# Patient Record
Sex: FEMALE | Race: WHITE | NOT HISPANIC OR LATINO | Employment: PART TIME | URBAN - METROPOLITAN AREA
[De-identification: names, ages, dates, MRNs, and addresses within clinical notes are randomized per-mention and may not be internally consistent; named-entity substitution may affect disease eponyms.]

---

## 2023-02-01 ENCOUNTER — TELEPHONE (OUTPATIENT)
Dept: OBSTETRICS AND GYNECOLOGY | Facility: CLINIC | Age: 68
End: 2023-02-01
Payer: MEDICARE

## 2023-02-03 ENCOUNTER — TELEPHONE (OUTPATIENT)
Dept: OBSTETRICS AND GYNECOLOGY | Facility: CLINIC | Age: 68
End: 2023-02-03
Payer: MEDICARE

## 2023-02-24 ENCOUNTER — TELEPHONE (OUTPATIENT)
Dept: ADMINISTRATIVE | Facility: OTHER | Age: 68
End: 2023-02-24
Payer: MEDICARE

## 2023-02-24 ENCOUNTER — OFFICE VISIT (OUTPATIENT)
Dept: OBSTETRICS AND GYNECOLOGY | Facility: CLINIC | Age: 68
End: 2023-02-24
Payer: MEDICARE

## 2023-02-24 VITALS — BODY MASS INDEX: 26.54 KG/M2 | DIASTOLIC BLOOD PRESSURE: 68 MMHG | WEIGHT: 159.5 LBS | SYSTOLIC BLOOD PRESSURE: 142 MMHG

## 2023-02-24 DIAGNOSIS — Z01.419 WELL WOMAN EXAM WITH ROUTINE GYNECOLOGICAL EXAM: ICD-10-CM

## 2023-02-24 DIAGNOSIS — N95.8 OTHER SPECIFIED MENOPAUSAL AND PERIMENOPAUSAL DISORDERS: ICD-10-CM

## 2023-02-24 DIAGNOSIS — Z12.4 ENCOUNTER FOR SCREENING FOR MALIGNANT NEOPLASM OF CERVIX: ICD-10-CM

## 2023-02-24 DIAGNOSIS — N95.1 MENOPAUSE SYNDROME: Primary | ICD-10-CM

## 2023-02-24 PROCEDURE — 99999 PR PBB SHADOW E&M-EST. PATIENT-LVL III: ICD-10-PCS | Mod: PBBFAC,,, | Performed by: STUDENT IN AN ORGANIZED HEALTH CARE EDUCATION/TRAINING PROGRAM

## 2023-02-24 PROCEDURE — 99213 OFFICE O/P EST LOW 20 MIN: CPT | Mod: PBBFAC,PO,25 | Performed by: STUDENT IN AN ORGANIZED HEALTH CARE EDUCATION/TRAINING PROGRAM

## 2023-02-24 PROCEDURE — 99999 PR PBB SHADOW E&M-EST. PATIENT-LVL III: CPT | Mod: PBBFAC,,, | Performed by: STUDENT IN AN ORGANIZED HEALTH CARE EDUCATION/TRAINING PROGRAM

## 2023-02-24 PROCEDURE — 88175 CYTOPATH C/V AUTO FLUID REDO: CPT | Performed by: STUDENT IN AN ORGANIZED HEALTH CARE EDUCATION/TRAINING PROGRAM

## 2023-02-24 PROCEDURE — 87624 HPV HI-RISK TYP POOLED RSLT: CPT | Performed by: STUDENT IN AN ORGANIZED HEALTH CARE EDUCATION/TRAINING PROGRAM

## 2023-02-24 PROCEDURE — G0101 CA SCREEN;PELVIC/BREAST EXAM: HCPCS | Mod: S$PBB,GZ,, | Performed by: STUDENT IN AN ORGANIZED HEALTH CARE EDUCATION/TRAINING PROGRAM

## 2023-02-24 PROCEDURE — G0101 PR CA SCREEN;PELVIC/BREAST EXAM: ICD-10-PCS | Mod: S$PBB,GZ,, | Performed by: STUDENT IN AN ORGANIZED HEALTH CARE EDUCATION/TRAINING PROGRAM

## 2023-02-24 RX ORDER — PHENYLEPHRINE HCL 10 MG
500 TABLET ORAL
COMMUNITY
End: 2023-10-30

## 2023-02-24 RX ORDER — GLUCOSAMINE HCL 500 MG
150 TABLET ORAL
COMMUNITY

## 2023-02-24 RX ORDER — GABAPENTIN 300 MG/1
600 CAPSULE ORAL NIGHTLY
COMMUNITY
Start: 2023-01-20

## 2023-02-24 RX ORDER — ESTRADIOL 0.1 MG/G
1 CREAM VAGINAL DAILY
Qty: 42.5 G | Refills: 3 | Status: SHIPPED | OUTPATIENT
Start: 2023-02-24

## 2023-02-24 RX ORDER — CHOLECALCIFEROL (VITAMIN D3) 50 MCG
2000 TABLET ORAL
COMMUNITY
End: 2023-10-30

## 2023-02-24 RX ORDER — ASCORBIC ACID 500 MG
500 TABLET ORAL
COMMUNITY
End: 2023-10-30

## 2023-02-24 RX ORDER — MELATONIN 10 MG
10 CAPSULE ORAL
COMMUNITY

## 2023-02-24 RX ORDER — PRENATAL VIT 75/IRON/FOLIC/OM3 28-800-440
COMBINATION PACKAGE (EA) ORAL
COMMUNITY

## 2023-02-24 RX ORDER — NAPROXEN SODIUM 220 MG
220 TABLET ORAL DAILY PRN
COMMUNITY

## 2023-02-24 NOTE — PROGRESS NOTES
CC: Well woman exam    HPI:  Terra Mcpherson is a 67 y.o. female  presents for a well woman exam.  She has no issues, problems, or complaints.      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      Past Surgical History:   Procedure Laterality Date    DILATION AND CURETTAGE OF UTERUS      HYSTERECTOMY       OB History    Para Term  AB Living   2 2 2     2   SAB IAB Ectopic Multiple Live Births           2      # Outcome Date GA Lbr Kehinde/2nd Weight Sex Delivery Anes PTL Lv   2 Term      Vag-Spont   NEETA   1 Term      Vag-Spont   NEETA       GYN  Menopausal: Yes  History of abnormal paps:  yes - reports hyst due to cervical dysplasia  Abnormal or postmenopausal bleeding: DENIES  History of abnormal mammograms:DENIES   Family history of breast or ovarian cancer: DENIES  Any breast masses, pain, skin changes, or nipple discharge: DENIES  Possible recent STD exposure: denies  Contraception: N/A    Pap: No result found, Done today  Mammogram:  scheduled      Family History   Problem Relation Age of Onset    Colon cancer Other      Social History     Tobacco Use    Smoking status: Never    Smokeless tobacco: Never   Substance Use Topics    Alcohol use: Not Currently    Drug use: Not Currently     Types: Other-see comments     Comment: 5CBD     Allergies: Statins-hmg-coa reductase inhibitors, Adhesive, Fenofibrate, Metformin, Omeprazole, Pamabrom, and Sulfa (sulfonamide antibiotics)    Current Outpatient Medications:     ascorbic acid, vitamin C, (VITAMIN C) 500 MG tablet, Take 500 mg by mouth., Disp: , Rfl:     cholecalciferol, vitamin D3, (VITAMIN D3) 50 mcg (2,000 unit) Tab, Take 2,000 Units by mouth., Disp: , Rfl:     cinnamon bark 500 mg capsule, Take 500 mg by mouth., Disp: , Rfl:     estradioL (ESTRACE) 0.01 % (0.1 mg/gram) vaginal cream, Place 1 g vaginally once daily., Disp: 42.5 g, Rfl: 3    gabapentin (NEURONTIN) 300 MG  capsule, Take 300 mg by mouth 3 (three) times daily., Disp: , Rfl:     insulin detemir U-100 (LEVEMIR FLEXTOUCH) 100 unit/mL (3 mL) SubQ InPn pen, Inject 100 mLs into the skin., Disp: , Rfl:     melatonin 10 mg Cap, Take 10 mg by mouth., Disp: , Rfl:     naproxen sodium (ANAPROX) 220 MG tablet, Take 220 mg by mouth., Disp: , Rfl:     resveratroL 250 mg Cap, Take by mouth., Disp: , Rfl:     ubidecarenone (COENZYME Q10) 100 mg Tab, Take 150 mg by mouth., Disp: , Rfl:     ZINC ORAL, Take 10 mg by mouth., Disp: , Rfl:        ROS:  GENERAL: Denies weight gain or weight loss. Feeling well overall.   SKIN: Denies rash or lesions.   HEAD: Denies head injury or headache.   NODES: Denies enlarged lymph nodes.   CHEST: Denies chest pain or shortness of breath.   CARDIOVASCULAR: Denies palpitations or left sided chest pain.   ABDOMEN: No abdominal pain, constipation, diarrhea, nausea, vomiting or rectal bleeding.   URINARY: No frequency, dysuria, hematuria, or burning on urination.  REPRODUCTIVE: See HPI.   BREASTS: The patient performs breast self-examination and denies pain, lumps, or nipple discharge.   HEMATOLOGIC: No easy bruisability or excessive bleeding.  MUSCULOSKELETAL: Denies joint pain or swelling.   NEUROLOGIC: Denies syncope or weakness.   PSYCHIATRIC: Denies depression, anxiety or mood swings.    Objective:   BP (!) 142/68   Wt 72.3 kg (159 lb 8 oz)   BMI 26.54 kg/m²       Physical Exam:  APPEARANCE: Well nourished, well developed, in no acute distress.  AFFECT: WNL, alert and oriented x 3  SKIN: No acne or hirsutism  NECK: Neck symmetric without masses or thyromegaly  NODES: No inguinal, cervical, axillary, or femoral lymph node enlargement  CHEST: Good respiratory effect  ABDOMEN: Soft.  No tenderness or masses.  No hepatosplenomegaly.  No hernias.  BREASTS: Symmetrical, no skin changes or visible lesions.  No palpable masses, nipple discharge bilaterally.  PELVIC: Normal external genitalia without lesions.   Normal hair distribution.  Adequate perineal body, normal urethral meatus.  Vagina atrophic without lesions or discharge.  Cervix surgically absent.  No significant cystocele or rectocele.  Bimanual exam shows uterus surgically absent, nontender.  Adnexa without masses or tenderness.  EXTREMITIES: No edema.    ASSESSMENT AND PLAN  1. Menopause syndrome  estradioL (ESTRACE) 0.01 % (0.1 mg/gram) vaginal cream      2. Encounter for screening for malignant neoplasm of cervix  Ambulatory referral/consult to Obstetrics / Gynecology    Liquid-Based Pap Smear, Screening    HPV High Risk Genotypes, PCR          Annual exam  Breast and pelvic exam: wnl  Patient counseled on ASCCP guidelines for cervical cytology screening  Cervical screening: discussed vaginal screening for 20 years once normal after hyst for dysplasia, likely beyond that time and will be able to discontinue. Patient reports she was still getting paps until she moved here, will complete today.  Patient counseled on current recommendations for breast cancer screening  Mammogram screening: ordered/scheduled already   STD testing: not requested today   Osteoporosis screening ordered  Tobacco cessation counseling n/a    She was counseled to follow up with her PCP for other routine health maintenance      Follow up in about 1 year (around 2/24/2024).      Jesika Solomon MD  OBGYN [facility]

## 2023-03-01 LAB
FINAL PATHOLOGIC DIAGNOSIS: NORMAL
HPV HR 12 DNA SPEC QL NAA+PROBE: NEGATIVE
HPV16 AG SPEC QL: NEGATIVE
HPV18 DNA SPEC QL NAA+PROBE: NEGATIVE
Lab: NORMAL

## 2023-03-02 ENCOUNTER — TELEPHONE (OUTPATIENT)
Dept: OBSTETRICS AND GYNECOLOGY | Facility: CLINIC | Age: 68
End: 2023-03-02
Payer: MEDICARE

## 2023-03-09 ENCOUNTER — OFFICE VISIT (OUTPATIENT)
Dept: URGENT CARE | Facility: CLINIC | Age: 68
End: 2023-03-09
Payer: MEDICARE

## 2023-03-09 VITALS
WEIGHT: 159 LBS | HEART RATE: 66 BPM | OXYGEN SATURATION: 96 % | SYSTOLIC BLOOD PRESSURE: 135 MMHG | RESPIRATION RATE: 16 BRPM | BODY MASS INDEX: 26.49 KG/M2 | HEIGHT: 65 IN | TEMPERATURE: 98 F | DIASTOLIC BLOOD PRESSURE: 69 MMHG

## 2023-03-09 DIAGNOSIS — R73.9 HYPERGLYCEMIA: ICD-10-CM

## 2023-03-09 DIAGNOSIS — Z20.822 ENCOUNTER FOR LABORATORY TESTING FOR COVID-19 VIRUS: ICD-10-CM

## 2023-03-09 DIAGNOSIS — R30.0 DYSURIA: ICD-10-CM

## 2023-03-09 DIAGNOSIS — R81 GLUCOSE FOUND IN URINE ON EXAMINATION: ICD-10-CM

## 2023-03-09 DIAGNOSIS — E86.0 DEHYDRATION: Primary | ICD-10-CM

## 2023-03-09 DIAGNOSIS — N39.0 URINARY TRACT INFECTION WITHOUT HEMATURIA, SITE UNSPECIFIED: ICD-10-CM

## 2023-03-09 LAB
BILIRUB UR QL STRIP: NEGATIVE
CTP QC/QA: YES
GLUCOSE SERPL-MCNC: 253 MG/DL (ref 70–110)
GLUCOSE SERPL-MCNC: 335 MG/DL (ref 70–110)
GLUCOSE UR QL STRIP: POSITIVE
KETONES UR QL STRIP: NEGATIVE
LEUKOCYTE ESTERASE UR QL STRIP: NEGATIVE
PH, POC UA: 5
POC BLOOD, URINE: NEGATIVE
POC NITRATES, URINE: NEGATIVE
PROT UR QL STRIP: NEGATIVE
SARS-COV-2 AG RESP QL IA.RAPID: NEGATIVE
SP GR UR STRIP: 1.01 (ref 1–1.03)
UROBILINOGEN UR STRIP-ACNC: ABNORMAL (ref 0.1–1.1)

## 2023-03-09 PROCEDURE — 87086 URINE CULTURE/COLONY COUNT: CPT

## 2023-03-09 PROCEDURE — 81003 POCT URINALYSIS, DIPSTICK, AUTOMATED, W/O SCOPE: ICD-10-PCS | Mod: QW,S$GLB,,

## 2023-03-09 PROCEDURE — 99204 OFFICE O/P NEW MOD 45 MIN: CPT | Mod: S$GLB,,,

## 2023-03-09 PROCEDURE — 81003 URINALYSIS AUTO W/O SCOPE: CPT | Mod: QW,S$GLB,,

## 2023-03-09 PROCEDURE — 82962 GLUCOSE BLOOD TEST: CPT | Mod: S$GLB,,,

## 2023-03-09 PROCEDURE — 82962 POCT GLUCOSE, HAND-HELD DEVICE: ICD-10-PCS | Mod: S$GLB,,,

## 2023-03-09 PROCEDURE — 87811 SARS CORONAVIRUS 2 ANTIGEN POCT, MANUAL READ: ICD-10-PCS | Mod: QW,CR,S$GLB,

## 2023-03-09 PROCEDURE — 87811 SARS-COV-2 COVID19 W/OPTIC: CPT | Mod: QW,CR,S$GLB,

## 2023-03-09 PROCEDURE — 99204 PR OFFICE/OUTPT VISIT, NEW, LEVL IV, 45-59 MIN: ICD-10-PCS | Mod: S$GLB,,,

## 2023-03-09 RX ORDER — SODIUM CHLORIDE 9 MG/ML
INJECTION, SOLUTION INTRAVENOUS
Status: COMPLETED | OUTPATIENT
Start: 2023-03-09 | End: 2023-03-09

## 2023-03-09 RX ORDER — CYCLOSPORINE 0.5 MG/ML
1 EMULSION OPHTHALMIC 2 TIMES DAILY
COMMUNITY
Start: 2023-02-02

## 2023-03-09 RX ORDER — CEPHALEXIN 500 MG/1
500 CAPSULE ORAL EVERY 12 HOURS
Qty: 14 CAPSULE | Refills: 0 | Status: SHIPPED | OUTPATIENT
Start: 2023-03-09 | End: 2023-03-16

## 2023-03-09 RX ORDER — ONDANSETRON 4 MG/1
4 TABLET, ORALLY DISINTEGRATING ORAL EVERY 8 HOURS PRN
Qty: 9 TABLET | Refills: 0 | Status: SHIPPED | OUTPATIENT
Start: 2023-03-09 | End: 2023-03-12

## 2023-03-09 RX ADMIN — SODIUM CHLORIDE: 9 INJECTION, SOLUTION INTRAVENOUS at 09:03

## 2023-03-09 NOTE — PATIENT INSTRUCTIONS
UTI   If your condition worsens or fails to improve we recommend that you receive another evaluation at the ER immediately or contact your PCP to discuss your concerns or return here. You must understand that you've received an urgent care treatment only and that you may be released before all your medical problems are known or treated. You the patient will arrange for followup care as instructed.   If you were prescribed antibiotics, please take them to full completion.    If you had cultures done it will take 3-5 days to result. We will call you with the result.   If you are are female and on BCP use additional methods to prevent pregnancy while on the antibiotics and for one cycle after.   Cranberry juice may help. Get the 100% cranberry juice and mix 4 oz of juice with 4 oz of water and drink this 8 oz glass of liquid once a day.      Should your symptoms get worse or develop any new symptoms please go to emergency room.  Please hydrate Zofran has been sent to the pharmacy for you to help you with any nausea.  Please monitor your blood glucose.  Please do not drive while dizzy.

## 2023-03-09 NOTE — PROGRESS NOTES
"Subjective:       Patient ID: Terra Mcpherson is a 67 y.o. female.    Vitals:  height is 5' 5" (1.651 m) and weight is 72.1 kg (159 lb). Her tympanic temperature is 97.8 °F (36.6 °C). Her blood pressure is 135/69 and her pulse is 66. Her respiration is 16 and oxygen saturation is 96%.     Chief Complaint: Dysuria    Patient stated her symptoms started 1 day ago.  She stated when she gets dehydrated she gets nauseous and feels like she will faint.  No exposure to covid or flu.     Dysuria   This is a new problem. The current episode started yesterday. The problem has been unchanged. The quality of the pain is described as burning. The pain is at a severity of 5/10. The pain is moderate. There has been no fever. She is Not sexually active. Associated symptoms include nausea and constipation. Pertinent negatives include no discharge, hematuria or vomiting. Associated symptoms comments: Headache, back and abdominal pain. She has tried nothing for the symptoms. The treatment provided no relief. Her past medical history is significant for hypertension. There is no history of kidney stones.     Cardiovascular: Negative.  Negative for chest pain and passing out.   Respiratory: Negative.  Negative for chest tightness and shortness of breath.    Gastrointestinal:  Positive for nausea and constipation. Negative for vomiting.   Genitourinary:  Positive for dysuria. Negative for hematuria.     Objective:      Physical Exam   Constitutional: She is oriented to person, place, and time. She appears well-developed. She is cooperative.  Non-toxic appearance. She does not appear ill. No distress.   HENT:   Head: Normocephalic and atraumatic.   Ears:   Right Ear: Hearing normal.   Left Ear: Hearing normal.   Nose: Nose normal. No mucosal edema, rhinorrhea, nasal deformity or congestion. No epistaxis. Right sinus exhibits no maxillary sinus tenderness and no frontal sinus tenderness. Left sinus exhibits no maxillary sinus tenderness " and no frontal sinus tenderness.   Mouth/Throat: Uvula is midline, oropharynx is clear and moist and mucous membranes are normal. Mucous membranes are moist. No trismus in the jaw. Normal dentition. No uvula swelling. No oropharyngeal exudate, posterior oropharyngeal edema, posterior oropharyngeal erythema, tonsillar abscesses or cobblestoning. Tonsils are 1+ on the right. Tonsils are 1+ on the left. No tonsillar exudate.   Eyes: Conjunctivae and lids are normal. No scleral icterus.   Neck: Trachea normal and phonation normal. Neck supple. No edema present. No erythema present. No neck rigidity present.   Cardiovascular: Normal rate, regular rhythm, S1 normal, S2 normal, normal heart sounds and normal pulses.   Pulmonary/Chest: Effort normal and breath sounds normal. No accessory muscle usage or stridor. No apnea, no tachypnea and no bradypnea. No respiratory distress. She has no decreased breath sounds. She has no wheezes. She has no rhonchi. She has no rales.   Abdominal: Normal appearance and bowel sounds are normal. Soft. There is no abdominal tenderness. There is no rebound, no guarding, no tenderness at McBurney's point, negative Sarah's sign, no left CVA tenderness, negative Rovsing's sign, negative psoas sign, no right CVA tenderness and negative obturator sign.   Musculoskeletal: Normal range of motion.         General: No deformity. Normal range of motion.   Neurological: She is alert and oriented to person, place, and time. She exhibits normal muscle tone. Coordination normal.   Skin: Skin is warm, dry, intact, not diaphoretic and not pale.   Psychiatric: Her speech is normal and behavior is normal. Judgment and thought content normal.   Nursing note and vitals reviewed.      Results for orders placed or performed in visit on 03/09/23   POCT Urinalysis, Dipstick, Automated, W/O Scope   Result Value Ref Range    POC Blood, Urine Negative Negative    POC Bilirubin, Urine Negative Negative    POC  Urobilinogen, Urine norm 0.1 - 1.1    POC Ketones, Urine Negative Negative    POC Protein, Urine Negative Negative    POC Nitrates, Urine Negative Negative    POC Glucose, Urine Positive (A) Negative    pH, UA 5.0     POC Specific Gravity, Urine 1.010 1.003 - 1.029    POC Leukocytes, Urine Negative Negative   SARS Coronavirus 2 Antigen, POCT Manual Read   Result Value Ref Range    SARS Coronavirus 2 Antigen Negative Negative     Acceptable Yes    POCT Glucose, Hand-Held Device   Result Value Ref Range    POC Glucose 335 (A) 70 - 110 MG/DL   POCT Glucose, Hand-Held Device   Result Value Ref Range    POC Glucose 253 (A) 70 - 110 MG/DL       Assessment:       1. Dehydration    2. Dysuria    3. Encounter for laboratory testing for COVID-19 virus    4. Glucose found in urine on examination    5. Urinary tract infection without hematuria, site unspecified    6. Hyperglycemia          Plan:         Dehydration  -     Orthostatic vital signs    Dysuria  -     POCT Urinalysis, Dipstick, Automated, W/O Scope  -     CULTURE, URINE    Encounter for laboratory testing for COVID-19 virus  -     SARS Coronavirus 2 Antigen, POCT Manual Read    Glucose found in urine on examination  -     POCT Glucose, Hand-Held Device  -     POCT Glucose, Hand-Held Device    Urinary tract infection without hematuria, site unspecified  -     cephALEXin (KEFLEX) 500 MG capsule; Take 1 capsule (500 mg total) by mouth every 12 (twelve) hours. for 7 days  Dispense: 14 capsule; Refill: 0    Hyperglycemia    Other orders  -     0.9%  NaCl infusion  -     ondansetron (ZOFRAN-ODT) 4 MG TbDL; Take 1 tablet (4 mg total) by mouth every 8 (eight) hours as needed (nausea).  Dispense: 9 tablet; Refill: 0  -     0.9%  NaCl infusion           Medical Decision Making:   Initial Assessment:   PT in room AAOX4, skin W/D, resp E/U, non toxic in appearance, NAD.  All lung fields clear to auscultation No Trismus, or regine's, PT tolerating secretions, able  to visualize uvula.   No drooling PT speaking in clear sentences. Non surgical ABD no CVA tenderness  Urgent Care Management:  Discussed with patient patient's history urinary tract infections patient reports this feels like previous UTIs in past.  Discussed with patient I will send patient home with Keflex to treat urinary tract infection, discussed urine results with patient that there was no leukocytes or nitrites found but since patient has a history of UTIs since feels like similar to previous episodes and  with our UA machine there can be a miss, that I will treat patient with Keflex for urinary tract infection.  Discussed with patient that I will also give patient 500 mL of normal saline to help with patient's hyperglycemia.  Patient reports sugar at home was 103 but between home and coming to clinic patient ate a meal that was high in sugar.  Discussed with patient that I will send patient home with Zofran and to orally hydrate after patient received 750 mL normal saline and to monitor blood sugar and should sugar get too high go to emergency room.  Patient's glucose improved 253 after 750 mL.  Discussed with patient that I believe patient's dizziness is due to dehydration as patient has been dehydrated in past.  I do not have a recent creatinine or BUN to go off of so I will only give 500 mL normal saline to treat sugar as well as dehydration.  Discussed with patient that oral hydration is key to getting patient to improve with dizziness.  Discussed with patient should dizziness get any worse that patient should go to emergency room.  Patient has reported had a very similar episode in the past patient was given fluids and felt much better.  Patient reports taking in very little water throughout the day and drinking coffee and patient has a history of frequent dehydration.  Strict ED precautions given to patient.  Discussed with patient follow-up primary care provider if this does not improve in the next 48  hours.  Discussed with patient if patient stills feels dizzy to please do not drive thoroughly discussed this with patient, discussed different options such as calling cab or over with patient.  Patient reports feeling a little bit better after fluid and reports is able to drive, patient is awake alert oriented in walking with steady gait in clinic, again reviewed with patient if patient is dizzy to not drive this was thoroughly discussed throughout visit.  Patient agrees with treatment plan and verbalizes understanding patient ambulatory from clinic in no acute distress

## 2023-03-10 LAB — BACTERIA UR CULT: NORMAL

## 2023-03-11 ENCOUNTER — TELEPHONE (OUTPATIENT)
Dept: URGENT CARE | Facility: CLINIC | Age: 68
End: 2023-03-11
Payer: MEDICARE

## 2023-03-14 ENCOUNTER — TELEPHONE (OUTPATIENT)
Dept: ADMINISTRATIVE | Facility: OTHER | Age: 68
End: 2023-03-14
Payer: MEDICARE

## 2023-03-17 ENCOUNTER — LAB VISIT (OUTPATIENT)
Dept: LAB | Facility: HOSPITAL | Age: 68
End: 2023-03-17
Attending: STUDENT IN AN ORGANIZED HEALTH CARE EDUCATION/TRAINING PROGRAM
Payer: MEDICARE

## 2023-03-17 ENCOUNTER — TELEPHONE (OUTPATIENT)
Dept: ENDOCRINOLOGY | Facility: CLINIC | Age: 68
End: 2023-03-17

## 2023-03-17 ENCOUNTER — OFFICE VISIT (OUTPATIENT)
Dept: ENDOCRINOLOGY | Facility: CLINIC | Age: 68
End: 2023-03-17
Payer: MEDICARE

## 2023-03-17 VITALS
HEART RATE: 68 BPM | RESPIRATION RATE: 16 BRPM | DIASTOLIC BLOOD PRESSURE: 78 MMHG | WEIGHT: 158.75 LBS | HEIGHT: 65 IN | SYSTOLIC BLOOD PRESSURE: 119 MMHG | BODY MASS INDEX: 26.45 KG/M2

## 2023-03-17 DIAGNOSIS — I87.2 VENOUS INSUFFICIENCY: ICD-10-CM

## 2023-03-17 DIAGNOSIS — E11.42 POLYNEUROPATHY DUE TO TYPE 2 DIABETES MELLITUS: ICD-10-CM

## 2023-03-17 DIAGNOSIS — E78.2 MIXED HYPERLIPIDEMIA: ICD-10-CM

## 2023-03-17 DIAGNOSIS — Z79.4 TYPE 2 DIABETES MELLITUS WITH HYPERGLYCEMIA, WITH LONG-TERM CURRENT USE OF INSULIN: Primary | ICD-10-CM

## 2023-03-17 DIAGNOSIS — E11.65 TYPE 2 DIABETES MELLITUS WITH HYPERGLYCEMIA, WITH LONG-TERM CURRENT USE OF INSULIN: Primary | ICD-10-CM

## 2023-03-17 LAB
ALBUMIN SERPL BCP-MCNC: 4.1 G/DL (ref 3.5–5.2)
ALBUMIN/CREAT UR: 5.8 UG/MG (ref 0–30)
ALP SERPL-CCNC: 88 U/L (ref 55–135)
ALT SERPL W/O P-5'-P-CCNC: 22 U/L (ref 10–44)
ANION GAP SERPL CALC-SCNC: 10 MMOL/L (ref 8–16)
AST SERPL-CCNC: 18 U/L (ref 10–40)
BILIRUB SERPL-MCNC: 0.5 MG/DL (ref 0.1–1)
BUN SERPL-MCNC: 16 MG/DL (ref 8–23)
C PEPTIDE SERPL-MCNC: 1.79 NG/ML (ref 0.78–5.19)
CALCIUM SERPL-MCNC: 9.8 MG/DL (ref 8.7–10.5)
CHLORIDE SERPL-SCNC: 102 MMOL/L (ref 95–110)
CHOLEST SERPL-MCNC: 311 MG/DL (ref 120–199)
CHOLEST/HDLC SERPL: 8.2 {RATIO} (ref 2–5)
CO2 SERPL-SCNC: 26 MMOL/L (ref 23–29)
CREAT SERPL-MCNC: 0.8 MG/DL (ref 0.5–1.4)
CREAT UR-MCNC: 51.3 MG/DL (ref 15–325)
EST. GFR  (NO RACE VARIABLE): >60 ML/MIN/1.73 M^2
ESTIMATED AVG GLUCOSE: 217 MG/DL (ref 68–131)
GLUCOSE SERPL-MCNC: 185 MG/DL (ref 70–110)
HBA1C MFR BLD: 9.2 % (ref 4–5.6)
HDLC SERPL-MCNC: 38 MG/DL (ref 40–75)
HDLC SERPL: 12.2 % (ref 20–50)
LDLC SERPL CALC-MCNC: 197.8 MG/DL (ref 63–159)
LIPASE SERPL-CCNC: 43 U/L (ref 4–60)
MICROALBUMIN UR DL<=1MG/L-MCNC: 3 UG/ML
NONHDLC SERPL-MCNC: 273 MG/DL
POTASSIUM SERPL-SCNC: 4.3 MMOL/L (ref 3.5–5.1)
PROT SERPL-MCNC: 7.3 G/DL (ref 6–8.4)
SODIUM SERPL-SCNC: 138 MMOL/L (ref 136–145)
TRIGL SERPL-MCNC: 376 MG/DL (ref 30–150)
TSH SERPL DL<=0.005 MIU/L-ACNC: 1.69 UIU/ML (ref 0.4–4)

## 2023-03-17 PROCEDURE — 82570 ASSAY OF URINE CREATININE: CPT | Performed by: STUDENT IN AN ORGANIZED HEALTH CARE EDUCATION/TRAINING PROGRAM

## 2023-03-17 PROCEDURE — 84681 ASSAY OF C-PEPTIDE: CPT | Performed by: STUDENT IN AN ORGANIZED HEALTH CARE EDUCATION/TRAINING PROGRAM

## 2023-03-17 PROCEDURE — 36415 COLL VENOUS BLD VENIPUNCTURE: CPT | Performed by: STUDENT IN AN ORGANIZED HEALTH CARE EDUCATION/TRAINING PROGRAM

## 2023-03-17 PROCEDURE — 80053 COMPREHEN METABOLIC PANEL: CPT | Performed by: STUDENT IN AN ORGANIZED HEALTH CARE EDUCATION/TRAINING PROGRAM

## 2023-03-17 PROCEDURE — 99999 PR STA SHADOW: CPT | Mod: PBBFAC,,, | Performed by: STUDENT IN AN ORGANIZED HEALTH CARE EDUCATION/TRAINING PROGRAM

## 2023-03-17 PROCEDURE — 99999 PR PBB SHADOW E&M-EST. PATIENT-LVL IV: CPT | Mod: PBBFAC,,, | Performed by: STUDENT IN AN ORGANIZED HEALTH CARE EDUCATION/TRAINING PROGRAM

## 2023-03-17 PROCEDURE — 99204 OFFICE O/P NEW MOD 45 MIN: CPT | Mod: S$PBB | Performed by: STUDENT IN AN ORGANIZED HEALTH CARE EDUCATION/TRAINING PROGRAM

## 2023-03-17 PROCEDURE — 84443 ASSAY THYROID STIM HORMONE: CPT | Performed by: STUDENT IN AN ORGANIZED HEALTH CARE EDUCATION/TRAINING PROGRAM

## 2023-03-17 PROCEDURE — 99214 OFFICE O/P EST MOD 30 MIN: CPT | Mod: PBBFAC | Performed by: STUDENT IN AN ORGANIZED HEALTH CARE EDUCATION/TRAINING PROGRAM

## 2023-03-17 PROCEDURE — 80061 LIPID PANEL: CPT | Performed by: STUDENT IN AN ORGANIZED HEALTH CARE EDUCATION/TRAINING PROGRAM

## 2023-03-17 PROCEDURE — 83036 HEMOGLOBIN GLYCOSYLATED A1C: CPT | Performed by: STUDENT IN AN ORGANIZED HEALTH CARE EDUCATION/TRAINING PROGRAM

## 2023-03-17 PROCEDURE — 99999 PR STA SHADOW: ICD-10-PCS | Mod: PBBFAC,,, | Performed by: STUDENT IN AN ORGANIZED HEALTH CARE EDUCATION/TRAINING PROGRAM

## 2023-03-17 PROCEDURE — 83690 ASSAY OF LIPASE: CPT | Performed by: STUDENT IN AN ORGANIZED HEALTH CARE EDUCATION/TRAINING PROGRAM

## 2023-03-17 NOTE — ASSESSMENT & PLAN NOTE
Ok to continue gabapentin which she has mostly used for vasomotor symptoms of menopause  Recommended OTC  mg daily

## 2023-03-17 NOTE — TELEPHONE ENCOUNTER
Augustin order form faxed  to [facility] on 3/17/2022. Confirmation received.        ----- Message from Chris Moss MD sent at 3/17/2023  1:57 PM CDT -----  Augustin orders to [facility]    Thanks

## 2023-03-17 NOTE — ASSESSMENT & PLAN NOTE
Uncontrolled based on A1c and reported POCT glucose with significant glycemic variability.    Patient is on an intensive insulin regimen with 4 daily injections and is testing glucose 4+ times daily. Patient has demonstrated an understanding of technology and is motivated to use the device correctly. Patient is expected to adhere to a diabetes treatment plan and is capable of recognizing alerts and alarms. Patient has recurrent, severe (BG <54) hypoglycemia and impaired awareness of hypoglycemia.    Patient's insulin regimen requires frequent adjustments based on BG results. Patient will receive an in-person visit every 6 months to assess adherence to CGM regimen and diabetes treatment.    I am unsure of control. She has most fasting POCT glucose at goal but glucose on chemistry was 273. Suspect prandial rises. Will check A1c.    Pending A1c, consider adding GLP-1 RA. Her history of pancreatitis is not clear to me so I think we can use. Will check lipase, C-peptide.    Plan  - Check labs  - Continue Levemir 35 U daily  - Start FreeStyle Augustin 2 CGM  - ALA for neuropathy

## 2023-03-17 NOTE — PROGRESS NOTES
Subjective:      Patient ID: Terra Mcpherson is a 67 y.o. female.    Chief Complaint:  Type 2 diabetes mellitus    History of Present Illness  This is a 67 y.o. female. with a past medical history of Type 2 diabetes mellitus here for evaluation.    Type 2 diabetes mellitus    Current diabetes medications:  - Levemir 35 U daily  - Novolog SSI 4 times a day    INSULIN CORRECTION SCALE    Glucose                 Insulin           181-200               +2 units                     201-250               +4 units                      251-300               +6 units                     301-350               +8 units                      >350                    +10 units                       Past diabetes medications:  - Metformin - intolerance  - Farxiga - intolerance  - Trulicity - reports history of pancreatitis - but upon clarifying she has never been really diagnosed with lipase or CT      Lab Results   Component Value Date    CREATININE 0.8 03/17/2023    EGFRNORACEVR >60 03/17/2023       Known diabetic complications: peripheral neuropathy    Weight trend:  Wt Readings from Last 8 Encounters:   03/17/23 72 kg (158 lb 11.7 oz)   03/13/23 71.8 kg (158 lb 4.6 oz)   03/09/23 72.1 kg (159 lb)   02/24/23 72.3 kg (159 lb 8 oz)   10/09/22 74.9 kg (165 lb 2 oz)       Family history of diabetes:  Yes, mother and everyone in her side of the family  Siblings +  Children have been checked, negative for diabetes    Prior visit with diabetes education: yes    Current diet: 3 meals per day  Current exercise: On her feet all day    Blood glucose monitoring at home: 4x/day  Home blood sugar records:   Any episodes of hypoglycemia? Yes    Diabetes Management Status  Statin: Not taking  ACE/ARB: Not taking    Screening or Prevention Patient's value   HgA1C Testing and Control   No results found for: HGBA1C     LDL control Lab Results   Component Value Date    LDLCALC 197.8 (H) 03/17/2023      Nephropathy screening Lab Results  "  Component Value Date    MICALBCREAT 5.8 03/17/2023        Last eye exam: Most Recent Eye Exam Date: Not Found      Review of Systems  As above    Social and family history reviewed  Current medications and allergies reviewed    Objective:   /78 (BP Location: Left arm, Patient Position: Sitting, BP Method: Medium (Manual))   Pulse 68   Resp 16   Ht 5' 5" (1.651 m)   Wt 72 kg (158 lb 11.7 oz)   BMI 26.41 kg/m²   Physical Exam  Alert, oriented    BP Readings from Last 1 Encounters:   03/17/23 119/78      Wt Readings from Last 1 Encounters:   03/17/23 1053 72 kg (158 lb 11.7 oz)     Body mass index is 26.41 kg/m².    Lab Review:   No results found for: HGBA1C  Lab Results   Component Value Date    CHOL 311 (H) 03/17/2023    HDL 38 (L) 03/17/2023    LDLCALC 197.8 (H) 03/17/2023    TRIG 376 (H) 03/17/2023    CHOLHDL 12.2 (L) 03/17/2023     Lab Results   Component Value Date     03/17/2023    K 4.3 03/17/2023     03/17/2023    CO2 26 03/17/2023     (H) 03/17/2023    BUN 16 03/17/2023    CREATININE 0.8 03/17/2023    CALCIUM 9.8 03/17/2023    PROT 7.3 03/17/2023    ALBUMIN 4.1 03/17/2023    BILITOT 0.5 03/17/2023    ALKPHOS 88 03/17/2023    AST 18 03/17/2023    ALT 22 03/17/2023    ANIONGAP 10 03/17/2023    TSH 1.685 03/17/2023       All pertinent labs reviewed    Assessment and Plan     Type 2 diabetes mellitus with hyperglycemia, with long-term current use of insulin  Uncontrolled based on A1c and reported POCT glucose with significant glycemic variability.    Patient is on an intensive insulin regimen with 4 daily injections and is testing glucose 4+ times daily. Patient has demonstrated an understanding of technology and is motivated to use the device correctly. Patient is expected to adhere to a diabetes treatment plan and is capable of recognizing alerts and alarms. Patient has recurrent, severe (BG <54) hypoglycemia and impaired awareness of hypoglycemia.    Patient's insulin regimen " requires frequent adjustments based on BG results. Patient will receive an in-person visit every 6 months to assess adherence to CGM regimen and diabetes treatment.    I am unsure of control. She has most fasting POCT glucose at goal but glucose on chemistry was 273. Suspect prandial rises. Will check A1c.    Pending A1c, consider adding GLP-1 RA. Her history of pancreatitis is not clear to me so I think we can use. Will check lipase, C-peptide.    Plan  - Check labs  - Continue Levemir 35 U daily  - Start FreeStyle Augustin 2 CGM  - ALA for neuropathy    Polyneuropathy due to type 2 diabetes mellitus  Ok to continue gabapentin which she has mostly used for vasomotor symptoms of menopause  Recommended OTC  mg daily    Venous insufficiency  She has tried leg elevation, compression stocking and they have failed  Vascular Surgery referral    Mixed hyperlipidemia  Allergy to statins  Will check lipids - consider Zetia      Follow-up in 6 months    Chris Moss MD  Endocrinology

## 2023-03-20 DIAGNOSIS — E78.2 MIXED HYPERLIPIDEMIA: Primary | ICD-10-CM

## 2023-03-20 DIAGNOSIS — Z78.9 STATIN INTOLERANCE: ICD-10-CM

## 2023-10-02 ENCOUNTER — OFFICE VISIT (OUTPATIENT)
Dept: ENDOCRINOLOGY | Facility: CLINIC | Age: 68
End: 2023-10-02
Payer: MEDICARE

## 2023-10-02 VITALS
SYSTOLIC BLOOD PRESSURE: 134 MMHG | WEIGHT: 158 LBS | HEIGHT: 65 IN | DIASTOLIC BLOOD PRESSURE: 74 MMHG | HEART RATE: 76 BPM | BODY MASS INDEX: 26.33 KG/M2

## 2023-10-02 DIAGNOSIS — R10.13 EPIGASTRIC PAIN: ICD-10-CM

## 2023-10-02 DIAGNOSIS — E11.65 TYPE 2 DIABETES MELLITUS WITH HYPERGLYCEMIA, WITH LONG-TERM CURRENT USE OF INSULIN: Primary | ICD-10-CM

## 2023-10-02 DIAGNOSIS — Z79.4 TYPE 2 DIABETES MELLITUS WITH HYPERGLYCEMIA, WITH LONG-TERM CURRENT USE OF INSULIN: Primary | ICD-10-CM

## 2023-10-02 DIAGNOSIS — E78.2 MIXED HYPERLIPIDEMIA: ICD-10-CM

## 2023-10-02 DIAGNOSIS — E11.42 POLYNEUROPATHY DUE TO TYPE 2 DIABETES MELLITUS: ICD-10-CM

## 2023-10-02 DIAGNOSIS — F41.9 ANXIETY: ICD-10-CM

## 2023-10-02 DIAGNOSIS — I87.2 VENOUS INSUFFICIENCY: ICD-10-CM

## 2023-10-02 PROCEDURE — 99214 OFFICE O/P EST MOD 30 MIN: CPT | Mod: PBBFAC | Performed by: STUDENT IN AN ORGANIZED HEALTH CARE EDUCATION/TRAINING PROGRAM

## 2023-10-02 PROCEDURE — 99999 PR STA SHADOW: CPT | Mod: PBBFAC,,, | Performed by: STUDENT IN AN ORGANIZED HEALTH CARE EDUCATION/TRAINING PROGRAM

## 2023-10-02 PROCEDURE — 99214 OFFICE O/P EST MOD 30 MIN: CPT | Mod: S$PBB | Performed by: STUDENT IN AN ORGANIZED HEALTH CARE EDUCATION/TRAINING PROGRAM

## 2023-10-02 PROCEDURE — 99999 PR PBB SHADOW E&M-EST. PATIENT-LVL IV: ICD-10-PCS | Mod: PBBFAC,,, | Performed by: STUDENT IN AN ORGANIZED HEALTH CARE EDUCATION/TRAINING PROGRAM

## 2023-10-02 PROCEDURE — 99999 PR PBB SHADOW E&M-EST. PATIENT-LVL IV: CPT | Mod: PBBFAC,,, | Performed by: STUDENT IN AN ORGANIZED HEALTH CARE EDUCATION/TRAINING PROGRAM

## 2023-10-02 NOTE — ASSESSMENT & PLAN NOTE
Reports postprandial epigastric pain  Will refer to GI   Another clinical question for GI will be to determine if she can take a GLP-1 RA, she has a reported history of pancreatitis but is not clear to me

## 2023-10-02 NOTE — ASSESSMENT & PLAN NOTE
Reports allergy to statins  LDL is above goal  She was referred to Cardiology but appt was cancelled

## 2023-10-02 NOTE — ASSESSMENT & PLAN NOTE
Uncontrolled based on A1c and reported POCT glucose with significant glycemic variability.    Patient is on an intensive insulin regimen with 4 daily injections and is testing glucose 4+ times daily. Patient has demonstrated an understanding of technology and is motivated to use the device correctly. Patient is expected to adhere to a diabetes treatment plan and is capable of recognizing alerts and alarms. Patient has recurrent, severe (BG <54) hypoglycemia and impaired awareness of hypoglycemia.    Patient's insulin regimen requires frequent adjustments based on BG results. Patient will receive an in-person visit every 6 months to assess adherence to CGM regimen and diabetes treatment.    She reports GI intolerance to SGLT-2 inhibitor and metformin but reports 'everything she eats' make her stomach sick. Discussed seeing GI to evaluate for underlying causes (?PUD, gastritis, GERD, etc).    Maybe if this gets better can consider non-insulin agents.    Will look into options for CGM assistance.    Plan  - Continue Levemir 35 U daily  - Continue Novolog SSI 4 times daily  - Start FreeStyle Augustin 3 CGM  - GI consult

## 2023-10-02 NOTE — PROGRESS NOTES
Subjective:      Patient ID: Terra Mcpherson is a 68 y.o. female.    Chief Complaint:  Type 2 diabetes mellitus    History of Present Illness  This is a 68 y.o. female. with a past medical history of Type 2 diabetes mellitus here for follow up    Type 2 diabetes mellitus    Current diabetes medications:  - Levemir 35 U daily  - Novolog SSI 4 times a day    INSULIN CORRECTION SCALE    Glucose                 Insulin           181-200               +2 units                     201-250               +4 units                      251-300               +6 units                     301-350               +8 units                      >350                    +10 units                       Past diabetes medications:  - Metformin - intolerance  - Farxiga - intolerance  - Trulicity - reports history of pancreatitis - but upon clarifying she has never been really diagnosed with lipase or CT      Lab Results   Component Value Date    CREATININE 0.64 06/02/2023    EGFRNORACEVR >60.0 06/02/2023       Known diabetic complications: peripheral neuropathy    Weight trend:  Wt Readings from Last 8 Encounters:   10/02/23 71.7 kg (158 lb)   06/02/23 70.3 kg (155 lb)   03/17/23 72 kg (158 lb 11.7 oz)   03/13/23 71.8 kg (158 lb 4.6 oz)   03/09/23 72.1 kg (159 lb)   02/24/23 72.3 kg (159 lb 8 oz)   10/09/22 74.9 kg (165 lb 2 oz)       Family history of diabetes:  Yes, mother and everyone in her side of the family  Siblings +  Children have been checked, negative for diabetes    Prior visit with diabetes education: yes    Current diet: 3 meals per day  Current exercise: On her feet all day    Blood glucose monitoring at home: 4x/day  Home blood sugar records:   Any episodes of hypoglycemia? Yes    Diabetes Management Status  Statin: Not taking  ACE/ARB: Not taking    Screening or Prevention Patient's value   HgA1C Testing and Control   Lab Results   Component Value Date    HGBA1C 9.2 (H) 03/17/2023        LDL control Lab Results  "  Component Value Date    LDLCALC 197.8 (H) 03/17/2023      Nephropathy screening Lab Results   Component Value Date    MICALBCREAT 5.8 03/17/2023        Last eye exam: Most Recent Eye Exam Date: Not Found      Review of Systems  As above    Social and family history reviewed  Current medications and allergies reviewed    Objective:   /74 (BP Location: Right arm, Patient Position: Sitting, BP Method: Medium (Manual))   Pulse 76   Ht 5' 5" (1.651 m)   Wt 71.7 kg (158 lb)   BMI 26.29 kg/m²   Physical Exam  Alert, oriented    BP Readings from Last 1 Encounters:   10/02/23 134/74      Wt Readings from Last 1 Encounters:   10/02/23 1449 71.7 kg (158 lb)     Body mass index is 26.29 kg/m².    Lab Review:   Lab Results   Component Value Date    HGBA1C 9.2 (H) 03/17/2023     Lab Results   Component Value Date    CHOL 311 (H) 03/17/2023    HDL 38 (L) 03/17/2023    LDLCALC 197.8 (H) 03/17/2023    TRIG 376 (H) 03/17/2023    CHOLHDL 12.2 (L) 03/17/2023     Lab Results   Component Value Date     06/02/2023    K 4.2 06/02/2023     06/02/2023    CO2 24 06/02/2023     (H) 06/02/2023    BUN 14 06/02/2023    CREATININE 0.64 06/02/2023    CALCIUM 9.6 06/02/2023    PROT 7.9 06/02/2023    ALBUMIN 4.7 06/02/2023    BILITOT 0.9 06/02/2023    ALKPHOS 90 06/02/2023    AST 28 06/02/2023    ALT 27 06/02/2023    ANIONGAP 10 06/02/2023    TSH 2.560 06/02/2023       All pertinent labs reviewed    Assessment and Plan     Type 2 diabetes mellitus with hyperglycemia, with long-term current use of insulin  Uncontrolled based on A1c and reported POCT glucose with significant glycemic variability.    Patient is on an intensive insulin regimen with 4 daily injections and is testing glucose 4+ times daily. Patient has demonstrated an understanding of technology and is motivated to use the device correctly. Patient is expected to adhere to a diabetes treatment plan and is capable of recognizing alerts and alarms. Patient has " recurrent, severe (BG <54) hypoglycemia and impaired awareness of hypoglycemia.    Patient's insulin regimen requires frequent adjustments based on BG results. Patient will receive an in-person visit every 6 months to assess adherence to CGM regimen and diabetes treatment.    She reports GI intolerance to SGLT-2 inhibitor and metformin but reports 'everything she eats' make her stomach sick. Discussed seeing GI to evaluate for underlying causes (?PUD, gastritis, GERD, etc).    Maybe if this gets better can consider non-insulin agents.    Will look into options for CGM assistance.    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

## 2023-10-03 ENCOUNTER — TELEPHONE (OUTPATIENT)
Dept: PSYCHIATRY | Facility: CLINIC | Age: 68
End: 2023-10-03
Payer: MEDICARE

## 2023-10-03 ENCOUNTER — TELEPHONE (OUTPATIENT)
Dept: GASTROENTEROLOGY | Facility: CLINIC | Age: 68
End: 2023-10-03
Payer: MEDICARE

## 2023-10-03 NOTE — TELEPHONE ENCOUNTER
Attempted to contact patient to scheduled office visit, left VM for patient to call our office.    Appt. scheduled 10/30/2023.      ----- Message from Chris Moss MD sent at 10/3/2023  6:44 AM CDT -----  Hello,    I sent a referral for chronic epigastric abdominal pain, she is in the [place] area so prefers someone close to her    Thank you

## 2023-10-03 NOTE — TELEPHONE ENCOUNTER
----- Message from Chris Moss MD sent at 10/2/2023  4:05 PM CDT -----  Hi,     I sent a consult for symptoms of anxiety depression. Patient was agreeable with consult.

## 2023-10-03 NOTE — TELEPHONE ENCOUNTER
Spoke to patient and she is not looking for medication management. She states she would like to see a counselor first before being put on medication. Explained to her that we do not do counseling here. Sent her a list of resources for a therapist and told her to call us back if she would like to schedule with a Psychiatrist for medication management.

## 2023-10-23 ENCOUNTER — TELEPHONE (OUTPATIENT)
Dept: ENDOCRINOLOGY | Facility: CLINIC | Age: 68
End: 2023-10-23
Payer: MEDICARE

## 2023-10-23 NOTE — TELEPHONE ENCOUNTER
Pt said she would like to get the marleen, advised pt to call dms.      ----- Message from Sabrina Flannery MA sent at 10/23/2023 10:15 AM CDT -----  Contact: Self  Terra Mcpherson  MRN: [id number]  : 1955  PCP: Pola Machado  Home Phone      [phone]  Work Phone      Not on file.  Mobile          [phone]      MESSAGE:   Would like to get a rx for a continuous glucose monitor device- Pam. Phone :  [phone]

## 2023-10-30 ENCOUNTER — OFFICE VISIT (OUTPATIENT)
Dept: GASTROENTEROLOGY | Facility: CLINIC | Age: 68
End: 2023-10-30
Payer: MEDICARE

## 2023-10-30 VITALS
HEART RATE: 62 BPM | HEIGHT: 65 IN | WEIGHT: 159.19 LBS | OXYGEN SATURATION: 98 % | BODY MASS INDEX: 26.52 KG/M2 | SYSTOLIC BLOOD PRESSURE: 136 MMHG | DIASTOLIC BLOOD PRESSURE: 82 MMHG

## 2023-10-30 DIAGNOSIS — R11.2 NAUSEA AND VOMITING, UNSPECIFIED VOMITING TYPE: ICD-10-CM

## 2023-10-30 DIAGNOSIS — K21.9 GASTROESOPHAGEAL REFLUX DISEASE, UNSPECIFIED WHETHER ESOPHAGITIS PRESENT: ICD-10-CM

## 2023-10-30 DIAGNOSIS — R10.13 EPIGASTRIC PAIN: Primary | ICD-10-CM

## 2023-10-30 PROBLEM — T46.6X5A ADVERSE EFFECT OF HMG-COA REDUCTASE INHIBITOR: Status: ACTIVE | Noted: 2023-01-19

## 2023-10-30 PROCEDURE — 99214 OFFICE O/P EST MOD 30 MIN: CPT | Mod: PBBFAC,PO | Performed by: NURSE PRACTITIONER

## 2023-10-30 PROCEDURE — 99204 OFFICE O/P NEW MOD 45 MIN: CPT | Mod: S$PBB,,, | Performed by: NURSE PRACTITIONER

## 2023-10-30 PROCEDURE — 99204 PR OFFICE/OUTPT VISIT, NEW, LEVL IV, 45-59 MIN: ICD-10-PCS | Mod: S$PBB,,, | Performed by: NURSE PRACTITIONER

## 2023-10-30 PROCEDURE — 99999 PR PBB SHADOW E&M-EST. PATIENT-LVL IV: ICD-10-PCS | Mod: PBBFAC,,, | Performed by: NURSE PRACTITIONER

## 2023-10-30 PROCEDURE — 99999 PR PBB SHADOW E&M-EST. PATIENT-LVL IV: CPT | Mod: PBBFAC,,, | Performed by: NURSE PRACTITIONER

## 2023-10-30 RX ORDER — FAMOTIDINE 20 MG/1
20 TABLET, FILM COATED ORAL 2 TIMES DAILY
Qty: 60 TABLET | Refills: 2 | Status: SHIPPED | OUTPATIENT
Start: 2023-10-30 | End: 2024-10-29

## 2023-10-30 NOTE — PATIENT INSTRUCTIONS
EGD Prep Instructions    [facility] [address] [place]  [address]    You are scheduled for an EGD with Dr. Zeng on 11/22/2023 at [facility].  You will enter through the [facility] entrance and check in at Same Day Surgery.    Start a CLEAR LIQUID DIET ONE DAY BEFORE YOUR PROCEDURE.   CLEAR LIQUID DIET:   - Avoid Red, Orange, Purple, and/or Blue food coloring   - NO DAIRY   - You can have:  Coffee with sugar (no creamer), tea, water, soda, apple or white grape juice, chicken or beef broth/bouillon (no meat, noodles, or veggies), green/yellow popsicles, green/yellow Jell-O, lemonade.     Nothing to eat or drink after midnight before the procedure.  You MAY brush your teeth.    You MAY take your blood pressure, heart, and seizure medication on the morning of the procedure, with a SIP of water.  Hold ALL other medications until after the procedure.    You must have someone with you to DRIVE YOU HOME since you will be receiving IV sedation for the procedure.    If you are on blood thinners THAT YOU HAVE BEEN INSTRUCTED TO HOLD BY YOUR DOCTOR FOR THIS PROCEDURE, then do NOT take this the morning of your EGD.  Do NOT stop these medications on your own, they must be approved to be held by your doctor.  Your EGD can NOT be done if you are on these medications.  Examples of blood thinners include: Coumadin, Aggrenox, Plavix, Pradaxa, Reapro, Pletal, Xarelto, Ticagrelor, Brilinta, Eliquis, and high dose aspirin (325 mg).  You do not have to stop baby aspirin 81 mg.    You will receive a call the afternoon before your EGD to tell you the time to arrive.  If you have not received a call by the day before your procedure, call the Pre-op Coordinator at [phone].

## 2023-10-30 NOTE — PROGRESS NOTES
Subjective:       Patient ID: Terra Mcpherson is a 68 y.o. female.    Chief Complaint: Abdominal Pain and Nausea    69 y/o female with pmhx of T2DM and colon polyps referred by endocrinology for abdominal pain, nausea and vomiting. Patient reports post-prandial epigastric pain and nausea after most meals. Rarely vomits. Symptoms much worse after eating meal high in fats and/or carbs. Has occasional heartburn. Stopped previously prescribed omeprazole to due adverse reaction. Had a EGD years ago at non[facility] facility that revealed hiatal hernia. Denies constipation or diarrhea. Has at least 1 BM daily. Last colonoscopy at [facility] in 7/2019 was normal; no polyps removed.        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        Past Surgical History:   Procedure Laterality Date    DILATION AND CURETTAGE OF UTERUS      HYSTERECTOMY         Family History   Problem Relation Age of Onset    No Known Problems Mother     No Known Problems Father     Colon cancer Other        Social History     Socioeconomic History    Marital status:    Tobacco Use    Smoking status: Never    Smokeless tobacco: Never   Substance and Sexual Activity    Alcohol use: Not Currently    Drug use: Not Currently     Types: Other-see comments     Comment: 5CBD    Sexual activity: Not Currently       Review of Systems   Constitutional:  Negative for appetite change and unexpected weight change.   HENT:  Negative for trouble swallowing.    Respiratory:  Negative for shortness of breath.    Cardiovascular:  Negative for chest pain.   Gastrointestinal:  Positive for abdominal pain and nausea. Negative for constipation and diarrhea.   Psychiatric/Behavioral:  Negative for dysphoric mood.          Objective:     Vitals:    10/30/23 0937   BP: 136/82   BP Location: Right arm   Patient Position: Sitting   BP Method: Medium (Manual)   Pulse: 62   SpO2: 98%  "  Weight: 72.2 kg (159 lb 2.8 oz)   Height: 5' 5" (1.651 m)          Physical Exam  Constitutional:       General: She is not in acute distress.     Appearance: Normal appearance.   HENT:      Head: Normocephalic.   Eyes:      Conjunctiva/sclera: Conjunctivae normal.   Pulmonary:      Effort: Pulmonary effort is normal. No respiratory distress.   Musculoskeletal:         General: Normal range of motion.      Cervical back: Normal range of motion.   Skin:     General: Skin is warm and dry.   Neurological:      Mental Status: She is alert and oriented to person, place, and time.   Psychiatric:         Mood and Affect: Mood normal.         Behavior: Behavior normal.               Assessment:         ICD-10-CM ICD-9-CM   1. Epigastric pain  R10.13 789.06   2. Nausea and vomiting, unspecified vomiting type  R11.2 787.01   3. Gastroesophageal reflux disease, unspecified whether esophagitis present  K21.9 530.81       Plan:       Epigastric pain  -     famotidine (PEPCID) 20 MG tablet; Take 1 tablet (20 mg total) by mouth 2 (two) times daily.  Dispense: 60 tablet; Refill: 2  -     NM Gastric Emptying; Future; Expected date: 10/30/2023  -     Case Request Endoscopy: EGD (ESOPHAGOGASTRODUODENOSCOPY)    Nausea and vomiting, unspecified vomiting type  -     NM Gastric Emptying; Future; Expected date: 10/30/2023  -     Case Request Endoscopy: EGD (ESOPHAGOGASTRODUODENOSCOPY)    Gastroesophageal reflux disease, unspecified whether esophagitis present  -     famotidine (PEPCID) 20 MG tablet; Take 1 tablet (20 mg total) by mouth 2 (two) times daily.  Dispense: 60 tablet; Refill: 2  -     Case Request Endoscopy: EGD (ESOPHAGOGASTRODUODENOSCOPY)      Follow up if symptoms worsen or fail to improve.     Patient's Medications   New Prescriptions    FAMOTIDINE (PEPCID) 20 MG TABLET    Take 1 tablet (20 mg total) by mouth 2 (two) times daily.   Previous Medications    ESTRADIOL (ESTRACE) 0.01 % (0.1 MG/GRAM) VAGINAL CREAM    Place 1 g " vaginally once daily.    GABAPENTIN (NEURONTIN) 300 MG CAPSULE    Take 600 mg by mouth every evening.    INSULIN DETEMIR U-100 (LEVEMIR FLEXTOUCH) 100 UNIT/ML (3 ML) SUBQ INPN PEN    Inject 100 mLs into the skin.    MELATONIN 10 MG CAP    Take 10 mg by mouth.    NAPROXEN SODIUM (ANAPROX) 220 MG TABLET    Take 220 mg by mouth.    RESTASIS 0.05 % OPHTHALMIC EMULSION    Place 1 drop into both eyes 2 (two) times daily.    RESVERATROL 250 MG CAP    Take by mouth.    UBIDECARENONE (COENZYME Q10) 100 MG TAB    Take 150 mg by mouth.    ZINC ORAL    Take 10 mg by mouth.   Modified Medications    No medications on file   Discontinued Medications    ASCORBIC ACID, VITAMIN C, (VITAMIN C) 500 MG TABLET    Take 500 mg by mouth.    CHOLECALCIFEROL, VITAMIN D3, (VITAMIN D3) 50 MCG (2,000 UNIT) TAB    Take 2,000 Units by mouth.    CINNAMON BARK 500 MG CAPSULE    Take 500 mg by mouth.

## 2023-11-21 ENCOUNTER — TELEPHONE (OUTPATIENT)
Dept: GASTROENTEROLOGY | Facility: CLINIC | Age: 68
End: 2023-11-21
Payer: MEDICARE

## 2023-11-22 ENCOUNTER — TELEPHONE (OUTPATIENT)
Dept: GASTROENTEROLOGY | Facility: CLINIC | Age: 68
End: 2023-11-22
Payer: MEDICARE

## 2023-11-22 NOTE — TELEPHONE ENCOUNTER
Called and spoke with pt. Informed pt that Dr Zeng ordered an esophagram. Pt scheduled esophagram for Monday 11/27/2023 at 10:00am. Informed pt nothing to eat or drink after midnight. Pt verbalized understanding.             ----- Message from Brigitte Zeng MD sent at 11/22/2023 11:25 AM CST -----  Please schedule esophagram ordered today at EGD

## 2023-11-29 ENCOUNTER — TELEPHONE (OUTPATIENT)
Dept: GASTROENTEROLOGY | Facility: CLINIC | Age: 68
End: 2023-11-29
Payer: MEDICARE

## 2023-11-29 NOTE — TELEPHONE ENCOUNTER
Called and spoke with pt regarding EGD results and esophagram results. Informed pt to continue taking pepcid and RTC as needed. Pt verbalized understanding.         ----- Message from Brigitte Zeng MD sent at 11/27/2023  1:07 PM CST -----  HP (-), and esophagram is also normal.  Continue PPI, RTC as needed

## 2024-05-10 ENCOUNTER — OFFICE VISIT (OUTPATIENT)
Dept: ENDOCRINOLOGY | Facility: CLINIC | Age: 69
End: 2024-05-10
Payer: MEDICARE

## 2024-05-10 ENCOUNTER — LAB VISIT (OUTPATIENT)
Dept: LAB | Facility: HOSPITAL | Age: 69
End: 2024-05-10
Attending: STUDENT IN AN ORGANIZED HEALTH CARE EDUCATION/TRAINING PROGRAM
Payer: MEDICARE

## 2024-05-10 VITALS
HEART RATE: 58 BPM | HEIGHT: 60 IN | BODY MASS INDEX: 31.19 KG/M2 | SYSTOLIC BLOOD PRESSURE: 130 MMHG | RESPIRATION RATE: 17 BRPM | DIASTOLIC BLOOD PRESSURE: 86 MMHG | WEIGHT: 158.88 LBS

## 2024-05-10 DIAGNOSIS — Z79.4 TYPE 2 DIABETES MELLITUS WITH HYPERGLYCEMIA, WITH LONG-TERM CURRENT USE OF INSULIN: ICD-10-CM

## 2024-05-10 DIAGNOSIS — R10.13 EPIGASTRIC PAIN: ICD-10-CM

## 2024-05-10 DIAGNOSIS — E11.65 TYPE 2 DIABETES MELLITUS WITH HYPERGLYCEMIA, WITH LONG-TERM CURRENT USE OF INSULIN: Primary | ICD-10-CM

## 2024-05-10 DIAGNOSIS — E11.65 TYPE 2 DIABETES MELLITUS WITH HYPERGLYCEMIA, WITH LONG-TERM CURRENT USE OF INSULIN: ICD-10-CM

## 2024-05-10 DIAGNOSIS — E78.2 MIXED HYPERLIPIDEMIA: ICD-10-CM

## 2024-05-10 DIAGNOSIS — Z79.4 TYPE 2 DIABETES MELLITUS WITH HYPERGLYCEMIA, WITH LONG-TERM CURRENT USE OF INSULIN: Primary | ICD-10-CM

## 2024-05-10 DIAGNOSIS — E11.42 POLYNEUROPATHY DUE TO TYPE 2 DIABETES MELLITUS: ICD-10-CM

## 2024-05-10 LAB
ALBUMIN SERPL BCP-MCNC: 4.1 G/DL (ref 3.5–5.2)
ALBUMIN/CREAT UR: 20.1 UG/MG (ref 0–30)
ALP SERPL-CCNC: 95 U/L (ref 55–135)
ALT SERPL W/O P-5'-P-CCNC: 23 U/L (ref 10–44)
ANION GAP SERPL CALC-SCNC: 11 MMOL/L (ref 8–16)
AST SERPL-CCNC: 21 U/L (ref 10–40)
BILIRUB SERPL-MCNC: 0.8 MG/DL (ref 0.1–1)
BUN SERPL-MCNC: 12 MG/DL (ref 8–23)
C PEPTIDE SERPL-MCNC: 1.75 NG/ML (ref 0.78–5.19)
CALCIUM SERPL-MCNC: 9.7 MG/DL (ref 8.7–10.5)
CHLORIDE SERPL-SCNC: 103 MMOL/L (ref 95–110)
CHOLEST SERPL-MCNC: 304 MG/DL (ref 120–199)
CHOLEST/HDLC SERPL: 6.9 {RATIO} (ref 2–5)
CO2 SERPL-SCNC: 25 MMOL/L (ref 23–29)
CREAT SERPL-MCNC: 0.9 MG/DL (ref 0.5–1.4)
CREAT UR-MCNC: 54.6 MG/DL (ref 15–325)
EST. GFR  (NO RACE VARIABLE): >60 ML/MIN/1.73 M^2
ESTIMATED AVG GLUCOSE: 252 MG/DL (ref 68–131)
GLUCOSE SERPL-MCNC: 171 MG/DL (ref 70–110)
HBA1C MFR BLD: 10.4 % (ref 4–5.6)
HDLC SERPL-MCNC: 44 MG/DL (ref 40–75)
HDLC SERPL: 14.5 % (ref 20–50)
LDLC SERPL CALC-MCNC: 195.8 MG/DL (ref 63–159)
MICROALBUMIN UR DL<=1MG/L-MCNC: 11 UG/ML
NONHDLC SERPL-MCNC: 260 MG/DL
POTASSIUM SERPL-SCNC: 4.6 MMOL/L (ref 3.5–5.1)
PROT SERPL-MCNC: 7.1 G/DL (ref 6–8.4)
SODIUM SERPL-SCNC: 139 MMOL/L (ref 136–145)
TRIGL SERPL-MCNC: 321 MG/DL (ref 30–150)
TSH SERPL DL<=0.005 MIU/L-ACNC: 1.64 UIU/ML (ref 0.4–4)

## 2024-05-10 PROCEDURE — 99999 PR PBB SHADOW E&M-EST. PATIENT-LVL III: CPT | Mod: PBBFAC,,, | Performed by: STUDENT IN AN ORGANIZED HEALTH CARE EDUCATION/TRAINING PROGRAM

## 2024-05-10 PROCEDURE — 83036 HEMOGLOBIN GLYCOSYLATED A1C: CPT | Performed by: STUDENT IN AN ORGANIZED HEALTH CARE EDUCATION/TRAINING PROGRAM

## 2024-05-10 PROCEDURE — 82043 UR ALBUMIN QUANTITATIVE: CPT | Performed by: STUDENT IN AN ORGANIZED HEALTH CARE EDUCATION/TRAINING PROGRAM

## 2024-05-10 PROCEDURE — 99999 PR STA SHADOW: CPT | Mod: PBBFAC,,, | Performed by: STUDENT IN AN ORGANIZED HEALTH CARE EDUCATION/TRAINING PROGRAM

## 2024-05-10 PROCEDURE — 84681 ASSAY OF C-PEPTIDE: CPT | Performed by: STUDENT IN AN ORGANIZED HEALTH CARE EDUCATION/TRAINING PROGRAM

## 2024-05-10 PROCEDURE — 99214 OFFICE O/P EST MOD 30 MIN: CPT | Mod: S$PBB | Performed by: STUDENT IN AN ORGANIZED HEALTH CARE EDUCATION/TRAINING PROGRAM

## 2024-05-10 PROCEDURE — 99213 OFFICE O/P EST LOW 20 MIN: CPT | Mod: PBBFAC | Performed by: STUDENT IN AN ORGANIZED HEALTH CARE EDUCATION/TRAINING PROGRAM

## 2024-05-10 PROCEDURE — G2211 COMPLEX E/M VISIT ADD ON: HCPCS | Mod: S$PBB | Performed by: STUDENT IN AN ORGANIZED HEALTH CARE EDUCATION/TRAINING PROGRAM

## 2024-05-10 PROCEDURE — 36415 COLL VENOUS BLD VENIPUNCTURE: CPT | Performed by: STUDENT IN AN ORGANIZED HEALTH CARE EDUCATION/TRAINING PROGRAM

## 2024-05-10 PROCEDURE — 84443 ASSAY THYROID STIM HORMONE: CPT | Performed by: STUDENT IN AN ORGANIZED HEALTH CARE EDUCATION/TRAINING PROGRAM

## 2024-05-10 PROCEDURE — 80053 COMPREHEN METABOLIC PANEL: CPT | Performed by: STUDENT IN AN ORGANIZED HEALTH CARE EDUCATION/TRAINING PROGRAM

## 2024-05-10 PROCEDURE — 80061 LIPID PANEL: CPT | Performed by: STUDENT IN AN ORGANIZED HEALTH CARE EDUCATION/TRAINING PROGRAM

## 2024-05-10 RX ORDER — INSULIN GLARGINE 100 [IU]/ML
INJECTION, SOLUTION SUBCUTANEOUS
COMMUNITY
End: 2024-05-10 | Stop reason: SDUPTHER

## 2024-05-10 RX ORDER — INSULIN GLARGINE 100 [IU]/ML
34 INJECTION, SOLUTION SUBCUTANEOUS DAILY
Qty: 12 ML | Refills: 11 | Status: SHIPPED | OUTPATIENT
Start: 2024-05-10

## 2024-05-10 RX ORDER — BLOOD-GLUCOSE SENSOR
EACH MISCELLANEOUS
COMMUNITY

## 2024-05-10 NOTE — ASSESSMENT & PLAN NOTE
Glucose control appears improved with fasting glucose mostly in the 100s. She has variable prandial spikes depending on intake. We restarted CGM in office today.    She reports GI intolerance to SGLT-2 inhibitor and metformin. Given she has baseline GI symptoms we are avoiding GLP-1 RA.    Plan  - Continue Basaglar 34 U daily  - Continue Novolog SSI 4 times daily  - Continue FreeStyle Augustin 3 CGM    Labs today    F/u 6 months

## 2024-05-10 NOTE — PROGRESS NOTES
Subjective:      Patient ID: Terra Mcpherson is a 69 y.o. female.    Chief Complaint:  Type 2 diabetes mellitus    History of Present Illness  This is a 69 y.o. female. with a past medical history of Type 2 diabetes mellitus here for follow up    Type 2 diabetes mellitus    Current diabetes medications:  - Basaglar 34 U daily  - Novolog SSI 4 times a day      Past diabetes medications:  - Metformin - intolerance  - Farxiga - intolerance      Lab Results   Component Value Date    CREATININE 0.74 02/28/2024    EGFRNORACEVR >60.0 02/28/2024       Known diabetic complications: peripheral neuropathy    Weight trend:  Wt Readings from Last 8 Encounters:   05/10/24 72.1 kg (158 lb 14.4 oz)   02/28/24 71.5 kg (157 lb 10.1 oz)   02/26/24 68 kg (150 lb)   11/22/23 70.5 kg (155 lb 6.8 oz)   10/30/23 72.2 kg (159 lb 2.8 oz)   10/02/23 71.7 kg (158 lb)   06/02/23 70.3 kg (155 lb)   03/17/23 72 kg (158 lb 11.7 oz)       Family history of diabetes:  Yes, mother and everyone in her side of the family  Siblings +  Children have been checked, negative for diabetes    Prior visit with diabetes education: yes    Current diet: 3 meals per day  Current exercise: On her feet all day    Blood glucose monitoring at home: 4x/day  Home blood sugar records: , mostly 100s  Any episodes of hypoglycemia? Yes    Diabetes Management Status  Statin: Not taking  ACE/ARB: Not taking    Screening or Prevention Patient's value   HgA1C Testing and Control   Lab Results   Component Value Date    HGBA1C 9.2 (H) 03/17/2023        LDL control Lab Results   Component Value Date    LDLCALC 197.8 (H) 03/17/2023      Nephropathy screening Lab Results   Component Value Date    MICALBCREAT 5.8 03/17/2023        Last eye exam: Most Recent Eye Exam Date: Not Found      Review of Systems  As above    Social and family history reviewed  Current medications and allergies reviewed    Objective:   /86   Pulse (!) 58   Resp 17   Ht 5' (1.524 m)   Wt 72.1  kg (158 lb 14.4 oz)   BMI 31.03 kg/m²   Physical Exam  Alert, oriented    BP Readings from Last 1 Encounters:   05/10/24 130/86      Wt Readings from Last 1 Encounters:   05/10/24 1036 72.1 kg (158 lb 14.4 oz)     Body mass index is 31.03 kg/m².    Lab Review:   Lab Results   Component Value Date    HGBA1C 9.2 (H) 03/17/2023     Lab Results   Component Value Date    CHOL 311 (H) 03/17/2023    HDL 38 (L) 03/17/2023    LDLCALC 197.8 (H) 03/17/2023    TRIG 376 (H) 03/17/2023    CHOLHDL 12.2 (L) 03/17/2023     Lab Results   Component Value Date     02/28/2024    K 4.3 02/28/2024     02/28/2024    CO2 23 02/28/2024     (H) 02/28/2024    BUN 13 02/28/2024    CREATININE 0.74 02/28/2024    CALCIUM 8.9 02/28/2024    PROT 7.0 02/28/2024    ALBUMIN 3.9 02/28/2024    BILITOT 0.7 02/28/2024    ALKPHOS 105 02/28/2024    AST 24 02/28/2024    ALT 26 02/28/2024    ANIONGAP 10 02/28/2024    TSH 2.560 06/02/2023       All pertinent labs reviewed    Assessment and Plan     Type 2 diabetes mellitus with hyperglycemia, with long-term current use of insulin  Glucose control appears improved with fasting glucose mostly in the 100s. She has variable prandial spikes depending on intake. We restarted CGM in office today.    She reports GI intolerance to SGLT-2 inhibitor and metformin. Given she has baseline GI symptoms we are avoiding GLP-1 RA.    Plan  - Continue Basaglar 34 U daily  - Continue Novolog SSI 4 times daily  - Continue FreeStyle Augustin 3 CGM    Labs today    F/u 6 months    Epigastric pain  Followed by GI  Avoid oral agents which she reports worsen symptoms    Mixed hyperlipidemia  Reports allergy to statins  LDL is above goal  Consider Cardiology for PCSK-9i therapy based on repeat lipids        Chris Moss MD  Endocrinology

## 2024-05-10 NOTE — ASSESSMENT & PLAN NOTE
Reports allergy to statins  LDL is above goal  Consider Cardiology for PCSK-9i therapy based on repeat lipids

## 2024-05-14 DIAGNOSIS — E78.2 MIXED HYPERLIPIDEMIA: Primary | ICD-10-CM

## 2024-05-14 DIAGNOSIS — Z78.9 STATIN INTOLERANCE: ICD-10-CM

## 2024-05-20 ENCOUNTER — TELEPHONE (OUTPATIENT)
Dept: ENDOCRINOLOGY | Facility: CLINIC | Age: 69
End: 2024-05-20
Payer: MEDICARE

## 2024-05-20 NOTE — TELEPHONE ENCOUNTER
----- Message from Stephy Sandoval sent at 2024  9:24 AM CDT -----  Contact: Patient  Terra Hernandez May  MRN: [id number]  : 1955  PCP: Pola Machado  Home Phone      [phone]  Work Phone      Not on file.  Mobile          [phone]      MESSAGE: [facility] georgiana Parkinson says the patient can't get her insulin prescription until  and she is completely out of insulin. Please return this call    PHONE; [phone]

## 2024-05-20 NOTE — TELEPHONE ENCOUNTER
Spoke with pharmacy and they state that the patient should have received two boxes of her Basaglar insulin. Pharmacy said they will look back to see if she received two and will contact the patient to verify. Contacted the patient to find out if she spoke with her pharmacy regarding the basaglar insulin and the patient states that she was not given two boxes and that the pharmacy looked at their cameras and only gave her one box. Pt states the pharmacy told her that they could not give her another box and that she had to call her insurance. Pt called insurance and was on phone for 2 hours without anything being resolved. Contacted the pharmacy again and spoke with them regarding this and they were on the phone with the patietn at the same time and the patient told them she didn't have the label anymore that said 1 of 2 on the box. Pharmacy states they will look at their cameras again and see if she was only given one box of her basaglar insulin and if she were they will make right and give her the other box.

## 2024-06-06 ENCOUNTER — OFFICE VISIT (OUTPATIENT)
Dept: CARDIOLOGY | Facility: CLINIC | Age: 69
End: 2024-06-06
Payer: MEDICARE

## 2024-06-06 VITALS
RESPIRATION RATE: 18 BRPM | DIASTOLIC BLOOD PRESSURE: 80 MMHG | BODY MASS INDEX: 31.42 KG/M2 | OXYGEN SATURATION: 94 % | WEIGHT: 160.06 LBS | SYSTOLIC BLOOD PRESSURE: 126 MMHG | HEART RATE: 72 BPM | HEIGHT: 60 IN

## 2024-06-06 DIAGNOSIS — Z78.9 STATIN INTOLERANCE: ICD-10-CM

## 2024-06-06 DIAGNOSIS — E78.2 MIXED HYPERLIPIDEMIA: Primary | ICD-10-CM

## 2024-06-06 DIAGNOSIS — T46.6X5A ADVERSE EFFECT OF HMG-COA REDUCTASE INHIBITOR: ICD-10-CM

## 2024-06-06 DIAGNOSIS — Z91.89 AT HIGH RISK FOR CORONARY ARTERY DISEASE: ICD-10-CM

## 2024-06-06 DIAGNOSIS — I87.2 VENOUS INSUFFICIENCY: ICD-10-CM

## 2024-06-06 DIAGNOSIS — R94.31 EKG ABNORMALITY: ICD-10-CM

## 2024-06-06 DIAGNOSIS — R01.1 CARDIAC MURMUR: ICD-10-CM

## 2024-06-06 NOTE — PROGRESS NOTES
[facility]    CC: HLD    Patient ID: Terra Mcpherson is a 69 y.o. female with a past medical history of HLD, venous insufficiency, EKG abnormality, statin intolerance, diabetes mellitus    HPI  Referred from Endocrinology for hyperlipidemia    Hyperlipidemia; cholesterol 304, HDL 44, .8, triglycerides 321  No cholesterol medication as she reports oral meds in general gives her stomach pains  She has tried statins therapy as well as fenofibrate in the past    Abnormal EKG; EKG with concerns for anterior ischemia  She reports having a stress test and left heart catheterization approximately 20 years ago in [facility] for shortness of breath.  She reports testing was normal.    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      Past Surgical History:   Procedure Laterality Date    DILATION AND CURETTAGE OF UTERUS      ESOPHAGOGASTRODUODENOSCOPY      ESOPHAGOGASTRODUODENOSCOPY N/A 11/22/2023    Procedure: EGD (ESOPHAGOGASTRODUODENOSCOPY);  Surgeon: Brigitte Zeng MD;  Location: [facility];  Service: Endoscopy;  Laterality: N/A;    EYE SURGERY Bilateral     Cataract Surgery    HYSTERECTOMY      VAGINAL DELIVERY      x2     Social History     Socioeconomic History    Marital status:    Tobacco Use    Smoking status: Never    Smokeless tobacco: Never   Substance and Sexual Activity    Alcohol use: Never    Drug use: Yes     Types: Other-see comments     Comment: 5CBD-gummies for sleep    Sexual activity: Not Currently     Family History   Problem Relation Name Age of Onset    No Known Problems Mother      No Known Problems  Father      Colon cancer Other Niece        Review of patient's allergies indicates:   Allergen Reactions    Statins-hmg-coa reductase inhibitors Anaphylaxis and Rash     Swelling / Pancreatitis      Adhesive Itching     To [employer] brand HRT patch and generic only.     Fenofibrate      Fingers turn purple     Metformin Other (See Comments)     Myalgia /GI UPSET      Omeprazole Other (See Comments)     Patient claims she experienced hair loss, finger nails falling off, rash, and edema due to medication.     Pamabrom      INTOLERANCE/ SEVERE DEHYDRATION : SIDE EFFECTS    Sulfa (sulfonamide antibiotics) Rash       Medication List with Changes/Refills   Current Medications    5-HYDROXYTRYPTOPHAN,5HTP,-B6-C ORAL    Take 1 tablet by mouth daily as needed.    BASAGLAR KWIKPEN U-100 INSULIN 100 UNIT/ML (3 ML) INPN PEN    Inject 34 Units into the skin once daily.    BLOOD-GLUCOSE SENSOR (FREESTYLE ASMITA 3 SENSOR) MIRZA    by Misc.(Non-Drug; Combo Route) route.    CETIRIZINE (ZYRTEC) 10 MG TABLET    Take 10 mg by mouth daily as needed for Allergies.    ESTRADIOL (ESTRACE) 0.01 % (0.1 MG/GRAM) VAGINAL CREAM    Place 1 g vaginally once daily.    FAMOTIDINE (PEPCID) 20 MG TABLET    Take 1 tablet (20 mg total) by mouth 2 (two) times daily.    GABAPENTIN (NEURONTIN) 300 MG CAPSULE    Take 600 mg by mouth every evening.    MELATONIN 10 MG CAP    Take 10 mg by mouth.    MULTIVIT-MIN/IRON/FA/VIT K/LUT (CENTRUM SILVER WOMEN ORAL)    Take 1 tablet by mouth Daily.    NAPROXEN SODIUM (ANAPROX) 220 MG TABLET    Take 220 mg by mouth daily as needed.    RESTASIS 0.05 % OPHTHALMIC EMULSION    Place 1 drop into both eyes 2 (two) times daily.    RESVERATROL 250 MG CAP    Take by mouth.    UBIDECARENONE (COENZYME Q10) 100 MG TAB    Take 150 mg by mouth.           Review of Systems   Constitutional: Positive for malaise/fatigue.   Cardiovascular:  Positive for dyspnea on exertion.   Respiratory: Negative.     Musculoskeletal: Negative.   "      Vitals:    06/06/24 1021   BP: 126/80   Pulse: 72   Resp: 18   SpO2: (!) 94%   Weight: 72.6 kg (160 lb 0.9 oz)   Height: 5' (1.524 m)          Physical Exam  Cardiovascular:      Rate and Rhythm: Normal rate and regular rhythm.      Heart sounds: Murmur heard.   Pulmonary:      Effort: Pulmonary effort is normal.      Breath sounds: Normal breath sounds.   Musculoskeletal:         General: Normal range of motion.   Skin:     General: Skin is warm and dry.      Capillary Refill: Capillary refill takes less than 2 seconds.   Neurological:      Mental Status: She is alert and oriented to person, place, and time.   Psychiatric:         Mood and Affect: Mood normal.           Labs:  Most Recent Data  CBC:   Lab Results   Component Value Date    WBC 8.27 02/28/2024    HGB 14.2 02/28/2024    HCT 38.4 02/28/2024     02/28/2024    MCV 77 (L) 02/28/2024    RDW 13.2 02/28/2024     BMP:   Lab Results   Component Value Date     05/10/2024    K 4.6 05/10/2024     05/10/2024    CO2 25 05/10/2024    BUN 12 05/10/2024    CREATININE 0.9 05/10/2024     (H) 05/10/2024    CALCIUM 9.7 05/10/2024     LFTS;   Lab Results   Component Value Date    PROT 7.1 05/10/2024    ALBUMIN 4.1 05/10/2024    BILITOT 0.8 05/10/2024    AST 21 05/10/2024    ALKPHOS 95 05/10/2024    ALT 23 05/10/2024     COAGS: No results found for: "INR", "PROTIME", "PTT"  FLP:   Lab Results   Component Value Date    CHOL 304 (H) 05/10/2024    HDL 44 05/10/2024    LDLCALC 195.8 (H) 05/10/2024    TRIG 321 (H) 05/10/2024    CHOLHDL 14.5 (L) 05/10/2024     CARDIAC:   Lab Results   Component Value Date    TROPONINI <0.006 02/26/2024       Assessment/Plan:  Problem List Items Addressed This Visit          Cardiac/Vascular    Venous insufficiency    Mixed hyperlipidemia - Primary    EKG abnormality    At high risk for coronary artery disease    Cardiac murmur       Other    Adverse effect of HMG-CoA reductase inhibitor    Statin intolerance "       Recommended initiation of baby aspirin and PCSK9 inhibitor given significantly elevated lipids, however patient is reluctant to start.  She reports she is unable to tolerate oral medications due to stomach pains.  She requested some literature on medications which was provided today.  We will discuss possible initiation visit.      I recommended the initiation of baby aspirin given significantly elevated ASCVD risk at 20%.  She is reluctant to start given concerns of bruising.    I am requesting records from [place] for cardiac history, however I explained to patient that testing should be repeated since last testing was greater than 20 years ago.  I recommend echocardiogram to assess LV function and valvular structure, as well as exercise stress test given abnormal EKG, REEVES, elevated ASCVD risk.    Lots of education provided on all diagnoses, as well as handouts attached to her AVF today.    Better control diabetes; A1c 10.4    Will follow-up in proximally 4-8 weeks      Total duration of face to face visit time 30 minutes.  Total time spent counseling greater than fifty percent of total visit time.  Counseling included discussion regarding imaging findings, diagnosis, possibilities, treatment options, risks and benefits.  The patient had many questions regarding the options and long-term effects.    Mer Murphy, FNP-C  Cardiology Clinic  [facility]

## 2024-06-17 ENCOUNTER — HOSPITAL ENCOUNTER (OUTPATIENT)
Dept: PULMONOLOGY | Facility: HOSPITAL | Age: 69
Discharge: HOME | End: 2024-06-17
Attending: NURSE PRACTITIONER
Payer: MEDICARE

## 2024-06-17 DIAGNOSIS — R94.31 EKG ABNORMALITY: ICD-10-CM

## 2024-06-17 DIAGNOSIS — R01.1 CARDIAC MURMUR: ICD-10-CM

## 2024-06-17 DIAGNOSIS — Z91.89 AT HIGH RISK FOR CORONARY ARTERY DISEASE: ICD-10-CM

## 2024-06-17 LAB
AV INDEX (PROSTH): 0.66
AV MEAN GRADIENT: 3 MMHG
AV PEAK GRADIENT: 7 MMHG
AV VALVE AREA BY VELOCITY RATIO: 2.06 CM²
AV VALVE AREA: 2.09 CM²
AV VELOCITY RATIO: 0.65
CV ECHO LV RWT: 0.48 CM
DOP CALC AO PEAK VEL: 1.28 M/S
DOP CALC AO VTI: 31.1 CM
DOP CALC LVOT AREA: 3.2 CM2
DOP CALC LVOT DIAMETER: 2.01 CM
DOP CALC LVOT PEAK VEL: 0.83 M/S
DOP CALC LVOT STROKE VOLUME: 65.02 CM3
DOP CALCLVOT PEAK VEL VTI: 20.5 CM
E WAVE DECELERATION TIME: 170.47 MSEC
E/A RATIO: 0.93
E/E' RATIO: 8 M/S
ECHO LV POSTERIOR WALL: 0.79 CM (ref 0.6–1.1)
FRACTIONAL SHORTENING: 43 % (ref 28–44)
INTERVENTRICULAR SEPTUM: 1.17 CM (ref 0.6–1.1)
IVC DIAMETER: 1.68 CM
LA MAJOR: 3.93 CM
LEFT ATRIUM SIZE: 3.06 CM
LEFT INTERNAL DIMENSION IN SYSTOLE: 1.87 CM (ref 2.1–4)
LEFT VENTRICLE DIASTOLIC VOLUME: 42.84 ML
LEFT VENTRICLE SYSTOLIC VOLUME: 10.67 ML
LEFT VENTRICULAR INTERNAL DIMENSION IN DIASTOLE: 3.26 CM (ref 3.5–6)
LEFT VENTRICULAR MASS: 90.12 G
LV LATERAL E/E' RATIO: 6.8 M/S
LV SEPTAL E/E' RATIO: 9.71 M/S
LVOT MG: 1.55 MMHG
LVOT MV: 0.59 CM/S
MV PEAK A VEL: 0.73 M/S
MV PEAK E VEL: 0.68 M/S
MV STENOSIS PRESSURE HALF TIME: 48.57 MS
MV VALVE AREA P 1/2 METHOD: 4.53 CM2
OHS CV RV/LV RATIO: 0.86 CM
PISA MRMAX VEL: 4.77 M/S
PULM VEIN S/D RATIO: 1.48
PV MV: 0.5 M/S
PV PEAK D VEL: 0.4 M/S
PV PEAK GRADIENT: 2 MMHG
PV PEAK S VEL: 0.59 M/S
PV PEAK VELOCITY: 0.76 M/S
RA MAJOR: 4.66 CM
RA PRESSURE ESTIMATED: 3 MMHG
RIGHT VENTRICULAR END-DIASTOLIC DIMENSION: 2.79 CM
RV TISSUE DOPPLER FREE WALL SYSTOLIC VELOCITY 1 (APICAL 4 CHAMBER VIEW): 15.73 CM/S
TDI LATERAL: 0.1 M/S
TDI SEPTAL: 0.07 M/S
TDI: 0.09 M/S
TRICUSPID ANNULAR PLANE SYSTOLIC EXCURSION: 2.14 CM

## 2024-06-17 PROCEDURE — 93306 TTE W/DOPPLER COMPLETE: CPT

## 2024-06-17 PROCEDURE — 93306 TTE W/DOPPLER COMPLETE: CPT | Mod: 26,,, | Performed by: STUDENT IN AN ORGANIZED HEALTH CARE EDUCATION/TRAINING PROGRAM

## 2024-06-20 ENCOUNTER — HOSPITAL ENCOUNTER (OUTPATIENT)
Dept: PULMONOLOGY | Facility: HOSPITAL | Age: 69
Discharge: HOME | End: 2024-06-20
Attending: NURSE PRACTITIONER
Payer: MEDICARE

## 2024-06-20 LAB
CV STRESS BASE HR: 85 BPM
DIASTOLIC BLOOD PRESSURE: 82 MMHG
OHS CV CPX 1 MINUTE RECOVERY HEART RATE: 134 BPM
OHS CV CPX 85 PERCENT MAX PREDICTED HEART RATE MALE: 128
OHS CV CPX ESTIMATED METS: 7
OHS CV CPX MAX PREDICTED HEART RATE: 150
OHS CV CPX PATIENT IS FEMALE: 1
OHS CV CPX PATIENT IS MALE: 0
OHS CV CPX PEAK DIASTOLIC BLOOD PRESSURE: 79 MMHG
OHS CV CPX PEAK HEAR RATE: 151 BPM
OHS CV CPX PEAK RATE PRESSURE PRODUCT: NORMAL
OHS CV CPX PEAK SYSTOLIC BLOOD PRESSURE: 210 MMHG
OHS CV CPX PERCENT MAX PREDICTED HEART RATE ACHIEVED: 105
OHS CV CPX RATE PRESSURE PRODUCT PRESENTING: NORMAL
STRESS ECHO POST EXERCISE DUR MIN: 6 MINUTES
STRESS ECHO POST EXERCISE DUR SEC: 5 SECONDS
SYSTOLIC BLOOD PRESSURE: 152 MMHG

## 2024-06-20 PROCEDURE — 93017 CV STRESS TEST TRACING ONLY: CPT

## 2024-07-09 ENCOUNTER — OFFICE VISIT (OUTPATIENT)
Dept: CARDIOLOGY | Facility: CLINIC | Age: 69
End: 2024-07-09
Payer: MEDICARE

## 2024-07-09 VITALS
HEART RATE: 67 BPM | WEIGHT: 158.75 LBS | HEIGHT: 60 IN | RESPIRATION RATE: 18 BRPM | OXYGEN SATURATION: 98 % | BODY MASS INDEX: 31.17 KG/M2 | DIASTOLIC BLOOD PRESSURE: 74 MMHG | SYSTOLIC BLOOD PRESSURE: 130 MMHG

## 2024-07-09 DIAGNOSIS — Z78.9 STATIN INTOLERANCE: ICD-10-CM

## 2024-07-09 DIAGNOSIS — R01.1 CARDIAC MURMUR: ICD-10-CM

## 2024-07-09 DIAGNOSIS — Z79.4 TYPE 2 DIABETES MELLITUS WITH HYPERGLYCEMIA, WITH LONG-TERM CURRENT USE OF INSULIN: ICD-10-CM

## 2024-07-09 DIAGNOSIS — Z91.89 AT HIGH RISK FOR CORONARY ARTERY DISEASE: ICD-10-CM

## 2024-07-09 DIAGNOSIS — Z13.6 ENCOUNTER FOR SCREENING FOR CARDIOVASCULAR DISORDERS: ICD-10-CM

## 2024-07-09 DIAGNOSIS — T46.6X5A ADVERSE EFFECT OF HMG-COA REDUCTASE INHIBITOR: ICD-10-CM

## 2024-07-09 DIAGNOSIS — E11.65 TYPE 2 DIABETES MELLITUS WITH HYPERGLYCEMIA, WITH LONG-TERM CURRENT USE OF INSULIN: ICD-10-CM

## 2024-07-09 DIAGNOSIS — R94.31 EKG ABNORMALITY: ICD-10-CM

## 2024-07-09 DIAGNOSIS — E78.2 MIXED HYPERLIPIDEMIA: ICD-10-CM

## 2024-07-09 DIAGNOSIS — E78.00 HYPERCHOLESTEROLEMIA WITH LDL GREATER THAN 190 MG/DL: Primary | ICD-10-CM

## 2024-07-09 DIAGNOSIS — I87.2 VENOUS INSUFFICIENCY: ICD-10-CM

## 2024-07-09 NOTE — PROGRESS NOTES
Cardiology Clinic note    Subjective:   Patient ID:  Terra Mcpherson is a 69 y.o. female who presents for follow-up of hyperlipidemia, abnormal EKG, elevated risk coronary artery    HPI:   Terra Mcpherson  has a past medical history of Dehydration, Diabetes mellitus, Hiatal hernia, and Liver disease after Fontan procedure.    Here for follow up of HLD     Hyperlipidemia; cholesterol 304, HDL 44, .8, triglycerides 321  No cholesterol medication as she reports oral meds in general gives her stomach pains  She has tried statins therapy as well as fenofibrate in the past     Abnormal EKG; EKG with concerns for anterior ischemia since 2022  She reports having a stress test and left heart catheterization approximately 20 years ago in [facility] for shortness of breath.  She reports testing was normal.  EF normal  6/24 stress test abnormal but not diagnostic     Venous insufficiency; reports mild swelling towards the end of the day   She reports she was unable to tolerate diuretic due to dehydration     Diabetes mellitus type 2; A1c 10.4 uncontrolled  Sees endocrinology     ASCVD risk elevated; 20%     She denies family history of heart disease         Patient Active Problem List    Diagnosis Date Noted    Statin intolerance 06/06/2024    EKG abnormality 06/06/2024    At high risk for coronary artery disease 06/06/2024    Cardiac murmur 06/06/2024    Nausea and vomiting 10/30/2023    Gastroesophageal reflux disease 10/30/2023    Epigastric pain 10/02/2023    Anxiety 10/02/2023    Polyneuropathy due to type 2 diabetes mellitus 03/17/2023    Type 2 diabetes mellitus with hyperglycemia, with long-term current use of insulin 03/17/2023    Venous insufficiency 03/17/2023    Mixed hyperlipidemia 03/17/2023    Adverse effect of HMG-CoA reductase inhibitor 01/19/2023       Patient's Medications   New Prescriptions    No medications on file   Previous Medications    BASAGLAR KWIKPEN U-100 INSULIN 100 UNIT/ML  (3 ML) INPN PEN    Inject 34 Units into the skin once daily.    BLOOD-GLUCOSE SENSOR (FREESTYLE ASMITA 3 SENSOR) MIRZA    by Misc.(Non-Drug; Combo Route) route.    ESTRADIOL (ESTRACE) 0.01 % (0.1 MG/GRAM) VAGINAL CREAM    Place 1 g vaginally once daily.    FAMOTIDINE (PEPCID) 20 MG TABLET    Take 1 tablet (20 mg total) by mouth 2 (two) times daily.    MELATONIN 10 MG CAP    Take 10 mg by mouth.    MULTIVIT-MIN/IRON/FA/VIT K/LUT (CENTRUM SILVER WOMEN ORAL)    Take 1 tablet by mouth Daily.    RESTASIS 0.05 % OPHTHALMIC EMULSION    Place 1 drop into both eyes 2 (two) times daily.    RESVERATROL 250 MG CAP    Take by mouth.    UBIDECARENONE (COENZYME Q10) 100 MG TAB    Take 150 mg by mouth.   Modified Medications    No medications on file   Discontinued Medications    No medications on file        Review of Systems   Constitutional: Positive for malaise/fatigue.   Cardiovascular:  Positive for dyspnea on exertion.   Respiratory: Negative.     Musculoskeletal: Negative.          Objective:   Vitals  Vitals:    07/09/24 1044   BP: 130/74   Pulse: 67   Resp: 18   SpO2: 98%   Weight: 72 kg (158 lb 11.7 oz)   Height: 5' (1.524 m)          Physical Exam  Cardiovascular:      Rate and Rhythm: Normal rate and regular rhythm.      Heart sounds: Murmur heard.   Pulmonary:      Effort: Pulmonary effort is normal.      Breath sounds: Normal breath sounds.   Musculoskeletal:         General: Normal range of motion.   Skin:     General: Skin is warm and dry.      Capillary Refill: Capillary refill takes less than 2 seconds.   Neurological:      Mental Status: She is alert and oriented to person, place, and time.   Psychiatric:         Mood and Affect: Mood normal.           Lab Results    Lab Results   Component Value Date    WBC 8.27 02/28/2024    HGB 14.2 02/28/2024    HCT 38.4 02/28/2024    MCV 77 (L) 02/28/2024       Lab Results   Component Value Date     02/28/2024       Lab Results   Component Value Date    K 4.6  "05/10/2024    BUN 12 05/10/2024    CREATININE 0.9 05/10/2024       Lab Results   Component Value Date     (H) 05/10/2024    HGBA1C 10.4 (H) 05/10/2024       Lab Results   Component Value Date    AST 21 05/10/2024    ALT 23 05/10/2024    ALBUMIN 4.1 05/10/2024    PROT 7.1 05/10/2024       Lab Results   Component Value Date    CHOL 304 (H) 05/10/2024    HDL 44 05/10/2024    LDLCALC 195.8 (H) 05/10/2024    TRIG 321 (H) 05/10/2024       No results found for: "CRP", "BNP"    Assessment:     Problem List Items Addressed This Visit          Cardiac/Vascular    Venous insufficiency - Primary    Mixed hyperlipidemia    EKG abnormality    At high risk for coronary artery disease    Cardiac murmur       Other    Adverse effect of HMG-CoA reductase inhibitor    Statin intolerance     Other Visit Diagnoses       Hypercholesterolemia with LDL greater than 190 mg/dL                Plan:     She was advised to initiate baby aspirin last visit, however she defers   She is unable to tolerate statin therapy and defers PCSK9 inhibitors today and opts for all natural management of cholesterol; handout and diet provided last visit.  She defers adding any medications today  She took a stress test and functional capacity was reduced at 6 minutes.  Her stress testing was also abnormal but not diagnostic.  I recommended a follow-up cardiac CTA, she deferred for abnormal EKG, REEVES, fatigue, elevated risk coronary artery disease, uncontrolled diabetes  Continue with current medical plan and lifestyle changes.    Update 7/10; patient agrees to CTA    Follow up in 6 months with repeat lipids  Return sooner for concerns or questions. If symptoms persist go to the ED    She expressed verbal understanding and agreed with the plan    Thank you for the opportunity to care for this patient. Will be available for questions if needed.     Total duration of face to face visit time 30 minutes.  Total time spent counseling greater than fifty percent " of total visit time.  Counseling included discussion regarding imaging findings, diagnosis, possibilities, treatment options, risks and benefits.    ASTER Kennedy-ABIMAEL  Cardiology Clinic  [facility]

## 2024-07-10 ENCOUNTER — TELEPHONE (OUTPATIENT)
Dept: ADMINISTRATIVE | Facility: OTHER | Age: 69
End: 2024-07-10
Payer: MEDICARE

## 2024-07-10 RX ORDER — METOPROLOL TARTRATE 25 MG/1
25 TABLET, FILM COATED ORAL
Qty: 3 TABLET | Refills: 0 | Status: SHIPPED | OUTPATIENT
Start: 2024-07-10 | End: 2025-07-10

## 2024-07-10 NOTE — TELEPHONE ENCOUNTER
----- Message from Lydia Garcia sent at 7/10/2024 11:15 AM CDT -----  Contact: [phone]  Good morning,    Pt called stating she had an appointment with Mer Murphy NP on yesterday.  She has decided that she is ready to get the CT Scan done on her heart.  Pt is requesting a call back, in hopes that an order can be placed in her chart for scheduling the CT scan.    Thank you,   Kelsea Stevens Navigator

## 2024-07-12 ENCOUNTER — TELEPHONE (OUTPATIENT)
Dept: CARDIOLOGY | Facility: HOSPITAL | Age: 69
End: 2024-07-12
Payer: MEDICARE

## 2024-07-12 ENCOUNTER — PATIENT MESSAGE (OUTPATIENT)
Dept: CARDIOLOGY | Facility: HOSPITAL | Age: 69
End: 2024-07-12
Payer: MEDICARE

## 2024-07-12 NOTE — TELEPHONE ENCOUNTER
I had the pleasure of discussing your upcoming Cardiac CTA scheduled for 07/17/2024 at 9:00. To review, we discussed showing up 15-20 minutes before your appointment time. Your test is located at [address] [address], [facility] on the 2nd Floor. You can access this via the parking garage between [facility] and [facility], utilizing the clinic tower entrances on any floor you are able to park on. You will know you are in the Medical Atrium whenever you see [employer], the food court, drug store, and often times a musician playing piano on the first floor. Please utilize the check in desk within the Lab and Imaging Services department.    I have reviewed your current medications that you take and I've discussed the Cardiac CTA prep for heart rate management with Dr. Andrea, the interpreting MD. He agrees with the orders given to by Mer Murphy NP, which are to take 25mg of Metoprolol (Lopressor) 1-hour prior to the CTA and bring the other 25mg tablets with you in the event more is needed.     Reminder for a 4-hour fasting time, no caffeine the AM of, and you can have water, anywhere from 16-32 ounces before and after completion of the test. It is advisable to have someone transport you to and from the test as a safety precaution. Thank you again for your time today. For any questions or concerns: I am available M-F 7:30-4, please call [phone].

## 2024-07-16 ENCOUNTER — LAB VISIT (OUTPATIENT)
Dept: LAB | Facility: HOSPITAL | Age: 69
End: 2024-07-16
Attending: NURSE PRACTITIONER
Payer: MEDICARE

## 2024-07-16 DIAGNOSIS — E11.65 TYPE 2 DIABETES MELLITUS WITH HYPERGLYCEMIA, WITH LONG-TERM CURRENT USE OF INSULIN: ICD-10-CM

## 2024-07-16 DIAGNOSIS — E78.00 HYPERCHOLESTEROLEMIA WITH LDL GREATER THAN 190 MG/DL: ICD-10-CM

## 2024-07-16 DIAGNOSIS — E78.2 MIXED HYPERLIPIDEMIA: ICD-10-CM

## 2024-07-16 DIAGNOSIS — Z79.4 TYPE 2 DIABETES MELLITUS WITH HYPERGLYCEMIA, WITH LONG-TERM CURRENT USE OF INSULIN: ICD-10-CM

## 2024-07-16 LAB
CHOLEST SERPL-MCNC: 300 MG/DL (ref 120–199)
CHOLEST/HDLC SERPL: 8.1 {RATIO} (ref 2–5)
CREAT SERPL-MCNC: 1 MG/DL (ref 0.5–1.4)
EST. GFR  (NO RACE VARIABLE): >60 ML/MIN/1.73 M^2
HDLC SERPL-MCNC: 37 MG/DL (ref 40–75)
HDLC SERPL: 12.3 % (ref 20–50)
LDLC SERPL CALC-MCNC: ABNORMAL MG/DL (ref 63–159)
NONHDLC SERPL-MCNC: 263 MG/DL
TRIGL SERPL-MCNC: 607 MG/DL (ref 30–150)

## 2024-07-16 PROCEDURE — 82565 ASSAY OF CREATININE: CPT | Performed by: NURSE PRACTITIONER

## 2024-07-16 PROCEDURE — 36415 COLL VENOUS BLD VENIPUNCTURE: CPT | Performed by: NURSE PRACTITIONER

## 2024-07-16 PROCEDURE — 80061 LIPID PANEL: CPT | Performed by: NURSE PRACTITIONER

## 2024-07-17 ENCOUNTER — HOSPITAL ENCOUNTER (OUTPATIENT)
Dept: RADIOLOGY | Facility: HOSPITAL | Age: 69
Discharge: HOME | End: 2024-07-17
Attending: NURSE PRACTITIONER
Payer: MEDICARE

## 2024-07-17 VITALS
SYSTOLIC BLOOD PRESSURE: 124 MMHG | HEART RATE: 60 BPM | OXYGEN SATURATION: 97 % | DIASTOLIC BLOOD PRESSURE: 68 MMHG | RESPIRATION RATE: 16 BRPM

## 2024-07-17 DIAGNOSIS — Z13.6 ENCOUNTER FOR SCREENING FOR CARDIOVASCULAR DISORDERS: ICD-10-CM

## 2024-07-17 PROCEDURE — 25500020 PHARM REV CODE 255: Performed by: NURSE PRACTITIONER

## 2024-07-17 PROCEDURE — 75574 CT ANGIO HRT W/3D IMAGE: CPT | Mod: 26,,, | Performed by: INTERNAL MEDICINE

## 2024-07-17 PROCEDURE — 25000242 PHARM REV CODE 250 ALT 637 W/ HCPCS: Performed by: NURSE PRACTITIONER

## 2024-07-17 PROCEDURE — 75574 CT ANGIO HRT W/3D IMAGE: CPT | Mod: TC

## 2024-07-17 RX ORDER — NITROGLYCERIN 0.4 MG/1
0.4 TABLET SUBLINGUAL ONCE
Status: COMPLETED | OUTPATIENT
Start: 2024-07-17 | End: 2024-07-17

## 2024-07-17 RX ADMIN — NITROGLYCERIN 0.4 MG: 0.4 TABLET, ORALLY DISINTEGRATING SUBLINGUAL at 08:07

## 2024-07-17 RX ADMIN — IOHEXOL 100 ML: 350 INJECTION, SOLUTION INTRAVENOUS at 09:07

## 2024-07-17 NOTE — NURSING
Patient arrived to radiology for scheduled cardiac CTA.  Patient given verbal information concerning the scan, need for PIV, and side effects of contrast and NTG.  Patient verbalized understanding of verbal information.  Patient placed on cardiac, BP, and pulse ox monitoring.  PIV 20g placed in RAC x one attempt.  Patient tolerated well.  Preparing for pre-calcium score portion of scan.

## 2024-07-17 NOTE — NURSING
Scan complete.  Patient tolerated well.  Patient denies HA or dizziness upon sitting or standing.  PIV D/C ed .  Jelco cath intact , no bleeding or hematoma noted.  Cobain dressing applied.  Patient given verbal and printed D/C instructions.  Patient instructed to drink two 16 oz bottles of water today to help flush the contrast from the body.  Patient instructed to remove cobain dressing in ten minutes.  Patient D/C ambulatory with [email or web address].

## 2024-07-17 NOTE — DISCHARGE INSTRUCTIONS
Today drink two 16 oz bottles of water to flush the contrast from the body by way of the kidneys.    Remove cobain dressing from right arm in ten minutes.

## 2024-07-25 ENCOUNTER — TELEPHONE (OUTPATIENT)
Dept: CARDIOLOGY | Facility: CLINIC | Age: 69
End: 2024-07-25
Payer: MEDICARE

## 2024-07-25 NOTE — TELEPHONE ENCOUNTER
----- Message from Mer Murphy NP sent at 7/25/2024  3:35 PM CDT -----  Patient contacted about abnormal CTA.  She had a left heart catheterization done approximately 20 years ago and reports abnormalities.  She would like for us to get her records.  Please follow-up with her on this.  I advise starting cholesterol manag  ement, however patient deferred at this time as she wishes not to medicate.  She is taking a baby aspirin.

## 2024-10-08 ENCOUNTER — HOSPITAL ENCOUNTER (INPATIENT)
Facility: HOSPITAL | Age: 69
LOS: 4 days | Discharge: ANOTHER HOSPITAL | DRG: 321 | End: 2024-10-13
Attending: INTERNAL MEDICINE
Payer: MEDICARE

## 2024-10-08 DIAGNOSIS — Z79.4 TYPE 2 DIABETES MELLITUS WITH DIABETIC POLYNEUROPATHY, WITH LONG-TERM CURRENT USE OF INSULIN: ICD-10-CM

## 2024-10-08 DIAGNOSIS — I46.9 CARDIAC ARREST: ICD-10-CM

## 2024-10-08 DIAGNOSIS — E78.2 MIXED HYPERLIPIDEMIA: ICD-10-CM

## 2024-10-08 DIAGNOSIS — I25.110 CORONARY ARTERY DISEASE INVOLVING NATIVE CORONARY ARTERY OF NATIVE HEART WITH UNSTABLE ANGINA PECTORIS: Primary | ICD-10-CM

## 2024-10-08 DIAGNOSIS — E11.42 TYPE 2 DIABETES MELLITUS WITH DIABETIC POLYNEUROPATHY, WITH LONG-TERM CURRENT USE OF INSULIN: ICD-10-CM

## 2024-10-08 DIAGNOSIS — I25.9 CHEST PAIN DUE TO MYOCARDIAL ISCHEMIA, UNSPECIFIED ISCHEMIC CHEST PAIN TYPE: ICD-10-CM

## 2024-10-08 DIAGNOSIS — R07.9 CHEST PAIN: ICD-10-CM

## 2024-10-08 PROBLEM — E66.3 OVERWEIGHT WITH BODY MASS INDEX (BMI) OF 26 TO 26.9 IN ADULT: Status: ACTIVE | Noted: 2024-10-08

## 2024-10-08 PROCEDURE — G0378 HOSPITAL OBSERVATION PER HR: HCPCS

## 2024-10-08 PROCEDURE — 25000003 PHARM REV CODE 250: Performed by: INTERNAL MEDICINE

## 2024-10-08 PROCEDURE — G0379 DIRECT REFER HOSPITAL OBSERV: HCPCS

## 2024-10-08 RX ORDER — POLYETHYLENE GLYCOL 3350 17 G/17G
17 POWDER, FOR SOLUTION ORAL DAILY
Status: DISCONTINUED | OUTPATIENT
Start: 2024-10-09 | End: 2024-10-13 | Stop reason: HOSPADM

## 2024-10-08 RX ORDER — METOPROLOL TARTRATE 25 MG/1
25 TABLET, FILM COATED ORAL 2 TIMES DAILY
Status: DISCONTINUED | OUTPATIENT
Start: 2024-10-08 | End: 2024-10-13 | Stop reason: HOSPADM

## 2024-10-08 RX ORDER — MORPHINE SULFATE 4 MG/ML
4 INJECTION, SOLUTION INTRAMUSCULAR; INTRAVENOUS EVERY 4 HOURS PRN
Status: DISCONTINUED | OUTPATIENT
Start: 2024-10-08 | End: 2024-10-09

## 2024-10-08 RX ORDER — HYDROCODONE BITARTRATE AND ACETAMINOPHEN 5; 325 MG/1; MG/1
1 TABLET ORAL EVERY 6 HOURS PRN
Status: DISCONTINUED | OUTPATIENT
Start: 2024-10-08 | End: 2024-10-09

## 2024-10-08 RX ORDER — SIMETHICONE 80 MG
1 TABLET,CHEWABLE ORAL 4 TIMES DAILY PRN
Status: DISCONTINUED | OUTPATIENT
Start: 2024-10-08 | End: 2024-10-13 | Stop reason: HOSPADM

## 2024-10-08 RX ORDER — NALOXONE HCL 0.4 MG/ML
0.02 VIAL (ML) INJECTION
Status: DISCONTINUED | OUTPATIENT
Start: 2024-10-08 | End: 2024-10-13 | Stop reason: HOSPADM

## 2024-10-08 RX ORDER — GLUCAGON 1 MG
1 KIT INJECTION
Status: DISCONTINUED | OUTPATIENT
Start: 2024-10-08 | End: 2024-10-10

## 2024-10-08 RX ORDER — ENOXAPARIN SODIUM 100 MG/ML
40 INJECTION SUBCUTANEOUS EVERY 24 HOURS
Status: DISCONTINUED | OUTPATIENT
Start: 2024-10-09 | End: 2024-10-08

## 2024-10-08 RX ORDER — NITROGLYCERIN 0.4 MG/1
0.4 TABLET SUBLINGUAL EVERY 5 MIN PRN
Status: DISCONTINUED | OUTPATIENT
Start: 2024-10-08 | End: 2024-10-13 | Stop reason: HOSPADM

## 2024-10-08 RX ORDER — ISOSORBIDE MONONITRATE 30 MG/1
30 TABLET, EXTENDED RELEASE ORAL DAILY
Status: DISCONTINUED | OUTPATIENT
Start: 2024-10-09 | End: 2024-10-09

## 2024-10-08 RX ORDER — PROCHLORPERAZINE EDISYLATE 5 MG/ML
5 INJECTION INTRAMUSCULAR; INTRAVENOUS EVERY 6 HOURS PRN
Status: DISCONTINUED | OUTPATIENT
Start: 2024-10-08 | End: 2024-10-13 | Stop reason: HOSPADM

## 2024-10-08 RX ORDER — AMOXICILLIN 250 MG
1 CAPSULE ORAL 2 TIMES DAILY PRN
Status: DISCONTINUED | OUTPATIENT
Start: 2024-10-08 | End: 2024-10-13 | Stop reason: HOSPADM

## 2024-10-08 RX ORDER — ONDANSETRON HYDROCHLORIDE 2 MG/ML
4 INJECTION, SOLUTION INTRAVENOUS EVERY 8 HOURS PRN
Status: DISCONTINUED | OUTPATIENT
Start: 2024-10-08 | End: 2024-10-11

## 2024-10-08 RX ORDER — SODIUM CHLORIDE 0.9 % (FLUSH) 0.9 %
10 SYRINGE (ML) INJECTION EVERY 12 HOURS PRN
Status: DISCONTINUED | OUTPATIENT
Start: 2024-10-08 | End: 2024-10-13 | Stop reason: HOSPADM

## 2024-10-08 RX ORDER — ALUMINUM HYDROXIDE, MAGNESIUM HYDROXIDE, AND SIMETHICONE 1200; 120; 1200 MG/30ML; MG/30ML; MG/30ML
30 SUSPENSION ORAL 4 TIMES DAILY PRN
Status: DISCONTINUED | OUTPATIENT
Start: 2024-10-08 | End: 2024-10-13 | Stop reason: HOSPADM

## 2024-10-08 RX ORDER — HEPARIN SODIUM 5000 [USP'U]/ML
5000 INJECTION, SOLUTION INTRAVENOUS; SUBCUTANEOUS EVERY 8 HOURS
Status: DISCONTINUED | OUTPATIENT
Start: 2024-10-09 | End: 2024-10-12

## 2024-10-08 RX ORDER — ASPIRIN 81 MG/1
81 TABLET ORAL EVERY MORNING
Status: DISCONTINUED | OUTPATIENT
Start: 2024-10-09 | End: 2024-10-13 | Stop reason: HOSPADM

## 2024-10-08 RX ORDER — TALC
6 POWDER (GRAM) TOPICAL NIGHTLY PRN
Status: DISCONTINUED | OUTPATIENT
Start: 2024-10-08 | End: 2024-10-13 | Stop reason: HOSPADM

## 2024-10-08 RX ORDER — ACETAMINOPHEN 325 MG/1
650 TABLET ORAL EVERY 4 HOURS PRN
Status: DISCONTINUED | OUTPATIENT
Start: 2024-10-08 | End: 2024-10-11

## 2024-10-08 RX ORDER — INSULIN ASPART 100 [IU]/ML
0-5 INJECTION, SOLUTION INTRAVENOUS; SUBCUTANEOUS EVERY 6 HOURS PRN
Status: DISCONTINUED | OUTPATIENT
Start: 2024-10-08 | End: 2024-10-10

## 2024-10-08 RX ADMIN — METOPROLOL TARTRATE 25 MG: 25 TABLET, FILM COATED ORAL at 08:10

## 2024-10-08 NOTE — Clinical Note
The catheter was inserted into the left ventricle. Hemodynamics were performed.  and Pullback was recorded.  The angiography was performed via power injection. The injected amount was 30 mL contrast at 15 mL/s.

## 2024-10-08 NOTE — Clinical Note
The catheter was repositioned into the ostium   left main. An angiography was performed of the left coronary arteries. Multiple views were taken. The angiography was performed via hand injection with 15 mL of contrast.

## 2024-10-08 NOTE — Clinical Note
The catheter was inserted into the ostium   left main. An angiography was performed of the left coronary arteries. Multiple views were taken. The angiography was performed via hand injection with 10 mL of contrast.

## 2024-10-08 NOTE — Clinical Note
Phone report was given to ASHLEY Bangura for . Pt. Is alert and oriented, but drowsy. Pt. Transported on continuous cardiac, BP and pulse oximetry monitors. NSR with HR 65 and oxygen 96% on RA. Transported to ICU with 2 RNs.

## 2024-10-08 NOTE — Clinical Note
----- Message from Jessica A Behrens,  sent at 12/20/2019 11:19 AM CST -----  Please call the patient regarding her results from yesterday.  Her blood type is A positive and her CBC is normal with no signs of anemia.  Her UA showed some blood which is likely vaginal in nature and it is being sent for culture to make sure there is no urinary tract infection.  She should go have her beta HCG repeated as soon as possible so that we can call her with the results before the end of today. Thanks, Briseyda   The site was marked. The groin was prepped. The site was prepped with Hibiclens. The site was clipped. The patient was draped.

## 2024-10-08 NOTE — Clinical Note
Procedural sedation maintain through anesthesia team. Check anesthesia case record for information concerning medications given, vital signs, and patient assessment.

## 2024-10-08 NOTE — Clinical Note
275 ml of contrast were injected throughout the case. 75 mL of contrast was the total wasted during the case. 350 mL was the total amount used during the case.

## 2024-10-09 DIAGNOSIS — Z91.041 CONTRAST MEDIA ALLERGY: Primary | ICD-10-CM

## 2024-10-09 PROBLEM — I25.110 CORONARY ARTERY DISEASE INVOLVING NATIVE CORONARY ARTERY OF NATIVE HEART WITH UNSTABLE ANGINA PECTORIS: Status: ACTIVE | Noted: 2024-10-09

## 2024-10-09 LAB
ALBUMIN SERPL BCP-MCNC: 3.9 G/DL (ref 3.5–5.2)
ALLENS TEST: ABNORMAL
ALP SERPL-CCNC: 58 U/L (ref 55–135)
ALT SERPL W/O P-5'-P-CCNC: 69 U/L (ref 10–44)
ANION GAP SERPL CALC-SCNC: 12 MMOL/L (ref 8–16)
ANION GAP SERPL CALC-SCNC: 12 MMOL/L (ref 8–16)
APTT PPP: 129 SEC (ref 21–32)
AST SERPL-CCNC: 75 U/L (ref 10–40)
BASOPHILS # BLD AUTO: 0.07 K/UL (ref 0–0.2)
BASOPHILS # BLD AUTO: 0.08 K/UL (ref 0–0.2)
BASOPHILS NFR BLD: 0.5 % (ref 0–1.9)
BASOPHILS NFR BLD: 0.8 % (ref 0–1.9)
BILIRUB SERPL-MCNC: 1.2 MG/DL (ref 0.1–1)
BNP SERPL-MCNC: 20 PG/ML (ref 0–99)
BUN SERPL-MCNC: 17 MG/DL (ref 8–23)
BUN SERPL-MCNC: 21 MG/DL (ref 8–23)
CALCIUM SERPL-MCNC: 8.9 MG/DL (ref 8.7–10.5)
CALCIUM SERPL-MCNC: 9.3 MG/DL (ref 8.7–10.5)
CHLORIDE SERPL-SCNC: 105 MMOL/L (ref 95–110)
CHLORIDE SERPL-SCNC: 108 MMOL/L (ref 95–110)
CO2 SERPL-SCNC: 17 MMOL/L (ref 23–29)
CO2 SERPL-SCNC: 19 MMOL/L (ref 23–29)
CREAT SERPL-MCNC: 0.8 MG/DL (ref 0.5–1.4)
CREAT SERPL-MCNC: 0.9 MG/DL (ref 0.5–1.4)
DELSYS: ABNORMAL
DIFFERENTIAL METHOD BLD: ABNORMAL
DIFFERENTIAL METHOD BLD: ABNORMAL
EOSINOPHIL # BLD AUTO: 0.3 K/UL (ref 0–0.5)
EOSINOPHIL # BLD AUTO: 0.4 K/UL (ref 0–0.5)
EOSINOPHIL NFR BLD: 1.7 % (ref 0–8)
EOSINOPHIL NFR BLD: 4.6 % (ref 0–8)
ERYTHROCYTE [DISTWIDTH] IN BLOOD BY AUTOMATED COUNT: 14.2 % (ref 11.5–14.5)
ERYTHROCYTE [DISTWIDTH] IN BLOOD BY AUTOMATED COUNT: 14.3 % (ref 11.5–14.5)
ERYTHROCYTE [SEDIMENTATION RATE] IN BLOOD BY WESTERGREN METHOD: 18 MM/H
EST. GFR  (NO RACE VARIABLE): >60 ML/MIN/1.73 M^2
EST. GFR  (NO RACE VARIABLE): >60 ML/MIN/1.73 M^2
FIO2: 80
GLUCOSE SERPL-MCNC: 174 MG/DL (ref 70–110)
GLUCOSE SERPL-MCNC: 195 MG/DL (ref 70–110)
HCO3 UR-SCNC: 18.1 MMOL/L (ref 24–28)
HCT VFR BLD AUTO: 39.2 % (ref 37–48.5)
HCT VFR BLD AUTO: 41.6 % (ref 37–48.5)
HGB BLD-MCNC: 14.3 G/DL (ref 12–16)
HGB BLD-MCNC: 15 G/DL (ref 12–16)
IMM GRANULOCYTES # BLD AUTO: 0.02 K/UL (ref 0–0.04)
IMM GRANULOCYTES # BLD AUTO: 0.07 K/UL (ref 0–0.04)
IMM GRANULOCYTES NFR BLD AUTO: 0.2 % (ref 0–0.5)
IMM GRANULOCYTES NFR BLD AUTO: 0.4 % (ref 0–0.5)
INR PPP: 1.1 (ref 0.8–1.2)
LYMPHOCYTES # BLD AUTO: 2.3 K/UL (ref 1–4.8)
LYMPHOCYTES # BLD AUTO: 3.1 K/UL (ref 1–4.8)
LYMPHOCYTES NFR BLD: 18.9 % (ref 18–48)
LYMPHOCYTES NFR BLD: 25.4 % (ref 18–48)
MAGNESIUM SERPL-MCNC: 1.7 MG/DL (ref 1.6–2.6)
MAGNESIUM SERPL-MCNC: 1.8 MG/DL (ref 1.6–2.6)
MCH RBC QN AUTO: 27.8 PG (ref 27–31)
MCH RBC QN AUTO: 28.4 PG (ref 27–31)
MCHC RBC AUTO-ENTMCNC: 36.1 G/DL (ref 32–36)
MCHC RBC AUTO-ENTMCNC: 36.5 G/DL (ref 32–36)
MCV RBC AUTO: 77 FL (ref 82–98)
MCV RBC AUTO: 78 FL (ref 82–98)
MODE: ABNORMAL
MONOCYTES # BLD AUTO: 0.7 K/UL (ref 0.3–1)
MONOCYTES # BLD AUTO: 0.8 K/UL (ref 0.3–1)
MONOCYTES NFR BLD: 4.3 % (ref 4–15)
MONOCYTES NFR BLD: 8.4 % (ref 4–15)
NEUTROPHILS # BLD AUTO: 12 K/UL (ref 1.8–7.7)
NEUTROPHILS # BLD AUTO: 5.6 K/UL (ref 1.8–7.7)
NEUTROPHILS NFR BLD: 60.6 % (ref 38–73)
NEUTROPHILS NFR BLD: 74.2 % (ref 38–73)
NRBC BLD-RTO: 0 /100 WBC
NRBC BLD-RTO: 0 /100 WBC
OHS QRS DURATION: 96 MS
OHS QTC CALCULATION: 445 MS
PCO2 BLDA: 39.2 MMHG (ref 35–45)
PEEP: 5
PH SMN: 7.27 [PH] (ref 7.35–7.45)
PHOSPHATE SERPL-MCNC: 3 MG/DL (ref 2.7–4.5)
PHOSPHATE SERPL-MCNC: 4.6 MG/DL (ref 2.7–4.5)
PIP: 19
PLATELET # BLD AUTO: 291 K/UL (ref 150–450)
PLATELET # BLD AUTO: 303 K/UL (ref 150–450)
PMV BLD AUTO: 9.4 FL (ref 9.2–12.9)
PMV BLD AUTO: 9.6 FL (ref 9.2–12.9)
PO2 BLDA: 209 MMHG (ref 80–100)
POC ACTIVATED CLOTTING TIME K: 269 SEC (ref 74–137)
POC BE: -9 MMOL/L
POC SATURATED O2: 100 % (ref 95–100)
POC TCO2: 19 MMOL/L (ref 23–27)
POCT GLUCOSE: 132 MG/DL (ref 70–110)
POCT GLUCOSE: 248 MG/DL (ref 70–110)
POTASSIUM SERPL-SCNC: 3.9 MMOL/L (ref 3.5–5.1)
POTASSIUM SERPL-SCNC: 4 MMOL/L (ref 3.5–5.1)
PROT SERPL-MCNC: 6.9 G/DL (ref 6–8.4)
PROTHROMBIN TIME: 11.5 SEC (ref 9–12.5)
RBC # BLD AUTO: 5.04 M/UL (ref 4–5.4)
RBC # BLD AUTO: 5.4 M/UL (ref 4–5.4)
SAMPLE: ABNORMAL
SAMPLE: ABNORMAL
SITE: ABNORMAL
SODIUM SERPL-SCNC: 136 MMOL/L (ref 136–145)
SODIUM SERPL-SCNC: 137 MMOL/L (ref 136–145)
SP02: 99
TROPONIN I SERPL DL<=0.01 NG/ML-MCNC: 0.14 NG/ML (ref 0–0.03)
VT: 400
WBC # BLD AUTO: 16.13 K/UL (ref 3.9–12.7)
WBC # BLD AUTO: 9.2 K/UL (ref 3.9–12.7)

## 2024-10-09 PROCEDURE — B215YZZ FLUOROSCOPY OF LEFT HEART USING OTHER CONTRAST: ICD-10-PCS | Performed by: STUDENT IN AN ORGANIZED HEALTH CARE EDUCATION/TRAINING PROGRAM

## 2024-10-09 PROCEDURE — 93010 ELECTROCARDIOGRAM REPORT: CPT | Mod: 76,,, | Performed by: INTERNAL MEDICINE

## 2024-10-09 PROCEDURE — 85730 THROMBOPLASTIN TIME PARTIAL: CPT

## 2024-10-09 PROCEDURE — 99152 MOD SED SAME PHYS/QHP 5/>YRS: CPT | Mod: ,,, | Performed by: STUDENT IN AN ORGANIZED HEALTH CARE EDUCATION/TRAINING PROGRAM

## 2024-10-09 PROCEDURE — 0BH17EZ INSERTION OF ENDOTRACHEAL AIRWAY INTO TRACHEA, VIA NATURAL OR ARTIFICIAL OPENING: ICD-10-PCS | Performed by: STUDENT IN AN ORGANIZED HEALTH CARE EDUCATION/TRAINING PROGRAM

## 2024-10-09 PROCEDURE — 99153 MOD SED SAME PHYS/QHP EA: CPT | Performed by: STUDENT IN AN ORGANIZED HEALTH CARE EDUCATION/TRAINING PROGRAM

## 2024-10-09 PROCEDURE — 63600175 PHARM REV CODE 636 W HCPCS

## 2024-10-09 PROCEDURE — 83735 ASSAY OF MAGNESIUM: CPT | Mod: 91

## 2024-10-09 PROCEDURE — 25000003 PHARM REV CODE 250: Performed by: STUDENT IN AN ORGANIZED HEALTH CARE EDUCATION/TRAINING PROGRAM

## 2024-10-09 PROCEDURE — 92978 ENDOLUMINL IVUS OCT C 1ST: CPT | Performed by: STUDENT IN AN ORGANIZED HEALTH CARE EDUCATION/TRAINING PROGRAM

## 2024-10-09 PROCEDURE — 92978 ENDOLUMINL IVUS OCT C 1ST: CPT | Mod: 26,,, | Performed by: STUDENT IN AN ORGANIZED HEALTH CARE EDUCATION/TRAINING PROGRAM

## 2024-10-09 PROCEDURE — 94761 N-INVAS EAR/PLS OXIMETRY MLT: CPT

## 2024-10-09 PROCEDURE — 25000003 PHARM REV CODE 250

## 2024-10-09 PROCEDURE — C1753 CATH, INTRAVAS ULTRASOUND: HCPCS | Performed by: STUDENT IN AN ORGANIZED HEALTH CARE EDUCATION/TRAINING PROGRAM

## 2024-10-09 PROCEDURE — 63600175 PHARM REV CODE 636 W HCPCS: Performed by: STUDENT IN AN ORGANIZED HEALTH CARE EDUCATION/TRAINING PROGRAM

## 2024-10-09 PROCEDURE — 93005 ELECTROCARDIOGRAM TRACING: CPT

## 2024-10-09 PROCEDURE — 84484 ASSAY OF TROPONIN QUANT: CPT

## 2024-10-09 PROCEDURE — 85025 COMPLETE CBC W/AUTO DIFF WBC: CPT | Performed by: INTERNAL MEDICINE

## 2024-10-09 PROCEDURE — 80048 BASIC METABOLIC PNL TOTAL CA: CPT | Mod: XB | Performed by: INTERNAL MEDICINE

## 2024-10-09 PROCEDURE — 85347 COAGULATION TIME ACTIVATED: CPT | Performed by: STUDENT IN AN ORGANIZED HEALTH CARE EDUCATION/TRAINING PROGRAM

## 2024-10-09 PROCEDURE — C1887 CATHETER, GUIDING: HCPCS | Performed by: STUDENT IN AN ORGANIZED HEALTH CARE EDUCATION/TRAINING PROGRAM

## 2024-10-09 PROCEDURE — 93010 ELECTROCARDIOGRAM REPORT: CPT | Mod: ,,, | Performed by: INTERNAL MEDICINE

## 2024-10-09 PROCEDURE — 36600 WITHDRAWAL OF ARTERIAL BLOOD: CPT

## 2024-10-09 PROCEDURE — 36415 COLL VENOUS BLD VENIPUNCTURE: CPT | Performed by: INTERNAL MEDICINE

## 2024-10-09 PROCEDURE — 83735 ASSAY OF MAGNESIUM: CPT | Performed by: INTERNAL MEDICINE

## 2024-10-09 PROCEDURE — B211YZZ FLUOROSCOPY OF MULTIPLE CORONARY ARTERIES USING OTHER CONTRAST: ICD-10-PCS | Performed by: STUDENT IN AN ORGANIZED HEALTH CARE EDUCATION/TRAINING PROGRAM

## 2024-10-09 PROCEDURE — 4A023N7 MEASUREMENT OF CARDIAC SAMPLING AND PRESSURE, LEFT HEART, PERCUTANEOUS APPROACH: ICD-10-PCS | Performed by: STUDENT IN AN ORGANIZED HEALTH CARE EDUCATION/TRAINING PROGRAM

## 2024-10-09 PROCEDURE — 85025 COMPLETE CBC W/AUTO DIFF WBC: CPT | Mod: 91

## 2024-10-09 PROCEDURE — C1751 CATH, INF, PER/CENT/MIDLINE: HCPCS

## 2024-10-09 PROCEDURE — 84100 ASSAY OF PHOSPHORUS: CPT | Performed by: INTERNAL MEDICINE

## 2024-10-09 PROCEDURE — 93458 L HRT ARTERY/VENTRICLE ANGIO: CPT | Performed by: STUDENT IN AN ORGANIZED HEALTH CARE EDUCATION/TRAINING PROGRAM

## 2024-10-09 PROCEDURE — B240ZZ3 ULTRASONOGRAPHY OF SINGLE CORONARY ARTERY, INTRAVASCULAR: ICD-10-PCS | Performed by: STUDENT IN AN ORGANIZED HEALTH CARE EDUCATION/TRAINING PROGRAM

## 2024-10-09 PROCEDURE — 84100 ASSAY OF PHOSPHORUS: CPT | Mod: 91

## 2024-10-09 PROCEDURE — 96372 THER/PROPH/DIAG INJ SC/IM: CPT | Performed by: INTERNAL MEDICINE

## 2024-10-09 PROCEDURE — 25000003 PHARM REV CODE 250: Performed by: INTERNAL MEDICINE

## 2024-10-09 PROCEDURE — 20000000 HC ICU ROOM

## 2024-10-09 PROCEDURE — 36410 VNPNXR 3YR/> PHY/QHP DX/THER: CPT

## 2024-10-09 PROCEDURE — 25500020 PHARM REV CODE 255: Performed by: STUDENT IN AN ORGANIZED HEALTH CARE EDUCATION/TRAINING PROGRAM

## 2024-10-09 PROCEDURE — 99223 1ST HOSP IP/OBS HIGH 75: CPT | Mod: 25,GC,, | Performed by: NURSE PRACTITIONER

## 2024-10-09 PROCEDURE — C1894 INTRO/SHEATH, NON-LASER: HCPCS | Performed by: STUDENT IN AN ORGANIZED HEALTH CARE EDUCATION/TRAINING PROGRAM

## 2024-10-09 PROCEDURE — 63600175 PHARM REV CODE 636 W HCPCS: Performed by: INTERNAL MEDICINE

## 2024-10-09 PROCEDURE — 5A2204Z RESTORATION OF CARDIAC RHYTHM, SINGLE: ICD-10-PCS | Performed by: STUDENT IN AN ORGANIZED HEALTH CARE EDUCATION/TRAINING PROGRAM

## 2024-10-09 PROCEDURE — 93458 L HRT ARTERY/VENTRICLE ANGIO: CPT | Mod: 26,,, | Performed by: STUDENT IN AN ORGANIZED HEALTH CARE EDUCATION/TRAINING PROGRAM

## 2024-10-09 PROCEDURE — 94003 VENT MGMT INPAT SUBQ DAY: CPT

## 2024-10-09 PROCEDURE — 83880 ASSAY OF NATRIURETIC PEPTIDE: CPT

## 2024-10-09 PROCEDURE — 85610 PROTHROMBIN TIME: CPT

## 2024-10-09 PROCEDURE — C1769 GUIDE WIRE: HCPCS | Performed by: STUDENT IN AN ORGANIZED HEALTH CARE EDUCATION/TRAINING PROGRAM

## 2024-10-09 PROCEDURE — 99152 MOD SED SAME PHYS/QHP 5/>YRS: CPT | Performed by: STUDENT IN AN ORGANIZED HEALTH CARE EDUCATION/TRAINING PROGRAM

## 2024-10-09 PROCEDURE — 99900035 HC TECH TIME PER 15 MIN (STAT)

## 2024-10-09 PROCEDURE — 5A1935Z RESPIRATORY VENTILATION, LESS THAN 24 CONSECUTIVE HOURS: ICD-10-PCS | Performed by: STUDENT IN AN ORGANIZED HEALTH CARE EDUCATION/TRAINING PROGRAM

## 2024-10-09 PROCEDURE — 80053 COMPREHEN METABOLIC PANEL: CPT

## 2024-10-09 PROCEDURE — 82803 BLOOD GASES ANY COMBINATION: CPT

## 2024-10-09 RX ORDER — FENTANYL CITRATE 50 UG/ML
50 INJECTION, SOLUTION INTRAMUSCULAR; INTRAVENOUS
Status: DISCONTINUED | OUTPATIENT
Start: 2024-10-09 | End: 2024-10-09

## 2024-10-09 RX ORDER — PHENYLEPHRINE HCL IN 0.9% NACL 1 MG/10 ML
SYRINGE (ML) INTRAVENOUS
Status: DISPENSED
Start: 2024-10-09 | End: 2024-10-10

## 2024-10-09 RX ORDER — MAGNESIUM SULFATE HEPTAHYDRATE 40 MG/ML
2 INJECTION, SOLUTION INTRAVENOUS ONCE
Status: COMPLETED | OUTPATIENT
Start: 2024-10-09 | End: 2024-10-09

## 2024-10-09 RX ORDER — SUCCINYLCHOLINE CHLORIDE 20 MG/ML
INJECTION INTRAMUSCULAR; INTRAVENOUS
Status: DISCONTINUED | OUTPATIENT
Start: 2024-10-09 | End: 2024-10-09

## 2024-10-09 RX ORDER — METHYLPREDNISOLONE SOD SUCC 125 MG
VIAL (EA) INJECTION
Status: DISCONTINUED | OUTPATIENT
Start: 2024-10-09 | End: 2024-10-09 | Stop reason: HOSPADM

## 2024-10-09 RX ORDER — FENTANYL CITRATE-0.9 % NACL/PF 10 MCG/ML
0-250 PLASTIC BAG, INJECTION (ML) INTRAVENOUS CONTINUOUS
Status: DISCONTINUED | OUTPATIENT
Start: 2024-10-09 | End: 2024-10-09

## 2024-10-09 RX ORDER — MIDAZOLAM HYDROCHLORIDE 1 MG/ML
INJECTION INTRAMUSCULAR; INTRAVENOUS
Status: DISCONTINUED | OUTPATIENT
Start: 2024-10-09 | End: 2024-10-09 | Stop reason: HOSPADM

## 2024-10-09 RX ORDER — FENTANYL CITRATE 50 UG/ML
INJECTION, SOLUTION INTRAMUSCULAR; INTRAVENOUS
Status: DISCONTINUED | OUTPATIENT
Start: 2024-10-09 | End: 2024-10-09

## 2024-10-09 RX ORDER — IODIXANOL 320 MG/ML
INJECTION, SOLUTION INTRAVASCULAR
Status: DISCONTINUED | OUTPATIENT
Start: 2024-10-09 | End: 2024-10-09 | Stop reason: HOSPADM

## 2024-10-09 RX ORDER — NOREPINEPHRINE BITARTRATE/D5W 4MG/250ML
0-3 PLASTIC BAG, INJECTION (ML) INTRAVENOUS CONTINUOUS
Status: DISCONTINUED | OUTPATIENT
Start: 2024-10-09 | End: 2024-10-10

## 2024-10-09 RX ORDER — PHENYLEPHRINE HCL IN 0.9% NACL 1 MG/10 ML
SYRINGE (ML) INTRAVENOUS
Status: DISCONTINUED | OUTPATIENT
Start: 2024-10-09 | End: 2024-10-09 | Stop reason: HOSPADM

## 2024-10-09 RX ORDER — EPINEPHRINE 1 MG/ML
INJECTION INTRAMUSCULAR; INTRAVENOUS; SUBCUTANEOUS
Status: DISCONTINUED | OUTPATIENT
Start: 2024-10-09 | End: 2024-10-09 | Stop reason: HOSPADM

## 2024-10-09 RX ORDER — VERAPAMIL HYDROCHLORIDE 2.5 MG/ML
INJECTION, SOLUTION INTRAVENOUS
Status: DISCONTINUED | OUTPATIENT
Start: 2024-10-09 | End: 2024-10-09 | Stop reason: HOSPADM

## 2024-10-09 RX ORDER — MIDAZOLAM HYDROCHLORIDE 1 MG/ML
INJECTION, SOLUTION INTRAMUSCULAR; INTRAVENOUS
Status: DISCONTINUED | OUTPATIENT
Start: 2024-10-09 | End: 2024-10-09

## 2024-10-09 RX ORDER — PROPOFOL 10 MG/ML
0-50 INJECTION, EMULSION INTRAVENOUS CONTINUOUS
Status: DISCONTINUED | OUTPATIENT
Start: 2024-10-09 | End: 2024-10-09

## 2024-10-09 RX ORDER — ETOMIDATE 2 MG/ML
INJECTION INTRAVENOUS
Status: DISCONTINUED | OUTPATIENT
Start: 2024-10-09 | End: 2024-10-09

## 2024-10-09 RX ORDER — LIDOCAINE HYDROCHLORIDE 10 MG/ML
INJECTION, SOLUTION INFILTRATION; PERINEURAL
Status: DISCONTINUED | OUTPATIENT
Start: 2024-10-09 | End: 2024-10-09 | Stop reason: HOSPADM

## 2024-10-09 RX ORDER — DIPHENHYDRAMINE HYDROCHLORIDE 50 MG/ML
INJECTION INTRAMUSCULAR; INTRAVENOUS
Status: DISCONTINUED | OUTPATIENT
Start: 2024-10-09 | End: 2024-10-09 | Stop reason: HOSPADM

## 2024-10-09 RX ORDER — HEPARIN SODIUM 1000 [USP'U]/ML
INJECTION, SOLUTION INTRAVENOUS; SUBCUTANEOUS
Status: DISCONTINUED | OUTPATIENT
Start: 2024-10-09 | End: 2024-10-09 | Stop reason: HOSPADM

## 2024-10-09 RX ORDER — HEPARIN SODIUM 200 [USP'U]/100ML
INJECTION, SOLUTION INTRAVENOUS
Status: DISCONTINUED | OUTPATIENT
Start: 2024-10-09 | End: 2024-10-09

## 2024-10-09 RX ORDER — NOREPINEPHRINE BITARTRATE 1 MG/ML
INJECTION, SOLUTION INTRAVENOUS
Status: DISCONTINUED | OUTPATIENT
Start: 2024-10-09 | End: 2024-10-09

## 2024-10-09 RX ORDER — PROPOFOL 10 MG/ML
INJECTION, EMULSION INTRAVENOUS
Status: DISCONTINUED | OUTPATIENT
Start: 2024-10-09 | End: 2024-10-09

## 2024-10-09 RX ORDER — ISOSORBIDE MONONITRATE 30 MG/1
30 TABLET, EXTENDED RELEASE ORAL DAILY
Status: DISCONTINUED | OUTPATIENT
Start: 2024-10-10 | End: 2024-10-13 | Stop reason: HOSPADM

## 2024-10-09 RX ORDER — FENTANYL CITRATE 50 UG/ML
INJECTION, SOLUTION INTRAMUSCULAR; INTRAVENOUS
Status: DISCONTINUED | OUTPATIENT
Start: 2024-10-09 | End: 2024-10-09 | Stop reason: HOSPADM

## 2024-10-09 RX ADMIN — THERA TABS 1 TABLET: TAB at 09:10

## 2024-10-09 RX ADMIN — METOPROLOL TARTRATE 25 MG: 25 TABLET, FILM COATED ORAL at 09:10

## 2024-10-09 RX ADMIN — HYPROMELLOSE 2910 1 DROP: 5 SOLUTION OPHTHALMIC at 10:10

## 2024-10-09 RX ADMIN — ISOSORBIDE MONONITRATE 30 MG: 30 TABLET, EXTENDED RELEASE ORAL at 09:10

## 2024-10-09 RX ADMIN — FENTANYL CITRATE 50 MCG: 50 INJECTION INTRAMUSCULAR; INTRAVENOUS at 05:10

## 2024-10-09 RX ADMIN — Medication 0.02 MCG/KG/MIN: at 04:10

## 2024-10-09 RX ADMIN — POLYETHYLENE GLYCOL 3350 17 G: 17 POWDER, FOR SOLUTION ORAL at 09:10

## 2024-10-09 RX ADMIN — MAGNESIUM SULFATE HEPTAHYDRATE 2 G: 40 INJECTION, SOLUTION INTRAVENOUS at 09:10

## 2024-10-09 RX ADMIN — HEPARIN SODIUM 5000 UNITS: 5000 INJECTION INTRAVENOUS; SUBCUTANEOUS at 06:10

## 2024-10-09 RX ADMIN — ASPIRIN 81 MG: 81 TABLET, COATED ORAL at 06:10

## 2024-10-09 RX ADMIN — ONDANSETRON 4 MG: 2 INJECTION INTRAMUSCULAR; INTRAVENOUS at 06:10

## 2024-10-09 NOTE — SUBJECTIVE & OBJECTIVE
Interval History:  Patient was seen in the morning so complaining of chest pain patient is scheduled for [facility] today cardiology is on board.    Review of Systems   Constitutional:  Negative for activity change, chills and fever.   HENT:  Negative for congestion, rhinorrhea and sore throat.    Eyes:  Negative for discharge and redness.   Respiratory:  Negative for cough, chest tightness, shortness of breath and wheezing.    Cardiovascular:  Positive for chest pain. Negative for palpitations and leg swelling.   Gastrointestinal:  Negative for abdominal pain, constipation, diarrhea, nausea and vomiting.   Genitourinary:  Negative for dysuria, flank pain and hematuria.   Musculoskeletal:  Negative for back pain, myalgias and neck pain.        Pain in upper arms, bilateral shoulders, between shoulder blades and armpit   Skin:  Negative for pallor, rash and wound.   Neurological:  Negative for dizziness, tremors, syncope, weakness, numbness and headaches.   Psychiatric/Behavioral:  Negative for agitation, confusion and hallucinations.      Objective:     Vital Signs (Most Recent):  Temp: 98.4 °F (36.9 °C) (10/09/24 1139)  Pulse: 65 (10/09/24 1200)  Resp: 20 (10/09/24 1139)  BP: (!) 110/59 (10/09/24 1139)  SpO2: 96 % (10/09/24 1139) Vital Signs (24h Range):  Temp:  [97.5 °F (36.4 °C)-98.4 °F (36.9 °C)] 98.4 °F (36.9 °C)  Pulse:  [60-70] 65  Resp:  [16-20] 20  SpO2:  [95 %-96 %] 96 %  BP: (110-147)/(59-78) 110/59     Weight: 72.6 kg (160 lb 0.9 oz)  Body mass index is 26.63 kg/m².  No intake or output data in the 24 hours ending 10/09/24 1357      Physical Exam  Vitals and nursing note reviewed.   Constitutional:       General: She is not in acute distress.  HENT:      Head: Normocephalic and atraumatic.      Mouth/Throat:      Mouth: Mucous membranes are moist.   Eyes:      General:         Right eye: No discharge.         Left eye: No discharge.      Conjunctiva/sclera: Conjunctivae normal.   Cardiovascular:      Rate and  Rhythm: Normal rate and regular rhythm.      Pulses: Normal pulses.   Pulmonary:      Effort: Pulmonary effort is normal.      Breath sounds: Normal breath sounds.   Abdominal:      General: Bowel sounds are normal.      Palpations: Abdomen is soft.   Musculoskeletal:         General: No swelling. Normal range of motion.      Cervical back: Normal range of motion and neck supple.   Skin:     General: Skin is warm and dry.   Neurological:      Mental Status: She is alert and oriented to person, place, and time. Mental status is at baseline.   Psychiatric:         Mood and Affect: Mood normal.             Significant Labs: All pertinent labs within the past 24 hours have been reviewed.  Recent Lab Results         10/09/24  0546   10/09/24  0043   10/08/24  1556        Anion Gap 12           Baso # 0.07           Basophil % 0.8           BUN 21           Calcium 9.3           Chloride 108           CO2 17           Creatinine 0.8           Differential Method Automated           eGFR >60           Eos # 0.4           Eos % 4.6           Glucose 174           Gran # (ANC) 5.6           Gran % 60.6           Hematocrit 41.6           Hemoglobin 15.0           Immature Grans (Abs) 0.02  Comment: Mild elevation in immature granulocytes is non specific and   can be seen in a variety of conditions including stress response,   acute inflammation, trauma and pregnancy. Correlation with other   laboratory and clinical findings is essential.             Immature Granulocytes 0.2           Lymph # 2.3           Lymph % 25.4           Magnesium  1.8           MCH 27.8           MCHC 36.1           MCV 77           Mono # 0.8           Mono % 8.4           MPV 9.6           nRBC 0           Phosphorus Level 3.0           Platelet Count 303           POCT Glucose   132   87       Potassium 4.0           RBC 5.40           RDW 14.3           Sodium 137           WBC 9.20                   Significant Imaging: I have reviewed all  pertinent imaging results/findings within the past 24 hours.

## 2024-10-09 NOTE — HPI
"The patient was a 69-year-old female with a past medical history of diabetes, hyperlipidemia, CAD, GERD and cataracts.  The patient was transferred from [place] for cardiac evaluation.  She presented to the hospital due to retrosternal chest pain that had lasted 2 days.  The patient was describes the pain as feeling like "a heavy brick is sitting on my chest".  She states the pain started on Sunday and was between a 3-4/10.  She described it as radiating to her bilateral upper arms, shoulders, armpit, behind her ears and between her shoulder blades.  She stated by today, the pain was 8/10.  She had a similar episode in July and had a CT angiogram done. Results showed: CAD-RADS 2. Diffuse atherosclerosis with long segment in mid LAD of 50-70% stenosis. Coronary calcium score 89.  She was continued on baby aspirin.  At the time she declined medical interventional therapy.  The patient had a left heart catheterization done about 20 years ago which was negative.  Due to her current symptoms she was transferred to [facility] for evaluation and angiogram by cardiology team.    On arrival, the patient was vitals has been stable.  Troponin is within normal limits but lipid panel elevated. EKG showed NSR rate 69, normal EKG, QTc 435 ms.  Patient was to be admitted to telemetry for further evaluation by Cardiology.  "

## 2024-10-09 NOTE — ASSESSMENT & PLAN NOTE
- seen previously by ABIMAEL RODARTE in [place]  - previous cardiac CTA with mid LAD 50-70%  - needs further evaluation with [facility]; further recommendations after [facility]

## 2024-10-09 NOTE — ASSESSMENT & PLAN NOTE
Patient with known CAD with no intervention based on patient's wishes, which is uncontrolled Will continue ASA, Plavix, and Statin and monitor for S/Sx of angina/ACS. Continue to monitor on telemetry.

## 2024-10-09 NOTE — SUBJECTIVE & OBJECTIVE
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     ST elevation (STEMI) myocardial infarction of unspecified site        Past Surgical History:   Procedure Laterality Date    DILATION AND CURETTAGE OF UTERUS      ESOPHAGOGASTRODUODENOSCOPY      ESOPHAGOGASTRODUODENOSCOPY N/A 11/22/2023    Procedure: EGD (ESOPHAGOGASTRODUODENOSCOPY);  Surgeon: Brigitte Zeng MD;  Location: [facility];  Service: Endoscopy;  Laterality: N/A;    EYE SURGERY Bilateral     Cataract Surgery    HYSTERECTOMY      VAGINAL DELIVERY      x2       Review of patient's allergies indicates:   Allergen Reactions    Statins-hmg-coa reductase inhibitors Anaphylaxis and Rash     Swelling / Pancreatitis      Adhesive Itching     To [employer]ara brand HRT patch and generic only.     Fenofibrate      Fingers turn purple     Metformin Other (See Comments)     Myalgia /GI UPSET      Omeprazole Other (See Comments)     Patient claims she experienced hair loss, finger nails falling off, rash, and edema due to medication.     Pamabrom      INTOLERANCE/ SEVERE DEHYDRATION : SIDE EFFECTS    Sulfa (sulfonamide antibiotics) Rash       Current Facility-Administered Medications on File Prior to Encounter   Medication    [DISCONTINUED] acetaminophen tablet 650 mg    [DISCONTINUED] aluminum-magnesium hydroxide-simethicone 200-200-20 mg/5 mL suspension 30 mL    [DISCONTINUED] aspirin EC tablet 81 mg    [DISCONTINUED] dextrose 10% bolus 125 mL 125 mL    [DISCONTINUED] dextrose 10% bolus 250 mL 250 mL    [DISCONTINUED] enoxaparin injection 40 mg    [DISCONTINUED] glucagon (human recombinant) injection 1 mg    [DISCONTINUED] HYDROcodone-acetaminophen 5-325 mg per tablet 1 tablet    [DISCONTINUED] insulin aspart U-100 pen 0-5 Units    [DISCONTINUED] isosorbide mononitrate 24 hr tablet 30 mg    [DISCONTINUED] melatonin tablet 6 mg    [DISCONTINUED] metoprolol tartrate (LOPRESSOR) tablet  25 mg    [DISCONTINUED] morphine injection 4 mg    [DISCONTINUED] multivitamin tablet    [DISCONTINUED] naloxone 0.4 mg/mL injection 0.02 mg    [DISCONTINUED] nitroGLYCERIN SL tablet 0.4 mg    [DISCONTINUED] ondansetron injection 4 mg    [DISCONTINUED] polyethylene glycol packet 17 g    [DISCONTINUED] prochlorperazine injection Soln 5 mg    [DISCONTINUED] senna-docusate 8.6-50 mg per tablet 1 tablet    [DISCONTINUED] simethicone chewable tablet 80 mg    [DISCONTINUED] sodium chloride 0.9% flush 10 mL     Current Outpatient Medications on File Prior to Encounter   Medication Sig    aspirin (ECOTRIN) 81 MG EC tablet Take 81 mg by mouth every morning.    BASAGLAR KWIKPEN U-100 INSULIN 100 unit/mL (3 mL) InPn pen Inject 34 Units into the skin once daily. (Patient taking differently: Inject 34 Units into the skin every evening.)    multivit-min/iron/FA/vit K/lut (CENTRUM SILVER WOMEN ORAL) Take 1 tablet by mouth Daily.    RESTASIS 0.05 % ophthalmic emulsion Place 1 drop into both eyes 2 (two) times daily.    blood-glucose sensor (FREESTYLE ASMITA 3 SENSOR) Vicenta by Misc.(Non-Drug; Combo Route) route.    metoprolol tartrate (LOPRESSOR) 25 MG tablet Take 1 tablet (25 mg total) by mouth On call Procedure (Please take 1 tab prior to cardiac CTA and bring the other 2 tabs with you.). (Patient not taking: Reported on 10/7/2024)    resveratroL 250 mg Cap Take by mouth. (Patient not taking: Reported on 6/6/2024)     Family History       Problem Relation (Age of Onset)    Colon cancer Other    No Known Problems Mother, Father          Tobacco Use    Smoking status: Never    Smokeless tobacco: Never   Substance and Sexual Activity    Alcohol use: Never    Drug use: Yes     Types: Other-see comments     Comment: 5CBD-gummies for sleep    Sexual activity: Not Currently     Review of Systems   Constitutional:  Negative for activity change, chills and fever.   HENT:  Negative for congestion, rhinorrhea and sore throat.    Eyes:   Negative for discharge and redness.   Respiratory:  Negative for cough, chest tightness, shortness of breath and wheezing.    Cardiovascular:  Positive for chest pain. Negative for palpitations and leg swelling.   Gastrointestinal:  Negative for abdominal pain, constipation, diarrhea, nausea and vomiting.   Genitourinary:  Negative for dysuria, flank pain and hematuria.   Musculoskeletal:  Negative for back pain, myalgias and neck pain.        Pain in upper arms, bilateral shoulders, between shoulder blades and armpit   Skin:  Negative for pallor, rash and wound.   Neurological:  Negative for dizziness, tremors, syncope, weakness, numbness and headaches.   Psychiatric/Behavioral:  Negative for agitation, confusion and hallucinations.      Objective:     Vital Signs (Most Recent):  BP: 130/77 (10/08/24 2009) Vital Signs (24h Range):  Temp:  [97.9 °F (36.6 °C)-98.1 °F (36.7 °C)] 97.9 °F (36.6 °C)  Pulse:  [49-88] 70  Resp:  [14-20] 18  SpO2:  [93 %-96 %] 96 %  BP: (113-140)/(55-77) 130/77     Weight: 72.6 kg (160 lb 0.9 oz)  Body mass index is 26.63 kg/m².     Physical Exam  Vitals and nursing note reviewed.   Constitutional:       General: She is not in acute distress.  HENT:      Head: Normocephalic and atraumatic.      Mouth/Throat:      Mouth: Mucous membranes are moist.   Eyes:      General:         Right eye: No discharge.         Left eye: No discharge.      Conjunctiva/sclera: Conjunctivae normal.   Cardiovascular:      Rate and Rhythm: Normal rate and regular rhythm.      Pulses: Normal pulses.   Pulmonary:      Effort: Pulmonary effort is normal.      Breath sounds: Normal breath sounds.   Abdominal:      General: Bowel sounds are normal.      Palpations: Abdomen is soft.   Musculoskeletal:         General: No swelling. Normal range of motion.      Cervical back: Normal range of motion and neck supple.   Skin:     General: Skin is warm and dry.   Neurological:      Mental Status: She is alert and oriented to  person, place, and time. Mental status is at baseline.   Psychiatric:         Mood and Affect: Mood normal.                Significant Labs: All pertinent labs within the past 24 hours have been reviewed.    Significant Imaging: I have reviewed all pertinent imaging results/findings within the past 24 hours.

## 2024-10-09 NOTE — HOSPITAL COURSE
10/9/2024 per HPI   [id number] [facility] yesterday with following results:   Left Main Coronary Artery: The left main is a large and short caliber vessel arising normally from the left sinus of valsalva.  It bifurcates into the left anterior descending and left circumflex coronary artery.  It is free of evidence of obstructive coronary artery disease. JUANITA III flow     Left Anterior Descending: The left anterior descending coronary artery is a large caliber vessel arising normally from the left main and extending to the apex.  It gives rise to  caliber diagonal arteries. Mid LAD with 50-60% stenosis, D3 with proximal 99% stenosis.       Left Circumflex: The left circumflex is a large caliber vessel arising normally from the left main coronary artery, it is co-dominant.  It gives rise to moderate caliber obtuse marginal branches. OM1 with 20-30% stenosis.  The circumflex system is free of obstructive coronary artery disease. JUANITA III flow     Right Coronary Artery: The right coronary artery is a large caliber vessel arising normally from the right sinus of valsalva.  It is co-dominant giving rise to a PDA and PLV branch.  Mid RCA with 30-40% stenosis. JUANITA III flow     LVgram: LVEDP 8, 14 mmHg     Intervention:   Runthrough advanced to distal LAD. After we advanced IVUS to LM and LAD without evidence of dissection, plaque rupture or damaged. Decided to stop and cool down the patient given event described below.     Patient after taking diagnostic angiogram images and while I was trying to engaged into LM, patient rapidly decompensated and became SOB, ECG changes from SARAY to PEA arrest to VT. Received epi x 2, IV amio rosc achieved. Intubated in cath lab. Transferred to ICU on levo gtt is stable condition with resolution of ST changes. After long discussion with her son- he did mentioned she has multiple allergies and usually is prone to have bad allergic reaction. Unclear etiology of cardiac arrest.        Assessment:   Mid  LAD 50-60% stenosis   Diagonal 3 with 95-99% stenosis angiographically  Cardiac Arrest unknown etiology- case stopped once patient was HD stable     Plan:   - Continue current medication regimen  - EKG when arrives to floor  - Routine post cath protocol  - Intubated/sedated - PCCM following  - IVF at  100cc/hr  for 3 hours  -Will stage iFR of mLAD and PCI of D3 of she is HD stable     Admitted to ICU post procedure. Extubated yesterday. HR and BP stable overnight. CBC and BMP WNL. Repeat troponin this AM 0.808. Echo post procedure with EF 50-55%

## 2024-10-09 NOTE — PROGRESS NOTES
"[facility] Medicine  Progress Note    Patient Name: Terra Mcpherson  MRN: [id number]  Patient Class: IP- Inpatient   Admission Date: 10/8/2024  Length of Stay: 0 days  Attending Physician: Kristan Santana MD  Primary Care Provider: Pola Machado MD        Subjective:     Principal Problem:Chest pain        HPI:  The patient was a 69-year-old female with a past medical history of diabetes, hyperlipidemia, CAD, GERD and cataracts.  The patient was transferred from [place] for cardiac evaluation.  She presented to the hospital due to retrosternal chest pain that had lasted 2 days.  The patient was describes the pain as feeling like "a heavy brick is sitting on my chest".  She states the pain started on Sunday and was between a 3-4/10.  She described it as radiating to her bilateral upper arms, shoulders, armpit, behind her ears and between her shoulder blades.  She stated by today, the pain was 8/10.  She had a similar episode in July and had a CT angiogram done. Results showed: CAD-RADS 2. Diffuse atherosclerosis with long segment in mid LAD of 50-70% stenosis. Coronary calcium score 89.  She was continued on baby aspirin.  At the time she declined medical interventional therapy.  The patient had a left heart catheterization done about 20 years ago which was negative.  Due to her current symptoms she was transferred to [facility] for evaluation and angiogram by cardiology team.    On arrival, the patient was vitals has been stable.  Troponin is within normal limits but lipid panel elevated. EKG showed NSR rate 69, normal EKG, QTc 435 ms.  Patient was to be admitted to telemetry for further evaluation by Cardiology.    Overview/Hospital Course:  No notes on file    Interval History:  Patient was seen in the morning so complaining of chest pain patient is scheduled for [facility] today cardiology is on board.    Review of Systems   Constitutional:  Negative for activity change, chills and " fever.   HENT:  Negative for congestion, rhinorrhea and sore throat.    Eyes:  Negative for discharge and redness.   Respiratory:  Negative for cough, chest tightness, shortness of breath and wheezing.    Cardiovascular:  Positive for chest pain. Negative for palpitations and leg swelling.   Gastrointestinal:  Negative for abdominal pain, constipation, diarrhea, nausea and vomiting.   Genitourinary:  Negative for dysuria, flank pain and hematuria.   Musculoskeletal:  Negative for back pain, myalgias and neck pain.        Pain in upper arms, bilateral shoulders, between shoulder blades and armpit   Skin:  Negative for pallor, rash and wound.   Neurological:  Negative for dizziness, tremors, syncope, weakness, numbness and headaches.   Psychiatric/Behavioral:  Negative for agitation, confusion and hallucinations.      Objective:     Vital Signs (Most Recent):  Temp: 98.4 °F (36.9 °C) (10/09/24 1139)  Pulse: 65 (10/09/24 1200)  Resp: 20 (10/09/24 1139)  BP: (!) 110/59 (10/09/24 1139)  SpO2: 96 % (10/09/24 1139) Vital Signs (24h Range):  Temp:  [97.5 °F (36.4 °C)-98.4 °F (36.9 °C)] 98.4 °F (36.9 °C)  Pulse:  [60-70] 65  Resp:  [16-20] 20  SpO2:  [95 %-96 %] 96 %  BP: (110-147)/(59-78) 110/59     Weight: 72.6 kg (160 lb 0.9 oz)  Body mass index is 26.63 kg/m².  No intake or output data in the 24 hours ending 10/09/24 1357      Physical Exam  Vitals and nursing note reviewed.   Constitutional:       General: She is not in acute distress.  HENT:      Head: Normocephalic and atraumatic.      Mouth/Throat:      Mouth: Mucous membranes are moist.   Eyes:      General:         Right eye: No discharge.         Left eye: No discharge.      Conjunctiva/sclera: Conjunctivae normal.   Cardiovascular:      Rate and Rhythm: Normal rate and regular rhythm.      Pulses: Normal pulses.   Pulmonary:      Effort: Pulmonary effort is normal.      Breath sounds: Normal breath sounds.   Abdominal:      General: Bowel sounds are normal.       Palpations: Abdomen is soft.   Musculoskeletal:         General: No swelling. Normal range of motion.      Cervical back: Normal range of motion and neck supple.   Skin:     General: Skin is warm and dry.   Neurological:      Mental Status: She is alert and oriented to person, place, and time. Mental status is at baseline.   Psychiatric:         Mood and Affect: Mood normal.             Significant Labs: All pertinent labs within the past 24 hours have been reviewed.  Recent Lab Results         10/09/24  0546   10/09/24  0043   10/08/24  1556        Anion Gap 12           Baso # 0.07           Basophil % 0.8           BUN 21           Calcium 9.3           Chloride 108           CO2 17           Creatinine 0.8           Differential Method Automated           eGFR >60           Eos # 0.4           Eos % 4.6           Glucose 174           Gran # (ANC) 5.6           Gran % 60.6           Hematocrit 41.6           Hemoglobin 15.0           Immature Grans (Abs) 0.02  Comment: Mild elevation in immature granulocytes is non specific and   can be seen in a variety of conditions including stress response,   acute inflammation, trauma and pregnancy. Correlation with other   laboratory and clinical findings is essential.             Immature Granulocytes 0.2           Lymph # 2.3           Lymph % 25.4           Magnesium  1.8           MCH 27.8           MCHC 36.1           MCV 77           Mono # 0.8           Mono % 8.4           MPV 9.6           nRBC 0           Phosphorus Level 3.0           Platelet Count 303           POCT Glucose   132   87       Potassium 4.0           RBC 5.40           RDW 14.3           Sodium 137           WBC 9.20                   Significant Imaging: I have reviewed all pertinent imaging results/findings within the past 24 hours.    Assessment/Plan:      * Chest pain  -patient complained of feeling like there is a heavy brick on her chest  -troponins negative  -cardiac monitor  -7/14/2024 CT  angiogram of the coronary arteries demonstrated Diffuse atherosclerosis with long segment in mid LAD of 50-70% stenosis.Coronary calcium score 89.   -continue with aspirin, beta-blocker and isosorbide  -cardiology consult placed  -patient is scheduled for [facility] today 10/9    Coronary artery disease involving native coronary artery of native heart with unstable angina pectoris  Patient with known CAD with no intervention based on patient's wishes, which is uncontrolled Will continue ASA, Plavix, and Statin and monitor for S/Sx of angina/ACS. Continue to monitor on telemetry.     Mixed hyperlipidemia  -continue with home meds      Type 2 diabetes mellitus with diabetic polyneuropathy, with long-term current use of insulin  -continue with sliding scale as patient is NPO  -glucometer checks  -A1c 7.5        VTE Risk Mitigation (From admission, onward)           Ordered     heparin (porcine) injection 5,000 Units  Every 8 hours         10/08/24 2235     IP VTE HIGH RISK PATIENT  Once         10/08/24 1930     Place sequential compression device  Until discontinued         10/08/24 1930     Place MADALYN hose  Until discontinued         10/08/24 1930                    Discharge Planning   ALFREDA: 10/8/2024     Code Status: Full Code   Is the patient medically ready for discharge?:     Reason for patient still in hospital (select all that apply): Laboratory test and Treatment  Discharge Plan A: Home                  Kristan Wright MD  Department of Hospital Medicine   [facility]

## 2024-10-09 NOTE — H&P
"[facility] Medicine  History & Physical    Patient Name: Terra Mcpherson  MRN: [id number]  Patient Class: OP- Observation  Admission Date: 10/8/2024  Attending Physician: Maria E Swanson MD   Primary Care Provider: Pola Machado MD         Patient information was obtained from patient and ER records.     Subjective:     Principal Problem:Chest pain    Chief Complaint:   Chief Complaint   Patient presents with    Chest Pain        HPI: The patient was a 69-year-old female with a past medical history of diabetes, hyperlipidemia, CAD, GERD and cataracts.  The patient was transferred from [place] for cardiac evaluation.  She presented to the hospital due to retrosternal chest pain that had lasted 2 days.  The patient was describes the pain as feeling like "a heavy brick is sitting on my chest".  She states the pain started on Sunday and was between a 3-4/10.  She described it as radiating to her bilateral upper arms, shoulders, armpit, behind her ears and between her shoulder blades.  She stated by today, the pain was 8/10.  She had a similar episode in July and had a CT angiogram done. Results showed: CAD-RADS 2. Diffuse atherosclerosis with long segment in mid LAD of 50-70% stenosis. Coronary calcium score 89.  She was continued on baby aspirin.  At the time she declined medical interventional therapy.  The patient had a left heart catheterization done about 20 years ago which was negative.  Due to her current symptoms she was transferred to [facility] for evaluation and angiogram by cardiology team.    On arrival, the patient was vitals has been stable.  Troponin is within normal limits but lipid panel elevated. EKG showed NSR rate 69, normal EKG, QTc 435 ms.  Patient was to be admitted to telemetry for further evaluation by Cardiology.    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     ST " elevation (STEMI) myocardial infarction of unspecified site        Past Surgical History:   Procedure Laterality Date    DILATION AND CURETTAGE OF UTERUS      ESOPHAGOGASTRODUODENOSCOPY      ESOPHAGOGASTRODUODENOSCOPY N/A 11/22/2023    Procedure: EGD (ESOPHAGOGASTRODUODENOSCOPY);  Surgeon: Brigitte Zeng MD;  Location: [facility];  Service: Endoscopy;  Laterality: N/A;    EYE SURGERY Bilateral     Cataract Surgery    HYSTERECTOMY      VAGINAL DELIVERY      x2       Review of patient's allergies indicates:   Allergen Reactions    Statins-hmg-coa reductase inhibitors Anaphylaxis and Rash     Swelling / Pancreatitis      Adhesive Itching     To Climara brand HRT patch and generic only.     Fenofibrate      Fingers turn purple     Metformin Other (See Comments)     Myalgia /GI UPSET      Omeprazole Other (See Comments)     Patient claims she experienced hair loss, finger nails falling off, rash, and edema due to medication.     Pamabrom      INTOLERANCE/ SEVERE DEHYDRATION : SIDE EFFECTS    Sulfa (sulfonamide antibiotics) Rash       Current Facility-Administered Medications on File Prior to Encounter   Medication    [DISCONTINUED] acetaminophen tablet 650 mg    [DISCONTINUED] aluminum-magnesium hydroxide-simethicone 200-200-20 mg/5 mL suspension 30 mL    [DISCONTINUED] aspirin EC tablet 81 mg    [DISCONTINUED] dextrose 10% bolus 125 mL 125 mL    [DISCONTINUED] dextrose 10% bolus 250 mL 250 mL    [DISCONTINUED] enoxaparin injection 40 mg    [DISCONTINUED] glucagon (human recombinant) injection 1 mg    [DISCONTINUED] HYDROcodone-acetaminophen 5-325 mg per tablet 1 tablet    [DISCONTINUED] insulin aspart U-100 pen 0-5 Units    [DISCONTINUED] isosorbide mononitrate 24 hr tablet 30 mg    [DISCONTINUED] melatonin tablet 6 mg    [DISCONTINUED] metoprolol tartrate (LOPRESSOR) tablet 25 mg    [DISCONTINUED] morphine injection 4 mg    [DISCONTINUED] multivitamin tablet    [DISCONTINUED] naloxone 0.4 mg/mL injection 0.02 mg     [DISCONTINUED] nitroGLYCERIN SL tablet 0.4 mg    [DISCONTINUED] ondansetron injection 4 mg    [DISCONTINUED] polyethylene glycol packet 17 g    [DISCONTINUED] prochlorperazine injection Soln 5 mg    [DISCONTINUED] senna-docusate 8.6-50 mg per tablet 1 tablet    [DISCONTINUED] simethicone chewable tablet 80 mg    [DISCONTINUED] sodium chloride 0.9% flush 10 mL     Current Outpatient Medications on File Prior to Encounter   Medication Sig    aspirin (ECOTRIN) 81 MG EC tablet Take 81 mg by mouth every morning.    BASAGLAR KWIKPEN U-100 INSULIN 100 unit/mL (3 mL) InPn pen Inject 34 Units into the skin once daily. (Patient taking differently: Inject 34 Units into the skin every evening.)    multivit-min/iron/FA/vit K/lut (CENTRUM SILVER WOMEN ORAL) Take 1 tablet by mouth Daily.    RESTASIS 0.05 % ophthalmic emulsion Place 1 drop into both eyes 2 (two) times daily.    blood-glucose sensor (FREESTYLE ASMITA 3 SENSOR) [place] by Misc.(Non-Drug; Combo Route) route.    metoprolol tartrate (LOPRESSOR) 25 MG tablet Take 1 tablet (25 mg total) by mouth On call Procedure (Please take 1 tab prior to cardiac CTA and bring the other 2 tabs with you.). (Patient not taking: Reported on 10/7/2024)    resveratroL 250 mg Cap Take by mouth. (Patient not taking: Reported on 6/6/2024)     Family History       Problem Relation (Age of Onset)    Colon cancer Other    No Known Problems Mother, Father          Tobacco Use    Smoking status: Never    Smokeless tobacco: Never   Substance and Sexual Activity    Alcohol use: Never    Drug use: Yes     Types: Other-see comments     Comment: 5CBD-gummies for sleep    Sexual activity: Not Currently     Review of Systems   Constitutional:  Negative for activity change, chills and fever.   HENT:  Negative for congestion, rhinorrhea and sore throat.    Eyes:  Negative for discharge and redness.   Respiratory:  Negative for cough, chest tightness, shortness of breath and wheezing.    Cardiovascular:   Positive for chest pain. Negative for palpitations and leg swelling.   Gastrointestinal:  Negative for abdominal pain, constipation, diarrhea, nausea and vomiting.   Genitourinary:  Negative for dysuria, flank pain and hematuria.   Musculoskeletal:  Negative for back pain, myalgias and neck pain.        Pain in upper arms, bilateral shoulders, between shoulder blades and armpit   Skin:  Negative for pallor, rash and wound.   Neurological:  Negative for dizziness, tremors, syncope, weakness, numbness and headaches.   Psychiatric/Behavioral:  Negative for agitation, confusion and hallucinations.      Objective:     Vital Signs (Most Recent):  BP: 130/77 (10/08/24 2009) Vital Signs (24h Range):  Temp:  [97.9 °F (36.6 °C)-98.1 °F (36.7 °C)] 97.9 °F (36.6 °C)  Pulse:  [49-88] 70  Resp:  [14-20] 18  SpO2:  [93 %-96 %] 96 %  BP: (113-140)/(55-77) 130/77     Weight: 72.6 kg (160 lb 0.9 oz)  Body mass index is 26.63 kg/m².     Physical Exam  Vitals and nursing note reviewed.   Constitutional:       General: She is not in acute distress.  HENT:      Head: Normocephalic and atraumatic.      Mouth/Throat:      Mouth: Mucous membranes are moist.   Eyes:      General:         Right eye: No discharge.         Left eye: No discharge.      Conjunctiva/sclera: Conjunctivae normal.   Cardiovascular:      Rate and Rhythm: Normal rate and regular rhythm.      Pulses: Normal pulses.   Pulmonary:      Effort: Pulmonary effort is normal.      Breath sounds: Normal breath sounds.   Abdominal:      General: Bowel sounds are normal.      Palpations: Abdomen is soft.   Musculoskeletal:         General: No swelling. Normal range of motion.      Cervical back: Normal range of motion and neck supple.   Skin:     General: Skin is warm and dry.   Neurological:      Mental Status: She is alert and oriented to person, place, and time. Mental status is at baseline.   Psychiatric:         Mood and Affect: Mood normal.                Significant Labs:  All pertinent labs within the past 24 hours have been reviewed.    Significant Imaging: I have reviewed all pertinent imaging results/findings within the past 24 hours.  Assessment/Plan:     * Chest pain  -patient complained of feeling like there is a heavy brick on her chest  -troponins negative  -cardiac monitor  -7/14/2024 CT angiogram of the coronary arteries demonstrated Diffuse atherosclerosis with long segment in mid LAD of 50-70% stenosis.Coronary calcium score 89.   -continue with aspirin, beta-blocker and isosorbide  -cardiology consult placed  -possible angiogram in a.m.      Mixed hyperlipidemia  -continue with home meds      Type 2 diabetes mellitus with diabetic polyneuropathy, with long-term current use of insulin  -continue with sliding scale as patient is NPO  -glucometer checks  -A1c 7.5        VTE Risk Mitigation (From admission, onward)           Ordered     heparin (porcine) injection 5,000 Units  Every 8 hours         10/08/24 2235     IP VTE HIGH RISK PATIENT  Once         10/08/24 1930     Place sequential compression device  Until discontinued         10/08/24 1930     Place MADALYN hose  Until discontinued         10/08/24 1930                       On 10/08/2024, patient should be placed in hospital observation services under my care.           10/08/24 2024   Admission   Initial VN Admission Questions Complete   Communication Issues? None   Shift   Virtual Nurse - Patient Verbalized Approval Of Camera Use   Safety/Activity   Patient Rounds bed in low position;placement of personal items at bedside;call light in patient/parent reach;visualized patient;clutter free environment maintained   Safety Promotion/Fall Prevention assistive device/personal item within reach;instructed to call staff for mobility;medications reviewed;room near unit station;side rails raised x 2     VN cued into room for introduction. Informed patient that VN would be working alongside bedside nurse and PCT throughout  shift.  Education provided and documented under education tab. Call bell in reach and instructed on how to call for assistance.  Patient verbalized all understanding.  Allowed time for questions.      Pola Andrew MD  Department of Hospital Medicine  [facility] Surg

## 2024-10-09 NOTE — PLAN OF CARE
Problem: Adult Inpatient Plan of Care  Goal: Absence of Hospital-Acquired Illness or Injury  Outcome: Progressing     Problem: Adult Inpatient Plan of Care  Goal: Optimal Comfort and Wellbeing  Outcome: Progressing     Problem: Diabetes Comorbidity  Goal: Blood Glucose Level Within Targeted Range  Outcome: Progressing       POC reviewed with pt, following- VSS, NADN, pt resting quietly this shift. NPO. Chest pain up to 3/10 at worse so far tonight. No falls or injuries noted, CB in reach at all times. Instructed to call for needs not met on rounds, v/u.

## 2024-10-09 NOTE — ASSESSMENT & PLAN NOTE
- FLP with total cholesterol 281  HDL 40   - reports intolerance to statin therapy; deferred initiation of PCSK9 during office visit with VALENTINA Murphy NP  - discussed importance of  aggressive control of cholesterol needed and  discussed PCSK9 therapy and open to initiation- will order Repatha

## 2024-10-09 NOTE — NURSING
Received call from [employer] who reports that the patients box is missing, call to ICU per Willem the box did not come up with the patient, call placed to Cath lab, no answer. Notified charge nurse Nona.

## 2024-10-09 NOTE — NURSING
Received report from Cindy, received the patient lying supine with HOB elevated and call light in reach at present, instructed to call for assistance.

## 2024-10-09 NOTE — ASSESSMENT & PLAN NOTE
-patient complained of feeling like there is a heavy brick on her chest  -troponins negative  -cardiac monitor  -7/14/2024 CT angiogram of the coronary arteries demonstrated Diffuse atherosclerosis with long segment in mid LAD of 50-70% stenosis.Coronary calcium score 89.   -continue with aspirin, beta-blocker and isosorbide  -cardiology consult placed  -patient is scheduled for [facility] today 10/9

## 2024-10-09 NOTE — NURSING
Patient going to ICU from the Cath Lab, report called to ICU nurse Willem, informed him that the patient has the telebox which was given to cath lab nurse Brianna earlier today. Willem stated that he will return it to the tele tech.

## 2024-10-09 NOTE — PROGRESS NOTES
10/08/24 2024   Admission   Initial VN Admission Questions Complete   Communication Issues? None   Shift   Virtual Nurse - Patient Verbalized Approval Of Camera Use   Safety/Activity   Patient Rounds bed in low position;placement of personal items at bedside;call light in patient/parent reach;visualized patient;clutter free environment maintained   Safety Promotion/Fall Prevention assistive device/personal item within reach;instructed to call staff for mobility;medications reviewed;room near unit station;side rails raised x 2     VN cued into room for introduction. Informed patient that VN would be working alongside bedside nurse and PCT throughout shift.  Education provided and documented under education tab. Call bell in reach and instructed on how to call for assistance.  Patient verbalized all understanding.  Allowed time for questions.

## 2024-10-09 NOTE — SUBJECTIVE & OBJECTIVE
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     ST elevation (STEMI) myocardial infarction of unspecified site        Past Surgical History:   Procedure Laterality Date    DILATION AND CURETTAGE OF UTERUS      ESOPHAGOGASTRODUODENOSCOPY      ESOPHAGOGASTRODUODENOSCOPY N/A 11/22/2023    Procedure: EGD (ESOPHAGOGASTRODUODENOSCOPY);  Surgeon: Brigitte Zeng MD;  Location: [facility];  Service: Endoscopy;  Laterality: N/A;    EYE SURGERY Bilateral     Cataract Surgery    HYSTERECTOMY      VAGINAL DELIVERY      x2       Review of patient's allergies indicates:   Allergen Reactions    Statins-hmg-coa reductase inhibitors Anaphylaxis and Rash     Swelling / Pancreatitis      Adhesive Itching     To Climara brand HRT patch and generic only.     Fenofibrate      Fingers turn purple     Metformin Other (See Comments)     Myalgia /GI UPSET      Omeprazole Other (See Comments)     Patient claims she experienced hair loss, finger nails falling off, rash, and edema due to medication.     Pamabrom      INTOLERANCE/ SEVERE DEHYDRATION : SIDE EFFECTS    Sulfa (sulfonamide antibiotics) Rash       Current Facility-Administered Medications on File Prior to Encounter   Medication    [DISCONTINUED] acetaminophen tablet 650 mg    [DISCONTINUED] aluminum-magnesium hydroxide-simethicone 200-200-20 mg/5 mL suspension 30 mL    [DISCONTINUED] aspirin EC tablet 81 mg    [DISCONTINUED] dextrose 10% bolus 125 mL 125 mL    [DISCONTINUED] dextrose 10% bolus 250 mL 250 mL    [DISCONTINUED] enoxaparin injection 40 mg    [DISCONTINUED] glucagon (human recombinant) injection 1 mg    [DISCONTINUED] HYDROcodone-acetaminophen 5-325 mg per tablet 1 tablet    [DISCONTINUED] insulin aspart U-100 pen 0-5 Units    [DISCONTINUED] isosorbide mononitrate 24 hr tablet 30 mg    [DISCONTINUED] melatonin tablet 6 mg    [DISCONTINUED]  metoprolol tartrate (LOPRESSOR) tablet 25 mg    [DISCONTINUED] morphine injection 4 mg    [DISCONTINUED] multivitamin tablet    [DISCONTINUED] naloxone 0.4 mg/mL injection 0.02 mg    [DISCONTINUED] nitroGLYCERIN SL tablet 0.4 mg    [DISCONTINUED] ondansetron injection 4 mg    [DISCONTINUED] polyethylene glycol packet 17 g    [DISCONTINUED] prochlorperazine injection Soln 5 mg    [DISCONTINUED] senna-docusate 8.6-50 mg per tablet 1 tablet    [DISCONTINUED] simethicone chewable tablet 80 mg    [DISCONTINUED] sodium chloride 0.9% flush 10 mL     Current Outpatient Medications on File Prior to Encounter   Medication Sig    aspirin (ECOTRIN) 81 MG EC tablet Take 81 mg by mouth every morning.    BASAGLAR KWIKPEN U-100 INSULIN 100 unit/mL (3 mL) InPn pen Inject 34 Units into the skin once daily. (Patient taking differently: Inject 34 Units into the skin every evening.)    multivit-min/iron/FA/vit K/lut (CENTRUM SILVER WOMEN ORAL) Take 1 tablet by mouth Daily.    RESTASIS 0.05 % ophthalmic emulsion Place 1 drop into both eyes 2 (two) times daily.    blood-glucose sensor (FREESTYLE ASMITA 3 SENSOR) [place] by Misc.(Non-Drug; Combo Route) route.    metoprolol tartrate (LOPRESSOR) 25 MG tablet Take 1 tablet (25 mg total) by mouth On call Procedure (Please take 1 tab prior to cardiac CTA and bring the other 2 tabs with you.). (Patient not taking: Reported on 10/7/2024)    resveratroL 250 mg Cap Take by mouth. (Patient not taking: Reported on 6/6/2024)     Family History       Problem Relation (Age of Onset)    Colon cancer Other    No Known Problems Mother, Father          Tobacco Use    Smoking status: Never    Smokeless tobacco: Never   Substance and Sexual Activity    Alcohol use: Never    Drug use: Yes     Types: Other-see comments     Comment: 5CBD-gummies for sleep    Sexual activity: Not Currently     Review of Systems   Constitutional: Negative for chills, decreased appetite, diaphoresis, fever,  malaise/fatigue, weight gain and weight loss.   Cardiovascular:  Positive for chest pain. Negative for claudication, dyspnea on exertion, irregular heartbeat, leg swelling, near-syncope, orthopnea, palpitations and paroxysmal nocturnal dyspnea.   Respiratory:  Negative for cough, shortness of breath, snoring, sputum production and wheezing.    Endocrine: Negative for cold intolerance, heat intolerance, polydipsia, polyphagia and polyuria.   Skin:  Negative for color change, dry skin, itching, nail changes and poor wound healing.   Musculoskeletal:  Negative for back pain, gout, joint pain and joint swelling.   Gastrointestinal:  Negative for bloating, abdominal pain, constipation, diarrhea, hematemesis, hematochezia, melena, nausea and vomiting.   Genitourinary:  Negative for dysuria, hematuria and nocturia.   Neurological:  Negative for dizziness, headaches, light-headedness, numbness, paresthesias and weakness.   Psychiatric/Behavioral:  Negative for altered mental status, depression and memory loss.      Objective:     Vital Signs (Most Recent):  Temp: 98 °F (36.7 °C) (10/09/24 0718)  Pulse: 64 (10/09/24 0718)  Resp: 20 (10/09/24 0718)  BP: (!) 147/78 (10/09/24 0718)  SpO2: 95 % (10/09/24 0718) Vital Signs (24h Range):  Temp:  [97.5 °F (36.4 °C)-98 °F (36.7 °C)] 98 °F (36.7 °C)  Pulse:  [49-70] 64  Resp:  [16-20] 20  SpO2:  [95 %-96 %] 95 %  BP: (115-147)/(55-78) 147/78     Weight: 72.6 kg (160 lb 0.9 oz)  Body mass index is 26.63 kg/m².    SpO2: 95 %       No intake or output data in the 24 hours ending 10/09/24 0843    Lines/Drains/Airways       Peripheral Intravenous Line  Duration                  Peripheral IV - Single Lumen 10/07/24 1450 18 G Anterior;Left;Proximal Forearm 1 day                     Physical Exam  Constitutional:       General: She is not in acute distress.     Appearance: She is well-developed.   Cardiovascular:      Rate and Rhythm: Normal rate and regular rhythm.      Heart sounds: No  murmur heard.     No gallop.   Pulmonary:      Effort: Pulmonary effort is normal. No respiratory distress.      Breath sounds: Normal breath sounds. No wheezing.   Abdominal:      General: Bowel sounds are normal. There is no distension.      Palpations: Abdomen is soft.      Tenderness: There is no abdominal tenderness.   Skin:     General: Skin is warm and dry.   Neurological:      Mental Status: She is alert and oriented to person, place, and time.        Significant Labs: BMP:   Recent Labs   Lab 10/07/24  1149 10/08/24  0617 10/09/24  0546   * 124* 174*    139 137   K 4.1 4.0 4.0    111* 108   CO2 22* 21* 17*   BUN 17 15 21   CREATININE 0.8 0.8 0.8   CALCIUM 9.6 8.8 9.3   MG  --  2.1 1.8   , CBC   Recent Labs   Lab 10/07/24  1149 10/08/24  0617 10/09/24  0546   WBC 8.94 8.99 9.20   HGB 16.0 13.4 15.0   HCT 43.3 37.6 41.6    292 303   , and Troponin   Recent Labs   Lab 10/08/24  0220 10/08/24  0617 10/08/24  1218   TROPONINI <0.006 <0.006 <0.006       Significant Imaging: Echocardiogram: Transthoracic echo (TTE) complete (Cupid Only):   Results for orders placed or performed during the hospital encounter of 06/17/24   Echo   Result Value Ref Range    LVOT stroke volume 65.02 cm3    LVIDd 3.26 (A) 3.5 - 6.0 cm    LV Systolic Volume 10.67 mL    LVIDs 1.87 (A) 2.1 - 4.0 cm    LV Diastolic Volume 42.84 mL    IVS 1.17 (A) 0.6 - 1.1 cm    LVOT diameter 2.01 cm    LVOT area 3.2 cm2    FS 43 28 - 44 %    Left Ventricle Relative Wall Thickness 0.48 cm    PW 0.79 0.6 - 1.1 cm    LV mass 90.12 g    MV Peak E Juan Luis 0.68 m/s    TDI LATERAL 0.10 m/s    TDI SEPTAL 0.07 m/s    E/E' ratio 8.00 m/s    MV Peak A Juan Luis 0.73 m/s    E/A ratio 0.93     E wave deceleration time 170.47 msec    LV SEPTAL E/E' RATIO 9.71 m/s    LV LATERAL E/E' RATIO 6.80 m/s    PV Peak S Juan Luis 0.59 m/s    PV Peak D Juan Luis 0.40 m/s    Pulm vein S/D ratio 1.48     LVOT peak juan luis 0.83 m/s    Left Ventricular Outflow Tract Mean Velocity 0.59  cm/s    Left Ventricular Outflow Tract Mean Gradient 1.55 mmHg    RVDD 2.79 cm    RV S' 15.73 cm/s    TAPSE 2.14 cm    RV/LV Ratio 0.86 cm    LA size 3.06 cm    Left Atrium Major Axis 3.93 cm    RA Major Axis 4.66 cm    AV mean gradient 3 mmHg    AV peak gradient 7 mmHg    Ao peak neeraj 1.28 m/s    Ao VTI 31.10 cm    LVOT peak VTI 20.50 cm    AV valve area 2.09 cm²    AV Velocity Ratio 0.65     AV index (prosthetic) 0.66     HERMELINDO by Velocity Ratio 2.06 cm²    Mr max neeraj 4.77 m/s    MV stenosis pressure 1/2 time 48.57 ms    MV valve area p 1/2 method 4.53 cm2    PV PEAK VELOCITY 0.76 m/s    PV peak gradient 2 mmHg    Pulmonary Valve Mean Velocity 0.50 m/s    IVC diameter 1.68 cm    Mean e' 0.09 m/s    Est. RA pres 3 mmHg    Narrative      Left Ventricle: There is normal systolic function with a visually   estimated ejection fraction of 60 - 65%. There is normal diastolic   function.    Right Ventricle: Normal right ventricular cavity size. Systolic   function is normal.    Mitral Valve: There is mild regurgitation.    Tricuspid Valve: There is mild regurgitation.    IVC/SVC: Normal venous pressure at 3 mmHg.

## 2024-10-09 NOTE — PROGRESS NOTES
Single lumen 20G, 2.25IN AccuCath placed in the right basilic vein. Needle advanced into the vessel under real time ultrasound guidance.    Max dwell date: 10/19/24   Lot number: [id number]

## 2024-10-09 NOTE — H&P (VIEW-ONLY)
[place] [facility]  Cardiology  Consult Note    Patient Name: Terra Mcpherson  MRN: [id number]  Admission Date: 10/8/2024  Hospital Length of Stay: 0 days  Code Status: Full Code   Attending Provider: Kristan Santana MD   Consulting Provider: KENISHA Mancera ANP  Primary Care Physician: Pola Machado MD  Principal Problem:Chest pain    Patient information was obtained from patient, past medical records, and ER records.     Inpatient consult to [facility]  Consult performed by: Henrietta Moran APRN, ANP  Consult ordered by: Pola Andrew MD  Reason for consult: chest pain        Subjective:     Chief Complaint:  chest pain      HPI:   70yo female with DMII, HLP, chest pain, venous insufficiency, GERD, statin intolerance and murmur who was transferred from [facility] for further cardiac evaluation. She presented to the ER with complaints of chest pain for the past 2 days. She described the pain as pressure sensation as if a heavy brick was on her chest. She reports the pain started on Sunday and was rated as a  3-4 out of 10 and was associated with radiation to her arms and shoulder blades. The pain then increased to 8/10 the following day and presented to the ER for evaluation. Labs troponin negative x 4; EKG with no acute changes. CBC and BMP WNL. Admitted to [facility] and Cardiology consulted for evaluation of chest pain     Of note, she was seen earlier this year at [place] with CTA cardiac with calcium score 89 mid LAD 50-70% she opted not to proceed with further evaluation. Her cholesterol was also elevated at that time with intolerance to statin therapy and opted against PCSK9 therapy          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     ST elevation (STEMI) myocardial infarction of unspecified site        Past Surgical History:   Procedure Laterality Date    DILATION AND  CURETTAGE OF UTERUS      ESOPHAGOGASTRODUODENOSCOPY      ESOPHAGOGASTRODUODENOSCOPY N/A 11/22/2023    Procedure: EGD (ESOPHAGOGASTRODUODENOSCOPY);  Surgeon: Brigitte Zeng MD;  Location: [facility];  Service: Endoscopy;  Laterality: N/A;    EYE SURGERY Bilateral     Cataract Surgery    HYSTERECTOMY      VAGINAL DELIVERY      x2       Review of patient's allergies indicates:   Allergen Reactions    Statins-hmg-coa reductase inhibitors Anaphylaxis and Rash     Swelling / Pancreatitis      Adhesive Itching     To Climara brand HRT patch and generic only.     Fenofibrate      Fingers turn purple     Metformin Other (See Comments)     Myalgia /GI UPSET      Omeprazole Other (See Comments)     Patient claims she experienced hair loss, finger nails falling off, rash, and edema due to medication.     Pamabrom      INTOLERANCE/ SEVERE DEHYDRATION : SIDE EFFECTS    Sulfa (sulfonamide antibiotics) Rash       Current Facility-Administered Medications on File Prior to Encounter   Medication    [DISCONTINUED] acetaminophen tablet 650 mg    [DISCONTINUED] aluminum-magnesium hydroxide-simethicone 200-200-20 mg/5 mL suspension 30 mL    [DISCONTINUED] aspirin EC tablet 81 mg    [DISCONTINUED] dextrose 10% bolus 125 mL 125 mL    [DISCONTINUED] dextrose 10% bolus 250 mL 250 mL    [DISCONTINUED] enoxaparin injection 40 mg    [DISCONTINUED] glucagon (human recombinant) injection 1 mg    [DISCONTINUED] HYDROcodone-acetaminophen 5-325 mg per tablet 1 tablet    [DISCONTINUED] insulin aspart U-100 pen 0-5 Units    [DISCONTINUED] isosorbide mononitrate 24 hr tablet 30 mg    [DISCONTINUED] melatonin tablet 6 mg    [DISCONTINUED] metoprolol tartrate (LOPRESSOR) tablet 25 mg    [DISCONTINUED] morphine injection 4 mg    [DISCONTINUED] multivitamin tablet    [DISCONTINUED] naloxone 0.4 mg/mL injection 0.02 mg    [DISCONTINUED] nitroGLYCERIN SL tablet 0.4 mg    [DISCONTINUED] ondansetron injection 4 mg    [DISCONTINUED] polyethylene glycol  packet 17 g    [DISCONTINUED] prochlorperazine injection Soln 5 mg    [DISCONTINUED] senna-docusate 8.6-50 mg per tablet 1 tablet    [DISCONTINUED] simethicone chewable tablet 80 mg    [DISCONTINUED] sodium chloride 0.9% flush 10 mL     Current Outpatient Medications on File Prior to Encounter   Medication Sig    aspirin (ECOTRIN) 81 MG EC tablet Take 81 mg by mouth every morning.    BASAGLAR KWIKPEN U-100 INSULIN 100 unit/mL (3 mL) InPn pen Inject 34 Units into the skin once daily. (Patient taking differently: Inject 34 Units into the skin every evening.)    multivit-min/iron/FA/vit K/lut (CENTRUM SILVER WOMEN ORAL) Take 1 tablet by mouth Daily.    RESTASIS 0.05 % ophthalmic emulsion Place 1 drop into both eyes 2 (two) times daily.    blood-glucose sensor (FREESTYLE ASMITA 3 SENSOR) Vicenta by Misc.(Non-Drug; Combo Route) route.    metoprolol tartrate (LOPRESSOR) 25 MG tablet Take 1 tablet (25 mg total) by mouth On call Procedure (Please take 1 tab prior to cardiac CTA and bring the other 2 tabs with you.). (Patient not taking: Reported on 10/7/2024)    resveratroL 250 mg Cap Take by mouth. (Patient not taking: Reported on 6/6/2024)     Family History       Problem Relation (Age of Onset)    Colon cancer Other    No Known Problems Mother, Father          Tobacco Use    Smoking status: Never    Smokeless tobacco: Never   Substance and Sexual Activity    Alcohol use: Never    Drug use: Yes     Types: Other-see comments     Comment: 5CBD-gummies for sleep    Sexual activity: Not Currently     Review of Systems   Constitutional: Negative for chills, decreased appetite, diaphoresis, fever, malaise/fatigue, weight gain and weight loss.   Cardiovascular:  Positive for chest pain. Negative for claudication, dyspnea on exertion, irregular heartbeat, leg swelling, near-syncope, orthopnea, palpitations and paroxysmal nocturnal dyspnea.   Respiratory:  Negative for cough, shortness of breath, snoring, sputum production and  wheezing.    Endocrine: Negative for cold intolerance, heat intolerance, polydipsia, polyphagia and polyuria.   Skin:  Negative for color change, dry skin, itching, nail changes and poor wound healing.   Musculoskeletal:  Negative for back pain, gout, joint pain and joint swelling.   Gastrointestinal:  Negative for bloating, abdominal pain, constipation, diarrhea, hematemesis, hematochezia, melena, nausea and vomiting.   Genitourinary:  Negative for dysuria, hematuria and nocturia.   Neurological:  Negative for dizziness, headaches, light-headedness, numbness, paresthesias and weakness.   Psychiatric/Behavioral:  Negative for altered mental status, depression and memory loss.      Objective:     Vital Signs (Most Recent):  Temp: 98 °F (36.7 °C) (10/09/24 0718)  Pulse: 64 (10/09/24 0718)  Resp: 20 (10/09/24 0718)  BP: (!) 147/78 (10/09/24 0718)  SpO2: 95 % (10/09/24 0718) Vital Signs (24h Range):  Temp:  [97.5 °F (36.4 °C)-98 °F (36.7 °C)] 98 °F (36.7 °C)  Pulse:  [49-70] 64  Resp:  [16-20] 20  SpO2:  [95 %-96 %] 95 %  BP: (115-147)/(55-78) 147/78     Weight: 72.6 kg (160 lb 0.9 oz)  Body mass index is 26.63 kg/m².    SpO2: 95 %       No intake or output data in the 24 hours ending 10/09/24 0843    Lines/Drains/Airways       Peripheral Intravenous Line  Duration                  Peripheral IV - Single Lumen 10/07/24 1450 18 G Anterior;Left;Proximal Forearm 1 day                     Physical Exam  Constitutional:       General: She is not in acute distress.     Appearance: She is well-developed.   Cardiovascular:      Rate and Rhythm: Normal rate and regular rhythm.      Heart sounds: No murmur heard.     No gallop.   Pulmonary:      Effort: Pulmonary effort is normal. No respiratory distress.      Breath sounds: Normal breath sounds. No wheezing.   Abdominal:      General: Bowel sounds are normal. There is no distension.      Palpations: Abdomen is soft.      Tenderness: There is no abdominal tenderness.   Skin:      General: Skin is warm and dry.   Neurological:      Mental Status: She is alert and oriented to person, place, and time.        Significant Labs: BMP:   Recent Labs   Lab 10/07/24  1149 10/08/24  0617 10/09/24  0546   * 124* 174*    139 137   K 4.1 4.0 4.0    111* 108   CO2 22* 21* 17*   BUN 17 15 21   CREATININE 0.8 0.8 0.8   CALCIUM 9.6 8.8 9.3   MG  --  2.1 1.8   , CBC   Recent Labs   Lab 10/07/24  1149 10/08/24  0617 10/09/24  0546   WBC 8.94 8.99 9.20   HGB 16.0 13.4 15.0   HCT 43.3 37.6 41.6    292 303   , and Troponin   Recent Labs   Lab 10/08/24  0220 10/08/24  0617 10/08/24  1218   TROPONINI <0.006 <0.006 <0.006       Significant Imaging: Echocardiogram: Transthoracic echo (TTE) complete ([facility] Only):   Results for orders placed or performed during the hospital encounter of 06/17/24   Echo   Result Value Ref Range    LVOT stroke volume 65.02 cm3    LVIDd 3.26 (A) 3.5 - 6.0 cm    LV Systolic Volume 10.67 mL    LVIDs 1.87 (A) 2.1 - 4.0 cm    LV Diastolic Volume 42.84 mL    IVS 1.17 (A) 0.6 - 1.1 cm    LVOT diameter 2.01 cm    LVOT area 3.2 cm2    FS 43 28 - 44 %    Left Ventricle Relative Wall Thickness 0.48 cm    PW 0.79 0.6 - 1.1 cm    LV mass 90.12 g    MV Peak E Juan Luis 0.68 m/s    TDI LATERAL 0.10 m/s    TDI SEPTAL 0.07 m/s    E/E' ratio 8.00 m/s    MV Peak A Juan Luis 0.73 m/s    E/A ratio 0.93     E wave deceleration time 170.47 msec    LV SEPTAL E/E' RATIO 9.71 m/s    LV LATERAL E/E' RATIO 6.80 m/s    PV Peak S Juan Luis 0.59 m/s    PV Peak D Juan Luis 0.40 m/s    Pulm vein S/D ratio 1.48     LVOT peak juan luis 0.83 m/s    Left Ventricular Outflow Tract Mean Velocity 0.59 cm/s    Left Ventricular Outflow Tract Mean Gradient 1.55 mmHg    RVDD 2.79 cm    RV S' 15.73 cm/s    TAPSE 2.14 cm    RV/LV Ratio 0.86 cm    LA size 3.06 cm    Left Atrium Major Axis 3.93 cm    RA Major Axis 4.66 cm    AV mean gradient 3 mmHg    AV peak gradient 7 mmHg    Ao peak juan luis 1.28 m/s    Ao VTI 31.10 cm    LVOT peak VTI 20.50  cm    AV valve area 2.09 cm²    AV Velocity Ratio 0.65     AV index (prosthetic) 0.66     HERMELINDO by Velocity Ratio 2.06 cm²    Mr max neeraj 4.77 m/s    MV stenosis pressure 1/2 time 48.57 ms    MV valve area p 1/2 method 4.53 cm2    PV PEAK VELOCITY 0.76 m/s    PV peak gradient 2 mmHg    Pulmonary Valve Mean Velocity 0.50 m/s    IVC diameter 1.68 cm    Mean e' 0.09 m/s    Est. RA pres 3 mmHg    Narrative      Left Ventricle: There is normal systolic function with a visually   estimated ejection fraction of 60 - 65%. There is normal diastolic   function.    Right Ventricle: Normal right ventricular cavity size. Systolic   function is normal.    Mitral Valve: There is mild regurgitation.    Tricuspid Valve: There is mild regurgitation.    IVC/SVC: Normal venous pressure at 3 mmHg.       Assessment and Plan:     * Chest pain  - presented with chest pain concerning for [place]  - troponin negative x 4; initial EKG with no acute changes; repeat EKG with ?anterior infarct   - on ASA therapy; given presentation and coronary CTA findings from earlier this year feel further evaluation needed with [facility] and will proceed today     Coronary artery disease involving native coronary artery of native heart with unstable angina pectoris  - seen previously by ABIMAEL Murphy FNP in [place]  - previous cardiac CTA with mid LAD 50-70%  - needs further evaluation with [facility]; further recommendations after [facility]     Mixed hyperlipidemia  - FLP with total cholesterol 281  HDL 40   - reports intolerance to statin therapy; deferred initiation of PCSK9 during office visit with VALENTINA Murphy NP  - discussed importance of  aggressive control of cholesterol needed and  discussed PCSK9 therapy and open to initiation- will order Repatha       Type 2 diabetes mellitus with diabetic polyneuropathy, with long-term current use of insulin  - HgbA1c 7.5 down from 10.4  - reinforced importance of aggressive BS control  - DM managed by primary team         VTE Risk  Mitigation (From admission, onward)           Ordered     heparin (porcine) injection 5,000 Units  Every 8 hours         10/08/24 2235     IP VTE HIGH RISK PATIENT  Once         10/08/24 1930     Place sequential compression device  Until discontinued         10/08/24 1930     Place MADALYN hose  Until discontinued         10/08/24 1930                    Thank you for your consult. I will follow-up with patient. Please contact us if you have any additional questions.    KENISHA Mancera, ANP  Cardiology   [facility]

## 2024-10-09 NOTE — ASSESSMENT & PLAN NOTE
- presented with chest pain concerning for [place]  - troponin negative x 4; initial EKG with no acute changes; repeat EKG with ?anterior infarct   - on ASA therapy; given presentation and coronary CTA findings from earlier this year feel further evaluation needed with [facility] and will proceed today

## 2024-10-09 NOTE — CONSULTS
[place] [facility] Surg  Cardiology  Consult Note    Patient Name: Terra Mcpherson  MRN: [id number]  Admission Date: 10/8/2024  Hospital Length of Stay: 0 days  Code Status: Full Code   Attending Provider: Kristan Santana MD   Consulting Provider: KENISHA Mancera ANP  Primary Care Physician: Pola Machado MD  Principal Problem:Chest pain    Patient information was obtained from patient, past medical records, and ER records.     Inpatient consult to [facility]  Consult performed by: Henrietta Moran APRN, ANP  Consult ordered by: Pola Andrew MD  Reason for consult: chest pain        Subjective:     Chief Complaint:  chest pain      HPI:   68yo female with DMII, HLP, chest pain, venous insufficiency, GERD, statin intolerance and murmur who was transferred from [facility] for further cardiac evaluation. She presented to the ER with complaints of chest pain for the past 2 days. She described the pain as pressure sensation as if a heavy brick was on her chest. She reports the pain started on Sunday and was rated as a  3-4 out of 10 and was associated with radiation to her arms and shoulder blades. The pain then increased to 8/10 the following day and presented to the ER for evaluation. Labs troponin negative x 4; EKG with no acute changes. CBC and BMP WNL. Admitted to [facility] Medicine and Cardiology consulted for evaluation of chest pain     Of note, she was seen earlier this year at [place] with CTA cardiac with calcium score 89 mid LAD 50-70% she opted not to proceed with further evaluation. Her cholesterol was also elevated at that time with intolerance to statin therapy and opted against PCSK9 therapy          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     ST elevation (STEMI) myocardial infarction of unspecified site        Past Surgical History:   Procedure Laterality Date    DILATION AND  CURETTAGE OF UTERUS      ESOPHAGOGASTRODUODENOSCOPY      ESOPHAGOGASTRODUODENOSCOPY N/A 11/22/2023    Procedure: EGD (ESOPHAGOGASTRODUODENOSCOPY);  Surgeon: Brigitte Zeng MD;  Location: [facility];  Service: Endoscopy;  Laterality: N/A;    EYE SURGERY Bilateral     Cataract Surgery    HYSTERECTOMY      VAGINAL DELIVERY      x2       Review of patient's allergies indicates:   Allergen Reactions    Statins-hmg-coa reductase inhibitors Anaphylaxis and Rash     Swelling / Pancreatitis      Adhesive Itching     To Climara brand HRT patch and generic only.     Fenofibrate      Fingers turn purple     Metformin Other (See Comments)     Myalgia /GI UPSET      Omeprazole Other (See Comments)     Patient claims she experienced hair loss, finger nails falling off, rash, and edema due to medication.     Pamabrom      INTOLERANCE/ SEVERE DEHYDRATION : SIDE EFFECTS    Sulfa (sulfonamide antibiotics) Rash       Current Facility-Administered Medications on File Prior to Encounter   Medication    [DISCONTINUED] acetaminophen tablet 650 mg    [DISCONTINUED] aluminum-magnesium hydroxide-simethicone 200-200-20 mg/5 mL suspension 30 mL    [DISCONTINUED] aspirin EC tablet 81 mg    [DISCONTINUED] dextrose 10% bolus 125 mL 125 mL    [DISCONTINUED] dextrose 10% bolus 250 mL 250 mL    [DISCONTINUED] enoxaparin injection 40 mg    [DISCONTINUED] glucagon (human recombinant) injection 1 mg    [DISCONTINUED] HYDROcodone-acetaminophen 5-325 mg per tablet 1 tablet    [DISCONTINUED] insulin aspart U-100 pen 0-5 Units    [DISCONTINUED] isosorbide mononitrate 24 hr tablet 30 mg    [DISCONTINUED] melatonin tablet 6 mg    [DISCONTINUED] metoprolol tartrate (LOPRESSOR) tablet 25 mg    [DISCONTINUED] morphine injection 4 mg    [DISCONTINUED] multivitamin tablet    [DISCONTINUED] naloxone 0.4 mg/mL injection 0.02 mg    [DISCONTINUED] nitroGLYCERIN SL tablet 0.4 mg    [DISCONTINUED] ondansetron injection 4 mg    [DISCONTINUED] polyethylene glycol  packet 17 g    [DISCONTINUED] prochlorperazine injection Soln 5 mg    [DISCONTINUED] senna-docusate 8.6-50 mg per tablet 1 tablet    [DISCONTINUED] simethicone chewable tablet 80 mg    [DISCONTINUED] sodium chloride 0.9% flush 10 mL     Current Outpatient Medications on File Prior to Encounter   Medication Sig    aspirin (ECOTRIN) 81 MG EC tablet Take 81 mg by mouth every morning.    BASAGLAR KWIKPEN U-100 INSULIN 100 unit/mL (3 mL) InPn pen Inject 34 Units into the skin once daily. (Patient taking differently: Inject 34 Units into the skin every evening.)    multivit-min/iron/FA/vit K/lut (CENTRUM SILVER WOMEN ORAL) Take 1 tablet by mouth Daily.    RESTASIS 0.05 % ophthalmic emulsion Place 1 drop into both eyes 2 (two) times daily.    blood-glucose sensor (FREESTYLE ASMITA 3 SENSOR) Vicenta by Misc.(Non-Drug; Combo Route) route.    metoprolol tartrate (LOPRESSOR) 25 MG tablet Take 1 tablet (25 mg total) by mouth On call Procedure (Please take 1 tab prior to cardiac CTA and bring the other 2 tabs with you.). (Patient not taking: Reported on 10/7/2024)    resveratroL 250 mg Cap Take by mouth. (Patient not taking: Reported on 6/6/2024)     Family History       Problem Relation (Age of Onset)    Colon cancer Other    No Known Problems Mother, Father          Tobacco Use    Smoking status: Never    Smokeless tobacco: Never   Substance and Sexual Activity    Alcohol use: Never    Drug use: Yes     Types: Other-see comments     Comment: 5CBD-gummies for sleep    Sexual activity: Not Currently     Review of Systems   Constitutional: Negative for chills, decreased appetite, diaphoresis, fever, malaise/fatigue, weight gain and weight loss.   Cardiovascular:  Positive for chest pain. Negative for claudication, dyspnea on exertion, irregular heartbeat, leg swelling, near-syncope, orthopnea, palpitations and paroxysmal nocturnal dyspnea.   Respiratory:  Negative for cough, shortness of breath, snoring, sputum production and  wheezing.    Endocrine: Negative for cold intolerance, heat intolerance, polydipsia, polyphagia and polyuria.   Skin:  Negative for color change, dry skin, itching, nail changes and poor wound healing.   Musculoskeletal:  Negative for back pain, gout, joint pain and joint swelling.   Gastrointestinal:  Negative for bloating, abdominal pain, constipation, diarrhea, hematemesis, hematochezia, melena, nausea and vomiting.   Genitourinary:  Negative for dysuria, hematuria and nocturia.   Neurological:  Negative for dizziness, headaches, light-headedness, numbness, paresthesias and weakness.   Psychiatric/Behavioral:  Negative for altered mental status, depression and memory loss.      Objective:     Vital Signs (Most Recent):  Temp: 98 °F (36.7 °C) (10/09/24 0718)  Pulse: 64 (10/09/24 0718)  Resp: 20 (10/09/24 0718)  BP: (!) 147/78 (10/09/24 0718)  SpO2: 95 % (10/09/24 0718) Vital Signs (24h Range):  Temp:  [97.5 °F (36.4 °C)-98 °F (36.7 °C)] 98 °F (36.7 °C)  Pulse:  [49-70] 64  Resp:  [16-20] 20  SpO2:  [95 %-96 %] 95 %  BP: (115-147)/(55-78) 147/78     Weight: 72.6 kg (160 lb 0.9 oz)  Body mass index is 26.63 kg/m².    SpO2: 95 %       No intake or output data in the 24 hours ending 10/09/24 0843    Lines/Drains/Airways       Peripheral Intravenous Line  Duration                  Peripheral IV - Single Lumen 10/07/24 1450 18 G Anterior;Left;Proximal Forearm 1 day                     Physical Exam  Constitutional:       General: She is not in acute distress.     Appearance: She is well-developed.   Cardiovascular:      Rate and Rhythm: Normal rate and regular rhythm.      Heart sounds: No murmur heard.     No gallop.   Pulmonary:      Effort: Pulmonary effort is normal. No respiratory distress.      Breath sounds: Normal breath sounds. No wheezing.   Abdominal:      General: Bowel sounds are normal. There is no distension.      Palpations: Abdomen is soft.      Tenderness: There is no abdominal tenderness.   Skin:      General: Skin is warm and dry.   Neurological:      Mental Status: She is alert and oriented to person, place, and time.        Significant Labs: BMP:   Recent Labs   Lab 10/07/24  1149 10/08/24  0617 10/09/24  0546   * 124* 174*    139 137   K 4.1 4.0 4.0    111* 108   CO2 22* 21* 17*   BUN 17 15 21   CREATININE 0.8 0.8 0.8   CALCIUM 9.6 8.8 9.3   MG  --  2.1 1.8   , CBC   Recent Labs   Lab 10/07/24  1149 10/08/24  0617 10/09/24  0546   WBC 8.94 8.99 9.20   HGB 16.0 13.4 15.0   HCT 43.3 37.6 41.6    292 303   , and Troponin   Recent Labs   Lab 10/08/24  0220 10/08/24  0617 10/08/24  1218   TROPONINI <0.006 <0.006 <0.006       Significant Imaging: Echocardiogram: Transthoracic echo (TTE) complete (Cupid Only):   Results for orders placed or performed during the hospital encounter of 06/17/24   Echo   Result Value Ref Range    LVOT stroke volume 65.02 cm3    LVIDd 3.26 (A) 3.5 - 6.0 cm    LV Systolic Volume 10.67 mL    LVIDs 1.87 (A) 2.1 - 4.0 cm    LV Diastolic Volume 42.84 mL    IVS 1.17 (A) 0.6 - 1.1 cm    LVOT diameter 2.01 cm    LVOT area 3.2 cm2    FS 43 28 - 44 %    Left Ventricle Relative Wall Thickness 0.48 cm    PW 0.79 0.6 - 1.1 cm    LV mass 90.12 g    MV Peak E Juan Luis 0.68 m/s    TDI LATERAL 0.10 m/s    TDI SEPTAL 0.07 m/s    E/E' ratio 8.00 m/s    MV Peak A Juan Luis 0.73 m/s    E/A ratio 0.93     E wave deceleration time 170.47 msec    LV SEPTAL E/E' RATIO 9.71 m/s    LV LATERAL E/E' RATIO 6.80 m/s    PV Peak S Juan Luis 0.59 m/s    PV Peak D Juan Luis 0.40 m/s    Pulm vein S/D ratio 1.48     LVOT peak juan luis 0.83 m/s    Left Ventricular Outflow Tract Mean Velocity 0.59 cm/s    Left Ventricular Outflow Tract Mean Gradient 1.55 mmHg    RVDD 2.79 cm    RV S' 15.73 cm/s    TAPSE 2.14 cm    RV/LV Ratio 0.86 cm    LA size 3.06 cm    Left Atrium Major Axis 3.93 cm    RA Major Axis 4.66 cm    AV mean gradient 3 mmHg    AV peak gradient 7 mmHg    Ao peak juan luis 1.28 m/s    Ao VTI 31.10 cm    LVOT peak VTI 20.50  cm    AV valve area 2.09 cm²    AV Velocity Ratio 0.65     AV index (prosthetic) 0.66     HERMELINDO by Velocity Ratio 2.06 cm²    Mr max neeraj 4.77 m/s    MV stenosis pressure 1/2 time 48.57 ms    MV valve area p 1/2 method 4.53 cm2    PV PEAK VELOCITY 0.76 m/s    PV peak gradient 2 mmHg    Pulmonary Valve Mean Velocity 0.50 m/s    IVC diameter 1.68 cm    Mean e' 0.09 m/s    Est. RA pres 3 mmHg    Narrative      Left Ventricle: There is normal systolic function with a visually   estimated ejection fraction of 60 - 65%. There is normal diastolic   function.    Right Ventricle: Normal right ventricular cavity size. Systolic   function is normal.    Mitral Valve: There is mild regurgitation.    Tricuspid Valve: There is mild regurgitation.    IVC/SVC: Normal venous pressure at 3 mmHg.       Assessment and Plan:     * Chest pain  - presented with chest pain concerning for [place]  - troponin negative x 4; initial EKG with no acute changes; repeat EKG with ?anterior infarct   - on ASA therapy; given presentation and coronary CTA findings from earlier this year feel further evaluation needed with [facility] and will proceed today     Coronary artery disease involving native coronary artery of native heart with unstable angina pectoris  - seen previously by ABIMAEL Murphy FNP in [place]  - previous cardiac CTA with mid LAD 50-70%  - needs further evaluation with [facility]; further recommendations after [facility]     Mixed hyperlipidemia  - FLP with total cholesterol 281  HDL 40   - reports intolerance to statin therapy; deferred initiation of PCSK9 during office visit with VALENTINA Murphy NP  - discussed importance of  aggressive control of cholesterol needed and  discussed PCSK9 therapy and open to initiation- will order Repatha       Type 2 diabetes mellitus with diabetic polyneuropathy, with long-term current use of insulin  - HgbA1c 7.5 down from 10.4  - reinforced importance of aggressive BS control  - DM managed by primary team         VTE Risk  Mitigation (From admission, onward)           Ordered     heparin (porcine) injection 5,000 Units  Every 8 hours         10/08/24 2235     IP VTE HIGH RISK PATIENT  Once         10/08/24 1930     Place sequential compression device  Until discontinued         10/08/24 1930     Place MADALYN hose  Until discontinued         10/08/24 1930                    Thank you for your consult. I will follow-up with patient. Please contact us if you have any additional questions.    KENISHA Mancera, ANP  Cardiology   [place] - Med Surg

## 2024-10-09 NOTE — PLAN OF CARE
went to meet with patient. Patient reports she is independent and lives at home alone. She does not use any DME, but she does have a freestyle marleen monitor. Patient does not have Home Health. She still drives, but family will transport home at discharge. Her last PCP visit was at [email or web address] Patient is agreeable to follow-up with Cardiology here at [facility] if needed. Patient encouraged to call with any questions or concerns.  will continue to follow patient through transitions of care and assist with any discharge needs.     Other Contacts    Name Relation Home Work Mobile   tiffanie shah   [phone]   sp shah   [phone]     Future Appointments   Date Time Provider Department Center   11/27/2024  8:00 AM Chris Moss MD [facility]         10/09/24 1138   Discharge Assessment   Assessment Type Discharge Planning Assessment   Confirmed/corrected address, phone number and insurance Yes   Confirmed Demographics Correct on Facesheet   Source of Information patient   People in Home alone   Facility Arrived From: [facility]   Do you expect to return to your current living situation? Yes   Do you have help at home or someone to help you manage your care at home? Yes   Who are your caregiver(s) and their phone number(s)? tiffanie shah   [phone]  sp shah   [phone]   Prior to hospitilization cognitive status: Alert/Oriented   Current cognitive status: Alert/Oriented   Walking or Climbing Stairs Difficulty no   Dressing/Bathing Difficulty no   Equipment Currently Used at Home other (see comments)  (Pam Freestyle Monitor)   Do you take prescription medications? Yes   Do you have prescription coverage? Yes   Do you have any problems affording any of your prescribed medications? No   Who is going to help you get home at discharge? tiffanie shah   [phone]  sp shah    [phone]   How do you get to doctors appointments? car, drives self   Are you on dialysis? No   Do you take coumadin? No   Discharge Plan A Home   Discharge Plan B Home;Home Health   DME Needed Upon Discharge  none   Discharge Plan discussed with: Patient   Transition of Care Barriers None   Transportation Needs   Has the lack of transportation kept you from medical appointments, meetings, work or from getting things needed for daily living? No   Health Literacy   How often do you need to have someone help you when you read instructions, pamphlets, or other written material from your doctor or pharmacy? Never     Jasmin Castillo RN    [phone]

## 2024-10-09 NOTE — INTERVAL H&P NOTE
The patient has been examined and the H&P has been reviewed:    I concur with the findings and no changes have occurred since H&P was written.    Procedure risks, benefits and alternative options discussed and understood by patient/family.          Active Hospital Problems    Diagnosis  POA    *Chest pain [R07.9]  Yes    Coronary artery disease involving native coronary artery of native heart with unstable angina pectoris [I25.110]  Yes    Type 2 diabetes mellitus with diabetic polyneuropathy, with long-term current use of insulin [E11.42, Z79.4]  Not Applicable    Mixed hyperlipidemia [E78.2]  Yes      Resolved Hospital Problems   No resolved problems to display.

## 2024-10-09 NOTE — ASSESSMENT & PLAN NOTE
-patient complained of feeling like there is a heavy brick on her chest  -troponins negative  -cardiac monitor  -7/14/2024 CT angiogram of the coronary arteries demonstrated Diffuse atherosclerosis with long segment in mid LAD of 50-70% stenosis.Coronary calcium score 89.   -continue with aspirin, beta-blocker and isosorbide  -cardiology consult placed  -possible angiogram in a.m.

## 2024-10-09 NOTE — HPI
70yo female with DMII, HLP, chest pain, venous insufficiency, GERD, statin intolerance and murmur who was transferred from [facility] for further cardiac evaluation. She presented to the ER with complaints of chest pain for the past 2 days. She described the pain as pressure sensation as if a heavy brick was on her chest. She reports the pain started on Sunday and was rated as a  3-4 out of 10 and was associated with radiation to her arms and shoulder blades. The pain then increased to 8/10 the following day and presented to the ER for evaluation. Labs troponin negative x 4; EKG with no acute changes. CBC and BMP WNL. Admitted to [facility] Medicine and Cardiology consulted for evaluation of chest pain     Of note, she was seen earlier this year at [place] with CTA cardiac with calcium score 89 mid LAD 50-70% she opted not to proceed with further evaluation. Her cholesterol was also elevated at that time with intolerance to statin therapy and opted against PCSK9 therapy

## 2024-10-10 ENCOUNTER — ANESTHESIA EVENT (OUTPATIENT)
Dept: CARDIOLOGY | Facility: HOSPITAL | Age: 69
End: 2024-10-10
Payer: MEDICARE

## 2024-10-10 PROBLEM — I46.9 CARDIAC ARREST: Status: ACTIVE | Noted: 2024-10-10

## 2024-10-10 LAB
ALBUMIN SERPL BCP-MCNC: 4 G/DL (ref 3.5–5.2)
ALP SERPL-CCNC: 64 U/L (ref 55–135)
ALT SERPL W/O P-5'-P-CCNC: 60 U/L (ref 10–44)
ANION GAP SERPL CALC-SCNC: 11 MMOL/L (ref 8–16)
APICAL FOUR CHAMBER EJECTION FRACTION: 49 %
APICAL TWO CHAMBER EJECTION FRACTION: 48 %
ASCENDING AORTA: 2.68 CM
AST SERPL-CCNC: 43 U/L (ref 10–40)
AV INDEX (PROSTH): 0.7
AV MEAN GRADIENT: 3.6 MMHG
AV PEAK GRADIENT: 6.8 MMHG
AV VALVE AREA BY VELOCITY RATIO: 1.4 CM²
AV VALVE AREA: 1.8 CM²
AV VELOCITY RATIO: 0.54
BASOPHILS # BLD AUTO: 0.02 K/UL (ref 0–0.2)
BASOPHILS NFR BLD: 0.2 % (ref 0–1.9)
BILIRUB DIRECT SERPL-MCNC: 0.2 MG/DL (ref 0.1–0.3)
BILIRUB SERPL-MCNC: 0.5 MG/DL (ref 0.1–1)
BSA FOR ECHO PROCEDURE: 1.85 M2
BUN SERPL-MCNC: 16 MG/DL (ref 8–23)
CALCIUM SERPL-MCNC: 9.2 MG/DL (ref 8.7–10.5)
CHLORIDE SERPL-SCNC: 107 MMOL/L (ref 95–110)
CO2 SERPL-SCNC: 18 MMOL/L (ref 23–29)
CREAT SERPL-MCNC: 0.8 MG/DL (ref 0.5–1.4)
CV ECHO LV RWT: 0.53 CM
DIFFERENTIAL METHOD BLD: ABNORMAL
DOP CALC AO PEAK VEL: 1.3 M/S
DOP CALC AO VTI: 25 CM
DOP CALC LVOT AREA: 2.5 CM2
DOP CALC LVOT DIAMETER: 1.8 CM
DOP CALC LVOT PEAK VEL: 0.7 M/S
DOP CALC LVOT STROKE VOLUME: 44.5 CM3
DOP CALC MV VTI: 16.8 CM
DOP CALCLVOT PEAK VEL VTI: 17.5 CM
E WAVE DECELERATION TIME: 149.19 MSEC
E/A RATIO: 0.85
E/E' RATIO: 6.5 M/S
ECHO LV POSTERIOR WALL: 1 CM (ref 0.6–1.1)
EOSINOPHIL # BLD AUTO: 0 K/UL (ref 0–0.5)
EOSINOPHIL NFR BLD: 0 % (ref 0–8)
ERYTHROCYTE [DISTWIDTH] IN BLOOD BY AUTOMATED COUNT: 13.9 % (ref 11.5–14.5)
EST. GFR  (NO RACE VARIABLE): >60 ML/MIN/1.73 M^2
FRACTIONAL SHORTENING: 23.7 % (ref 28–44)
GLUCOSE SERPL-MCNC: 259 MG/DL (ref 70–110)
HCT VFR BLD AUTO: 38.9 % (ref 37–48.5)
HGB BLD-MCNC: 14.5 G/DL (ref 12–16)
IMM GRANULOCYTES # BLD AUTO: 0.08 K/UL (ref 0–0.04)
IMM GRANULOCYTES NFR BLD AUTO: 0.6 % (ref 0–0.5)
INTERVENTRICULAR SEPTUM: 0.7 CM (ref 0.6–1.1)
IVRT: 108.47 MSEC
LEFT ATRIUM AREA SYSTOLIC (APICAL 2 CHAMBER): 13.78 CM2
LEFT ATRIUM AREA SYSTOLIC (APICAL 4 CHAMBER): 13.42 CM2
LEFT ATRIUM SIZE: 2.59 CM
LEFT ATRIUM VOLUME INDEX MOD: 16.9 ML/M2
LEFT ATRIUM VOLUME MOD: 30.82 ML
LEFT INTERNAL DIMENSION IN SYSTOLE: 2.9 CM (ref 2.1–4)
LEFT VENTRICLE DIASTOLIC VOLUME INDEX: 34.65 ML/M2
LEFT VENTRICLE DIASTOLIC VOLUME: 63.07 ML
LEFT VENTRICLE END DIASTOLIC VOLUME APICAL 2 CHAMBER: 39.26 ML
LEFT VENTRICLE END DIASTOLIC VOLUME APICAL 4 CHAMBER: 38.67 ML
LEFT VENTRICLE END SYSTOLIC VOLUME APICAL 2 CHAMBER: 31.48 ML
LEFT VENTRICLE END SYSTOLIC VOLUME APICAL 4 CHAMBER: 29.98 ML
LEFT VENTRICLE MASS INDEX: 51.3 G/M2
LEFT VENTRICLE SYSTOLIC VOLUME INDEX: 17.7 ML/M2
LEFT VENTRICLE SYSTOLIC VOLUME: 32.15 ML
LEFT VENTRICULAR INTERNAL DIMENSION IN DIASTOLE: 3.8 CM (ref 3.5–6)
LEFT VENTRICULAR MASS: 93.4 G
LV LATERAL E/E' RATIO: 5.2 M/S
LV SEPTAL E/E' RATIO: 8.67 M/S
LVED V (TEICH): 63.07 ML
LVES V (TEICH): 32.15 ML
LVOT MG: 1.18 MMHG
LVOT MV: 0.51 CM/S
LYMPHOCYTES # BLD AUTO: 1.1 K/UL (ref 1–4.8)
LYMPHOCYTES NFR BLD: 8.7 % (ref 18–48)
MAGNESIUM SERPL-MCNC: 2.3 MG/DL (ref 1.6–2.6)
MCH RBC QN AUTO: 28.4 PG (ref 27–31)
MCHC RBC AUTO-ENTMCNC: 37.3 G/DL (ref 32–36)
MCV RBC AUTO: 76 FL (ref 82–98)
MONOCYTES # BLD AUTO: 0.1 K/UL (ref 0.3–1)
MONOCYTES NFR BLD: 0.6 % (ref 4–15)
MV MEAN GRADIENT: 1 MMHG
MV PEAK A VEL: 0.61 M/S
MV PEAK E VEL: 0.52 M/S
MV PEAK GRADIENT: 2 MMHG
MV VALVE AREA BY CONTINUITY EQUATION: 2.65 CM2
NEUTROPHILS # BLD AUTO: 11.1 K/UL (ref 1.8–7.7)
NEUTROPHILS NFR BLD: 89.9 % (ref 38–73)
NRBC BLD-RTO: 0 /100 WBC
OHS LV EJECTION FRACTION SIMPSONS BIPLANE MOD: 49 %
OHS QRS DURATION: 104 MS
OHS QTC CALCULATION: 503 MS
PHOSPHATE SERPL-MCNC: 4 MG/DL (ref 2.7–4.5)
PISA TR MAX VEL: 2.45 M/S
PLATELET # BLD AUTO: 276 K/UL (ref 150–450)
PMV BLD AUTO: 9.1 FL (ref 9.2–12.9)
POCT GLUCOSE: 236 MG/DL (ref 70–110)
POCT GLUCOSE: 260 MG/DL (ref 70–110)
POCT GLUCOSE: 284 MG/DL (ref 70–110)
POCT GLUCOSE: 297 MG/DL (ref 70–110)
POCT GLUCOSE: 302 MG/DL (ref 70–110)
POTASSIUM SERPL-SCNC: 4.3 MMOL/L (ref 3.5–5.1)
PROT SERPL-MCNC: 7.1 G/DL (ref 6–8.4)
PULM VEIN S/D RATIO: 1.22
PV PEAK D VEL: 0.32 M/S
PV PEAK S VEL: 0.39 M/S
RA VOL SYS: 23.03 ML
RBC # BLD AUTO: 5.1 M/UL (ref 4–5.4)
RIGHT ATRIAL AREA: 10.5 CM2
RIGHT ATRIUM VOLUME AREA LENGTH APICAL 4 CHAMBER: 22.56 ML
RIGHT VENTRICULAR END-DIASTOLIC DIMENSION: 2.42 CM
RV TISSUE DOPPLER FREE WALL SYSTOLIC VELOCITY 1 (APICAL 4 CHAMBER VIEW): 9.44 CM/S
SINUS: 2.7 CM
SODIUM SERPL-SCNC: 136 MMOL/L (ref 136–145)
STJ: 2.52 CM
TDI LATERAL: 0.1 M/S
TDI SEPTAL: 0.06 M/S
TDI: 0.08 M/S
TR MAX PG: 24 MMHG
TRICUSPID ANNULAR PLANE SYSTOLIC EXCURSION: 2.05 CM
TROPONIN I SERPL DL<=0.01 NG/ML-MCNC: 0.45 NG/ML (ref 0–0.03)
TROPONIN I SERPL DL<=0.01 NG/ML-MCNC: 0.81 NG/ML (ref 0–0.03)
WBC # BLD AUTO: 12.39 K/UL (ref 3.9–12.7)
Z-SCORE OF LEFT VENTRICULAR DIMENSION IN END DIASTOLE: -2.82
Z-SCORE OF LEFT VENTRICULAR DIMENSION IN END SYSTOLE: -0.56

## 2024-10-10 PROCEDURE — 83520 IMMUNOASSAY QUANT NOS NONAB: CPT

## 2024-10-10 PROCEDURE — 80076 HEPATIC FUNCTION PANEL: CPT | Performed by: STUDENT IN AN ORGANIZED HEALTH CARE EDUCATION/TRAINING PROGRAM

## 2024-10-10 PROCEDURE — 63600175 PHARM REV CODE 636 W HCPCS: Performed by: INTERNAL MEDICINE

## 2024-10-10 PROCEDURE — 83735 ASSAY OF MAGNESIUM: CPT | Performed by: INTERNAL MEDICINE

## 2024-10-10 PROCEDURE — 11000001 HC ACUTE MED/SURG PRIVATE ROOM

## 2024-10-10 PROCEDURE — 25000003 PHARM REV CODE 250: Performed by: INTERNAL MEDICINE

## 2024-10-10 PROCEDURE — 27000221 HC OXYGEN, UP TO 24 HOURS

## 2024-10-10 PROCEDURE — 84100 ASSAY OF PHOSPHORUS: CPT | Performed by: INTERNAL MEDICINE

## 2024-10-10 PROCEDURE — 99233 SBSQ HOSP IP/OBS HIGH 50: CPT | Mod: ,,, | Performed by: NURSE PRACTITIONER

## 2024-10-10 PROCEDURE — 25000003 PHARM REV CODE 250

## 2024-10-10 PROCEDURE — 25000003 PHARM REV CODE 250: Performed by: STUDENT IN AN ORGANIZED HEALTH CARE EDUCATION/TRAINING PROGRAM

## 2024-10-10 PROCEDURE — 99900035 HC TECH TIME PER 15 MIN (STAT)

## 2024-10-10 PROCEDURE — 84484 ASSAY OF TROPONIN QUANT: CPT

## 2024-10-10 PROCEDURE — 36415 COLL VENOUS BLD VENIPUNCTURE: CPT | Performed by: INTERNAL MEDICINE

## 2024-10-10 PROCEDURE — 94761 N-INVAS EAR/PLS OXIMETRY MLT: CPT

## 2024-10-10 PROCEDURE — 85025 COMPLETE CBC W/AUTO DIFF WBC: CPT | Performed by: INTERNAL MEDICINE

## 2024-10-10 PROCEDURE — 36415 COLL VENOUS BLD VENIPUNCTURE: CPT

## 2024-10-10 PROCEDURE — 80048 BASIC METABOLIC PNL TOTAL CA: CPT | Performed by: INTERNAL MEDICINE

## 2024-10-10 RX ORDER — IBUPROFEN 200 MG
24 TABLET ORAL
Status: DISCONTINUED | OUTPATIENT
Start: 2024-10-10 | End: 2024-10-13 | Stop reason: HOSPADM

## 2024-10-10 RX ORDER — INSULIN GLARGINE 100 [IU]/ML
15 INJECTION, SOLUTION SUBCUTANEOUS DAILY
Status: DISCONTINUED | OUTPATIENT
Start: 2024-10-10 | End: 2024-10-12

## 2024-10-10 RX ORDER — INSULIN ASPART 100 [IU]/ML
0-10 INJECTION, SOLUTION INTRAVENOUS; SUBCUTANEOUS
Status: DISCONTINUED | OUTPATIENT
Start: 2024-10-10 | End: 2024-10-13 | Stop reason: HOSPADM

## 2024-10-10 RX ORDER — GLUCAGON 1 MG
1 KIT INJECTION
Status: DISCONTINUED | OUTPATIENT
Start: 2024-10-10 | End: 2024-10-13 | Stop reason: HOSPADM

## 2024-10-10 RX ORDER — IBUPROFEN 200 MG
16 TABLET ORAL
Status: DISCONTINUED | OUTPATIENT
Start: 2024-10-10 | End: 2024-10-13 | Stop reason: HOSPADM

## 2024-10-10 RX ORDER — CLOPIDOGREL BISULFATE 75 MG/1
75 TABLET ORAL DAILY
Status: DISCONTINUED | OUTPATIENT
Start: 2024-10-10 | End: 2024-10-13 | Stop reason: HOSPADM

## 2024-10-10 RX ADMIN — INSULIN ASPART 6 UNITS: 100 INJECTION, SOLUTION INTRAVENOUS; SUBCUTANEOUS at 11:10

## 2024-10-10 RX ADMIN — ISOSORBIDE MONONITRATE 30 MG: 30 TABLET, EXTENDED RELEASE ORAL at 08:10

## 2024-10-10 RX ADMIN — HYPROMELLOSE 2910 1 DROP: 5 SOLUTION OPHTHALMIC at 09:10

## 2024-10-10 RX ADMIN — INSULIN GLARGINE 15 UNITS: 100 INJECTION, SOLUTION SUBCUTANEOUS at 08:10

## 2024-10-10 RX ADMIN — INSULIN ASPART 2 UNITS: 100 INJECTION, SOLUTION INTRAVENOUS; SUBCUTANEOUS at 06:10

## 2024-10-10 RX ADMIN — HEPARIN SODIUM 5000 UNITS: 5000 INJECTION INTRAVENOUS; SUBCUTANEOUS at 02:10

## 2024-10-10 RX ADMIN — INSULIN ASPART 3 UNITS: 100 INJECTION, SOLUTION INTRAVENOUS; SUBCUTANEOUS at 10:10

## 2024-10-10 RX ADMIN — ASPIRIN 81 MG: 81 TABLET, COATED ORAL at 06:10

## 2024-10-10 RX ADMIN — INSULIN ASPART 1 UNITS: 100 INJECTION, SOLUTION INTRAVENOUS; SUBCUTANEOUS at 12:10

## 2024-10-10 RX ADMIN — THERA TABS 1 TABLET: TAB at 08:10

## 2024-10-10 RX ADMIN — INSULIN ASPART 8 UNITS: 100 INJECTION, SOLUTION INTRAVENOUS; SUBCUTANEOUS at 06:10

## 2024-10-10 RX ADMIN — CLOPIDOGREL BISULFATE 75 MG: 75 TABLET ORAL at 09:10

## 2024-10-10 RX ADMIN — METOPROLOL TARTRATE 25 MG: 25 TABLET, FILM COATED ORAL at 08:10

## 2024-10-10 RX ADMIN — METOPROLOL TARTRATE 25 MG: 25 TABLET, FILM COATED ORAL at 09:10

## 2024-10-10 RX ADMIN — HEPARIN SODIUM 5000 UNITS: 5000 INJECTION INTRAVENOUS; SUBCUTANEOUS at 09:10

## 2024-10-10 NOTE — NURSING
Pt transferred from cath lab to . Pt intubated, sedated on propofol 50mcg, and on levophed 0.02mcg. Pt MAP dropped to 48, levo titrated up to 0.32mcg and propofol turned off per Izaiah YE. Pt able to be weaned off of levo and propofol titrated back up to 50mcg. Pt still agitated. 50mcg fentanyl ordered and given. Vasc band on r radial, no hematoma and pulses palpable. Labs ordered and collected

## 2024-10-10 NOTE — SUBJECTIVE & OBJECTIVE
Review of Systems   Constitutional: Negative for chills, decreased appetite, diaphoresis, fever, malaise/fatigue, weight gain and weight loss.   Cardiovascular:  Negative for chest pain, claudication, dyspnea on exertion, irregular heartbeat, leg swelling, near-syncope, orthopnea, palpitations and paroxysmal nocturnal dyspnea.   Respiratory:  Negative for cough, shortness of breath, snoring, sputum production and wheezing.    Endocrine: Negative for cold intolerance, heat intolerance, polydipsia, polyphagia and polyuria.   Skin:  Negative for color change, dry skin, itching, nail changes and poor wound healing.   Musculoskeletal:  Negative for back pain, gout, joint pain and joint swelling.   Gastrointestinal:  Negative for bloating, abdominal pain, constipation, diarrhea, hematemesis, hematochezia, melena, nausea and vomiting.   Genitourinary:  Negative for dysuria, hematuria and nocturia.   Neurological:  Negative for dizziness, headaches, light-headedness, numbness, paresthesias and weakness.   Psychiatric/Behavioral:  Negative for altered mental status, depression and memory loss.      Objective:     Vital Signs (Most Recent):  Temp: 98.3 °F (36.8 °C) (10/10/24 1100)  Pulse: 64 (10/10/24 1100)  Resp: (!) 21 (10/10/24 1115)  BP: (!) 102/55 (10/10/24 1030)  SpO2: 96 % (10/10/24 1100) Vital Signs (24h Range):  Temp:  [97.7 °F (36.5 °C)-98.6 °F (37 °C)] 98.3 °F (36.8 °C)  Pulse:  [57-89] 64  Resp:  [11-45] 21  SpO2:  [93 %-100 %] 96 %  BP: ()/(39-91) 102/55     Weight: 75.4 kg (166 lb 3.6 oz)  Body mass index is 27.66 kg/m².     SpO2: 96 %         Intake/Output Summary (Last 24 hours) at 10/10/2024 1333  Last data filed at 10/10/2024 0001  Gross per 24 hour   Intake 164.75 ml   Output 600 ml   Net -435.25 ml       Lines/Drains/Airways       Peripheral Intravenous Line  Duration                  Peripheral IV - Single Lumen 10/07/24 1450 18 G Anterior;Left;Proximal Forearm 2 days         Peripheral IV -  Single Lumen 10/09/24 1510 20 G Anterior;Left Wrist <1 day                       Physical Exam  Constitutional:       General: She is not in acute distress.     Appearance: She is well-developed.   Cardiovascular:      Rate and Rhythm: Normal rate and regular rhythm.      Heart sounds: No murmur heard.     No gallop.   Pulmonary:      Effort: Pulmonary effort is normal. No respiratory distress.      Breath sounds: Normal breath sounds. No wheezing.   Abdominal:      General: Bowel sounds are normal. There is no distension.      Palpations: Abdomen is soft.      Tenderness: There is no abdominal tenderness.   Skin:     General: Skin is warm and dry.   Neurological:      Mental Status: She is alert and oriented to person, place, and time.            Significant Labs: BMP:   Recent Labs   Lab 10/09/24  0546 10/09/24  1746 10/10/24  0228   * 195* 259*    136 136   K 4.0 3.9 4.3    105 107   CO2 17* 19* 18*   BUN 21 17 16   CREATININE 0.8 0.9 0.8   CALCIUM 9.3 8.9 9.2   MG 1.8 1.7 2.3   , CBC   Recent Labs   Lab 10/09/24  0546 10/09/24  1746 10/10/24  0228   WBC 9.20 16.13* 12.39   HGB 15.0 14.3 14.5   HCT 41.6 39.2 38.9    291 276   , and Troponin   Recent Labs   Lab 10/09/24  1746 10/10/24  0228 10/10/24  1055   TROPONINI 0.140* 0.808* 0.454*       Significant Imaging: Echocardiogram: Transthoracic echo (TTE) complete (Cupid Only):   Results for orders placed or performed during the hospital encounter of 10/08/24   Echo   Result Value Ref Range    Morin's Biplane MOD Ejection Fraction 49 %    A2C EF 48 %    A4C EF 49 %    LVOT stroke volume 44.5 cm3    LVIDd 3.8 3.5 - 6.0 cm    LV Systolic Volume 32.15 mL    LV Systolic Volume Index 17.7 mL/m2    LVIDs 2.9 2.1 - 4.0 cm    LV ESV A2C 31.48 mL    LV Diastolic Volume 63.07 mL    LV ESV A4C 29.98 mL    LV Diastolic Volume Index 34.65 mL/m2    LV EDV A2C [id number] mL    LV EDV A4C 38.67 mL    Left Ventricular End Systolic Volume by  Teichholz Method 32.15 mL    Left Ventricular End Diastolic Volume by Teichholz Method 63.07 mL    IVS 0.7 0.6 - 1.1 cm    LVOT diameter 1.8 cm    LVOT area 2.5 cm2    FS 23.7 (A) 28 - 44 %    Left Ventricle Relative Wall Thickness 0.53 cm    PW 1.0 0.6 - 1.1 cm    LV mass 93.4 g    LV Mass Index 51.3 g/m2    MV Peak E Juan Luis 0.52 m/s    TDI LATERAL 0.10 m/s    TDI SEPTAL 0.06 m/s    E/E' ratio 6.50 m/s    MV Peak A Juan Luis 0.61 m/s    TR Max Juan Luis 2.45 m/s    E/A ratio 0.85     IVRT 108.47 msec    E wave deceleration time 149.19 msec    LV SEPTAL E/E' RATIO 8.67 m/s    LV LATERAL E/E' RATIO 5.20 m/s    PV Peak S Juan Luis 0.39 m/s    PV Peak D Juan Luis 0.32 m/s    Pulm vein S/D ratio 1.22     LVOT peak juan luis 0.7 m/s    Left Ventricular Outflow Tract Mean Velocity 0.51 cm/s    Left Ventricular Outflow Tract Mean Gradient 1.18 mmHg    RV S' 9.44 cm/s    TAPSE 2.05 cm    LA size 2.59 cm    LA Vol (MOD) 30.82 mL    JESUS (MOD) 16.9 mL/m2    RA area length vol 22.56 mL    RA Area 10.5 cm2    RA Vol 23.03 mL    AV mean gradient 3.6 mmHg    AV peak gradient 6.8 mmHg    Ao peak juan luis 1.3 m/s    Ao VTI 25.0 cm    LVOT peak VTI 17.5 cm    AV valve area 1.8 cm²    AV Velocity Ratio 0.54     AV index (prosthetic) 0.70     HERMELINDO by Velocity Ratio 1.4 cm²    MV mean gradient 1 mmHg    MV peak gradient 2 mmHg    MV valve area by continuity eq 2.65 cm2    MV VTI 16.8 cm    Triscuspid Valve Regurgitation Peak Gradient 24 mmHg    Sinus 2.70 cm    STJ 2.52 cm    Ascending aorta 2.68 cm    Mean e' 0.08 m/s    ZLVIDS -0.56     ZLVIDD -2.82     LA area A4C 13.42 cm2    LA area A2C 13.78 cm2    RVDD 2.42 cm    BSA 1.85 m2    Narrative      Left Ventricle: Mild global hypokinesis present. There is low normal   systolic function with a visually estimated ejection fraction of 50 - 55%.    Right Ventricle: Normal right ventricular cavity size. Wall thickness   is normal. Systolic function is normal.    IVC/SVC: Patient is ventilated, cannot use IVC diameter to  estimate   right atrial pressure.

## 2024-10-10 NOTE — ASSESSMENT & PLAN NOTE
- FLP with total cholesterol 281  HDL 40   - reports intolerance to statin therapy; deferred initiation of PCSK9 during office visit with VALENTINA Murphy NP  - discussed importance of  aggressive control of cholesterol needed and  discussed PCSK9 therapy and open to initiation- attempted to order  Repatha but Praluent formulary on insurance therefore anticipate Praluent

## 2024-10-10 NOTE — PROGRESS NOTES
[facility] - Intensive [facility]  Cardiology  Progress Note    Patient Name: Terra Mcpherson  MRN: [id number]  Admission Date: 10/8/2024  Hospital Length of Stay: 1 days  Code Status: Full Code   Attending Physician: Kristan Santana MD   Primary Care Physician: Pola Machado MD  Expected Discharge Date: 10/8/2024  Principal Problem:Chest pain    Subjective:     Hospital Course:   10/9/2024 per HPI   [id number] [facility] yesterday with following results:   Left Main Coronary Artery: The left main is a large and short caliber vessel arising normally from the left sinus of valsalva.  It bifurcates into the left anterior descending and left circumflex coronary artery.  It is free of evidence of obstructive coronary artery disease. JUANITA III flow     Left Anterior Descending: The left anterior descending coronary artery is a large caliber vessel arising normally from the left main and extending to the apex.  It gives rise to  caliber diagonal arteries. Mid LAD with 50-60% stenosis, D3 with proximal 99% stenosis.       Left Circumflex: The left circumflex is a large caliber vessel arising normally from the left main coronary artery, it is co-dominant.  It gives rise to moderate caliber obtuse marginal branches. OM1 with 20-30% stenosis.  The circumflex system is free of obstructive coronary artery disease. JUANITA III flow     Right Coronary Artery: The right coronary artery is a large caliber vessel arising normally from the right sinus of valsalva.  It is co-dominant giving rise to a PDA and PLV branch.  Mid RCA with 30-40% stenosis. JUANITA III flow     LVgram: LVEDP 8, 14 mmHg     Intervention:   Runthrough advanced to distal LAD. After we advanced IVUS to LM and LAD without evidence of dissection, plaque rupture or damaged. Decided to stop and cool down the patient given event described below.     Patient after taking diagnostic angiogram images and while I was trying to engaged into LM, patient rapidly decompensated and became SOB,  ECG changes from SARAY to PEA arrest to VT. Received epi x 2, IV amio rosc achieved. Intubated in cath lab. Transferred to ICU on levo gtt is stable condition with resolution of ST changes. After long discussion with her son- he did mentioned she has multiple allergies and usually is prone to have bad allergic reaction. Unclear etiology of cardiac arrest.        Assessment:   Mid LAD 50-60% stenosis   Diagonal 3 with 95-99% stenosis angiographically  Cardiac Arrest unknown etiology- case stopped once patient was HD stable     Plan:   - Continue current medication regimen  - EKG when arrives to floor  - Routine post cath protocol  - Intubated/sedated - PCCM following  - IVF at  100cc/hr  for 3 hours  -Will stage iFR of mLAD and PCI of D3 of she is HD stable     Admitted to ICU post procedure. Extubated yesterday. HR and BP stable overnight. CBC and BMP WNL. Repeat troponin this AM 0.808. Echo post procedure with EF 50-55%        Review of Systems   Constitutional: Negative for chills, decreased appetite, diaphoresis, fever, malaise/fatigue, weight gain and weight loss.   Cardiovascular:  Negative for chest pain, claudication, dyspnea on exertion, irregular heartbeat, leg swelling, near-syncope, orthopnea, palpitations and paroxysmal nocturnal dyspnea.   Respiratory:  Negative for cough, shortness of breath, snoring, sputum production and wheezing.    Endocrine: Negative for cold intolerance, heat intolerance, polydipsia, polyphagia and polyuria.   Skin:  Negative for color change, dry skin, itching, nail changes and poor wound healing.   Musculoskeletal:  Negative for back pain, gout, joint pain and joint swelling.   Gastrointestinal:  Negative for bloating, abdominal pain, constipation, diarrhea, hematemesis, hematochezia, melena, nausea and vomiting.   Genitourinary:  Negative for dysuria, hematuria and nocturia.   Neurological:  Negative for dizziness, headaches, light-headedness, numbness, paresthesias and  weakness.   Psychiatric/Behavioral:  Negative for altered mental status, depression and memory loss.      Objective:     Vital Signs (Most Recent):  Temp: 98.3 °F (36.8 °C) (10/10/24 1100)  Pulse: 64 (10/10/24 1100)  Resp: (!) 21 (10/10/24 1115)  BP: (!) 102/55 (10/10/24 1030)  SpO2: 96 % (10/10/24 1100) Vital Signs (24h Range):  Temp:  [97.7 °F (36.5 °C)-98.6 °F (37 °C)] 98.3 °F (36.8 °C)  Pulse:  [57-89] 64  Resp:  [11-45] 21  SpO2:  [93 %-100 %] 96 %  BP: ()/(39-91) 102/55     Weight: 75.4 kg (166 lb 3.6 oz)  Body mass index is 27.66 kg/m².     SpO2: 96 %         Intake/Output Summary (Last 24 hours) at 10/10/2024 1333  Last data filed at 10/10/2024 0001  Gross per 24 hour   Intake 164.75 ml   Output 600 ml   Net -435.25 ml       Lines/Drains/Airways       Peripheral Intravenous Line  Duration                  Peripheral IV - Single Lumen 10/07/24 1450 18 G Anterior;Left;Proximal Forearm 2 days         Peripheral IV - Single Lumen 10/09/24 1510 20 G Anterior;Left Wrist <1 day                       Physical Exam  Constitutional:       General: She is not in acute distress.     Appearance: She is well-developed.   Cardiovascular:      Rate and Rhythm: Normal rate and regular rhythm.      Heart sounds: No murmur heard.     No gallop.   Pulmonary:      Effort: Pulmonary effort is normal. No respiratory distress.      Breath sounds: Normal breath sounds. No wheezing.   Abdominal:      General: Bowel sounds are normal. There is no distension.      Palpations: Abdomen is soft.      Tenderness: There is no abdominal tenderness.   Skin:     General: Skin is warm and dry.   Neurological:      Mental Status: She is alert and oriented to person, place, and time.            Significant Labs: BMP:   Recent Labs   Lab 10/09/24  0546 10/09/24  1746 10/10/24  0228   * 195* 259*    136 136   K 4.0 3.9 4.3    105 107   CO2 17* 19* 18*   BUN 21 17 16   CREATININE 0.8 0.9 0.8   CALCIUM 9.3 8.9 9.2   MG 1.8  1.7 2.3   , CBC   Recent Labs   Lab 10/09/24  0546 10/09/24  1746 10/10/24  0228   WBC 9.20 16.13* 12.39   HGB 15.0 14.3 14.5   HCT 41.6 39.2 38.9    291 276   , and Troponin   Recent Labs   Lab 10/09/24  1746 10/10/24  0228 10/10/24  1055   TROPONINI 0.140* 0.808* 0.454*       Significant Imaging: Echocardiogram: Transthoracic echo (TTE) complete (Cupid Only):   Results for orders placed or performed during the hospital encounter of 10/08/24   Echo   Result Value Ref Range    Morin's Biplane MOD Ejection Fraction 49 %    A2C EF 48 %    A4C EF 49 %    LVOT stroke volume 44.5 cm3    LVIDd 3.8 3.5 - 6.0 cm    LV Systolic Volume 32.15 mL    LV Systolic Volume Index 17.7 mL/m2    LVIDs 2.9 2.1 - 4.0 cm    LV ESV A2C 31.48 mL    LV Diastolic Volume 63.07 mL    LV ESV A4C 29.98 mL    LV Diastolic Volume Index 34.65 mL/m2    LV EDV A2C [id number] mL    LV EDV A4C 38.67 mL    Left Ventricular End Systolic Volume by Teichholz Method 32.15 mL    Left Ventricular End Diastolic Volume by Teichholz Method 63.07 mL    IVS 0.7 0.6 - 1.1 cm    LVOT diameter 1.8 cm    LVOT area 2.5 cm2    FS 23.7 (A) 28 - 44 %    Left Ventricle Relative Wall Thickness 0.53 cm    PW 1.0 0.6 - 1.1 cm    LV mass 93.4 g    LV Mass Index 51.3 g/m2    MV Peak E Juan Luis 0.52 m/s    TDI LATERAL 0.10 m/s    TDI SEPTAL 0.06 m/s    E/E' ratio 6.50 m/s    MV Peak A Juan Luis 0.61 m/s    TR Max Juan Luis 2.45 m/s    E/A ratio 0.85     IVRT 108.47 msec    E wave deceleration time 149.19 msec    LV SEPTAL E/E' RATIO 8.67 m/s    LV LATERAL E/E' RATIO 5.20 m/s    PV Peak S Juan Luis 0.39 m/s    PV Peak D Juan Luis 0.32 m/s    Pulm vein S/D ratio 1.22     LVOT peak juan luis 0.7 m/s    Left Ventricular Outflow Tract Mean Velocity 0.51 cm/s    Left Ventricular Outflow Tract Mean Gradient 1.18 mmHg    RV S' 9.44 cm/s    TAPSE 2.05 cm    LA size 2.59 cm    LA Vol (MOD) 30.82 mL    JESUS (MOD) 16.9 mL/m2    RA area length vol 22.56 mL    RA Area 10.5 cm2    RA Vol 23.03 mL    AV mean  gradient 3.6 mmHg    AV peak gradient 6.8 mmHg    Ao peak neeraj 1.3 m/s    Ao VTI 25.0 cm    LVOT peak VTI 17.5 cm    AV valve area 1.8 cm²    AV Velocity Ratio 0.54     AV index (prosthetic) 0.70     HERMELINDO by Velocity Ratio 1.4 cm²    MV mean gradient 1 mmHg    MV peak gradient 2 mmHg    MV valve area by continuity eq 2.65 cm2    MV VTI 16.8 cm    Triscuspid Valve Regurgitation Peak Gradient 24 mmHg    Sinus 2.70 cm    STJ 2.52 cm    Ascending aorta 2.68 cm    Mean e' 0.08 m/s    ZLVIDS -0.56     ZLVIDD -2.82     LA area A4C 13.42 cm2    LA area A2C 13.78 cm2    RVDD 2.42 cm    BSA 1.85 m2    Narrative      Left Ventricle: Mild global hypokinesis present. There is low normal   systolic function with a visually estimated ejection fraction of 50 - 55%.    Right Ventricle: Normal right ventricular cavity size. Wall thickness   is normal. Systolic function is normal.    IVC/SVC: Patient is ventilated, cannot use IVC diameter to estimate   right atrial pressure.       Assessment and Plan:     Brief HPI: Seen on AM NP rounds while resting in bed. Complains of chest pain likely related to recent chest compressions.Discussed POC as detailed below-verbalized understanding and agrees with POC      * Chest pain  - presented with chest pain concerning for USA; [facility] yesterday with details as detailed under CAD; current complaint of chest pain this AM likely related to chest compressions   - troponin negative x 4; initial EKG with no acute changes; repeat EKG with ?anterior infarct   - continue Imdur, BB and ASA     Cardiac arrest  - [facility] yesterday for evaluation of USA complicated by cardiac arrest- PEA then asystole with ROSC after CPR and Epi  - uncertain of etiology of arrest ?spasm vs allergic reaction to iodine; no arrhythmias noted overnight  - echo with normal LVEF post procedure; troponin 0.808 post procedure trending up expectedly given arrest  - continue to monitor on telemetry     Coronary artery disease involving native  coronary artery of native heart with unstable angina pectoris  - seen previously by ABIMAEL AARONP in [place]  - previous cardiac CTA with mid LAD 50-70%; LHC yesterday with mLAD 50-60%, D3 99%, OM 20%, RCA 30-40% planned for iFR to LAD and D3 but deferred due to acute issues periprocedural  - VSS currently; chest pain related to chest compressions; will plan for continued medical management and will likely plan to defer further CAD evaluation to outpatient setting      Mixed hyperlipidemia  - FLP with total cholesterol 281  HDL 40   - reports intolerance to statin therapy; deferred initiation of PCSK9 during office visit with VALENTINA Murphy NP  - discussed importance of  aggressive control of cholesterol needed and  discussed PCSK9 therapy and open to initiation- attempted to order  Repatha but Praluent formulary on insurance therefore anticipate Praluent       Type 2 diabetes mellitus with diabetic polyneuropathy, with long-term current use of insulin  - HgbA1c 7.5 down from 10.4  - reinforced importance of aggressive BS control  - DM managed by primary team         VTE Risk Mitigation (From admission, onward)           Ordered     heparin (porcine) injection 5,000 Units  Every 8 hours         10/08/24 2235     IP VTE HIGH RISK PATIENT  Once         10/08/24 1930     Place sequential compression device  Until discontinued         10/08/24 1930     Place MADALYN hose  Until discontinued         10/08/24 1930                    KENISHA Mancera, ANP  Cardiology  [facility] - Intensive Care

## 2024-10-10 NOTE — PROGRESS NOTES
Plan of Care:    70 yo woman with a history of HLD, CAD, GERD, and G2DM who presented for chest pain. She was taken for cardiac cath on 10/9 and had a cardiac arrest. Per report, patient with PEA arrest, received Epi x2, then developed Vtach and was shocked x1. ROSC achieved. Transferred to the ICU post-arrest intubated and sedated.     On arrival, patient with MAPs around 48, NE uptitrated and propofol held due to persistent hypotension. Patient then became agitated and hypertensive with SBP 190s. Sedation restarted.     Reviewed patient's labs, mild elevation in LFTs and T bili 1.2, WBC 16, bicarb 19. pH 7.27/39.    Patient become progressively more agitated, able to follow commands. NE down to 0.02. Adequate tidal volumes on PS 5/5. Patient extubated to NC. Resting comfortably in bed, answering questions, lungs clear to auscultation.     Unclear etiology of cardiac arrest. TTE pending. Concerned for possible reaction to medication (has several allergies). Tryptase pending.     Beverley Bliss MD  PCCM Fellow

## 2024-10-10 NOTE — ASSESSMENT & PLAN NOTE
10/9 during [facility] patient had PEA , 2 cycles of Epi, and DC shock , patient has achieved ROSC was intubated and admitted to ICU overnight on Levophed drip.  On 10/10 patient was successfully extubated of drips and was downgraded to telemedicine

## 2024-10-10 NOTE — ASSESSMENT & PLAN NOTE
Patient with known CAD with no intervention based on patient's wishes, which is uncontrolled Will continue ASA, Plavix, and Statin and monitor for S/Sx of angina/ACS. Continue to monitor on telemetry.     [facility] 10/9  Mid LAD 50-60% stenosis   Diagonal 3 with 95-99% stenosis angiographically  Cardiac Arrest unknown etiology- case stopped once patient was HD stable     Plan PER CARDIOLOGY:   - Continue current medication regimen  - EKG when arrives to floor  - Routine post cath protocol  - Intubated/sedated - PCCM following  - IVF at  100cc/hr  for 3 hours  -planning to state iFR of mLAD and PCI of D3 of she is HD stable

## 2024-10-10 NOTE — PROGRESS NOTES
"[facility]  Progress Note    Patient Name: Terra Mcpherson  MRN: [id number]  Patient Class: IP- Inpatient   Admission Date: 10/8/2024  Length of Stay: 1 days  Attending Physician: Kristan Santana MD  Primary Care Provider: Pola Machado MD        Subjective:     Principal Problem:Chest pain        HPI:  The patient was a 69-year-old female with a past medical history of diabetes, hyperlipidemia, CAD, GERD and cataracts.  The patient was transferred from [place] for cardiac evaluation.  She presented to the hospital due to retrosternal chest pain that had lasted 2 days.  The patient was describes the pain as feeling like "a heavy brick is sitting on my chest".  She states the pain started on Sunday and was between a 3-4/10.  She described it as radiating to her bilateral upper arms, shoulders, armpit, behind her ears and between her shoulder blades.  She stated by today, the pain was 8/10.  She had a similar episode in July and had a CT angiogram done. Results showed: CAD-RADS 2. Diffuse atherosclerosis with long segment in mid LAD of 50-70% stenosis. Coronary calcium score 89.  She was continued on baby aspirin.  At the time she declined medical interventional therapy.  The patient had a left heart catheterization done about 20 years ago which was negative.  Due to her current symptoms she was transferred to [facility] for evaluation and angiogram by cardiology team.    On arrival, the patient was vitals has been stable.  Troponin is within normal limits but lipid panel elevated. EKG showed NSR rate 69, normal EKG, QTc 435 ms.  Patient was to be admitted to telemetry for further evaluation by Cardiology.    Overview/Hospital Course:  On 10/09 patient was taken to cardiac cath for [facility], patient rapidly decompensated and became shortness of breath EKG changes from SD to PA arrest 2 PT, patient received epi twice with IV amiodarone Ross was achieved patient intubated in " the cath lab transferred to ICU started on Levophed drip with resolution of ST changes after Cardiology discussion with her son mentioned that the patient has multiple allergies unusually prone to have bad allergic reaction is any clear cause of the cardiac arrest initial assessment or LHC:  Assessment:   Mid LAD 50-60% stenosis   Diagonal 3 with 95-99% stenosis angiographically  Cardiac Arrest unknown etiology- case stopped once patient was HD stable  Cardiology planning for staging IFR of mLAD of D3 once she is hemodynamically stable         Interval History:  Patient was seen in the morning on 10/10 after extubation on nasal cannula denies any chest pain no nausea or vomiting or diarrhea patient was recovering in the intensive care unit is ready to be downgraded to the telemedicine unit after discontinuation of GTT    Review of Systems   Constitutional:  Negative for activity change, chills and fever.   HENT:  Negative for congestion, rhinorrhea and sore throat.    Eyes:  Negative for discharge and redness.   Respiratory:  Negative for cough, chest tightness, shortness of breath and wheezing.    Cardiovascular:  Negative for chest pain, palpitations and leg swelling.   Gastrointestinal:  Negative for abdominal pain, constipation, diarrhea, nausea and vomiting.   Genitourinary:  Negative for dysuria, flank pain and hematuria.   Musculoskeletal:  Negative for back pain, myalgias and neck pain.        Pain in upper arms, bilateral shoulders, between shoulder blades and armpit   Skin:  Negative for pallor, rash and wound.   Neurological:  Negative for dizziness, tremors, syncope, weakness, numbness and headaches.   Psychiatric/Behavioral:  Negative for agitation, confusion and hallucinations.      Objective:     Vital Signs (Most Recent):  Temp: 98 °F (36.7 °C) (10/10/24 1615)  Pulse: 62 (10/10/24 1445)  Resp: (!) 23 (10/10/24 1445)  BP: (!) 101/54 (10/10/24 1400)  SpO2: 97 % (10/10/24 1445) Vital Signs (24h  Range):  Temp:  [97.7 °F (36.5 °C)-98.6 °F (37 °C)] 98 °F (36.7 °C)  Pulse:  [57-89] 62  Resp:  [11-45] 23  SpO2:  [93 %-100 %] 97 %  BP: ()/(39-91) 101/54     Weight: 75.4 kg (166 lb 3.6 oz)  Body mass index is 27.66 kg/m².    Intake/Output Summary (Last 24 hours) at 10/10/2024 1707  Last data filed at 10/10/2024 0001  Gross per 24 hour   Intake 164.75 ml   Output 600 ml   Net -435.25 ml         Physical Exam  Vitals and nursing note reviewed.   Constitutional:       General: She is not in acute distress.  HENT:      Head: Normocephalic and atraumatic.      Mouth/Throat:      Mouth: Mucous membranes are moist.   Eyes:      General:         Right eye: No discharge.         Left eye: No discharge.      Conjunctiva/sclera: Conjunctivae normal.   Cardiovascular:      Rate and Rhythm: Normal rate and regular rhythm.      Pulses: Normal pulses.   Pulmonary:      Effort: Pulmonary effort is normal.      Breath sounds: Normal breath sounds.   Abdominal:      General: Bowel sounds are normal.      Palpations: Abdomen is soft.   Musculoskeletal:         General: No swelling. Normal range of motion.      Cervical back: Normal range of motion and neck supple.   Skin:     General: Skin is warm and dry.   Neurological:      Mental Status: She is alert and oriented to person, place, and time. Mental status is at baseline.   Psychiatric:         Mood and Affect: Mood normal.             Significant Labs: All pertinent labs within the past 24 hours have been reviewed.  Recent Lab Results  (Last 5 results in the past 24 hours)        10/10/24  1107   10/10/24  1055   10/10/24  0606   10/10/24  0228   10/09/24  2356        A2C EF               A4C EF               Albumin       4.0         ALP       64         ALT       60         Anion Gap       11         Ascending aorta               Ao peak neeraj               Ao VTI               AST       43         AV valve area               HERMELINDO by Velocity Ratio               AV mean  gradient               AV index (prosthetic)               AV peak gradient               AV Velocity Ratio               Baso #       0.02         Basophil %       0.2         Bilirubin Direct       0.2         BILIRUBIN TOTAL       0.5  Comment: For infants and newborns, interpretation of results should be based  on gestational age, weight and in agreement with clinical  observations.    Premature Infant recommended reference ranges:  Up to 24 hours.............<8.0 mg/dL  Up to 48 hours............<12.0 mg/dL  3-5 days..................<15.0 mg/dL  6-29 days.................<15.0 mg/dL           BSA               BUN       16         Calcium       9.2         Chloride       107         CO2       18         Creatinine       0.8         Left Ventricle Relative Wall Thickness               Differential Method       Automated         E/A ratio               E/E' ratio               eGFR       >60         Eos #       0.0         Eos %       0.0         E wave deceleration time               FS               Glucose       259         Gran # (ANC)       11.1         Gran %       89.9         Hematocrit       38.9         Hemoglobin       14.5         Immature Grans (Abs)       0.08  Comment: Mild elevation in immature granulocytes is non specific and   can be seen in a variety of conditions including stress response,   acute inflammation, trauma and pregnancy. Correlation with other   laboratory and clinical findings is essential.           Immature Granulocytes       0.6         IVRT               IVSd               LA area A2C               LA area A4C               LA size               LA Vol               JESUS (MOD)               LVOT area               LV LATERAL E/E' RATIO               LV SEPTAL E/E' RATIO               LV EDV BP               LV Diastolic Volume Index               Left Ventricular End Diastolic Volume by Teichholz Method               LV EDV A2C               LV EDV A4C               Left  Ventricular End Systolic Volume by Teichholz Method               LV ESV A2C               LV ESV A4C               LVIDd               LVIDs               LV mass               LV Mass Index               Left Ventricular Outflow Tract Mean Gradient               Left Ventricular Outflow Tract Mean Velocity               LVOT diameter               LVOT peak juan luis               LVOT stroke volume               LVOT peak VTI               LV ESV BP               LV Systolic Volume Index               Lymph #       1.1         Lymph %       8.7         Magnesium        2.3         MCH       28.4         MCHC       37.3         MCV       76         Mean e'               Mono #       0.1         Mono %       0.6         MPV       9.1         MV valve area by continuity eq               MV mean gradient               MV peak gradient               MV Peak A Juan Luis               MV Peak E Juan Luis               MV VTI               nRBC       0         Morin's Biplane MOD Ejection Fraction               Phosphorus Level       4.0         Platelet Count       276         POCT Glucose 284     236     248       Potassium       4.3         PROTEIN TOTAL       7.1         PV Peak D Juan Luis               PV Peak S Juan Luis               Pulm vein S/D ratio               PW               RA Vol               RA Area               RA area length vol               RBC       5.10         RDW       13.9         RV S'               RVDD               Sinus               Sodium       136         STJ               TAPSE               TDI SEPTAL               TDI LATERAL               Triscuspid Valve Regurgitation Peak Gradient               TR Max Juan Luis               Troponin I   0.454  Comment: The reference interval for Troponin I represents the 99th percentile   cutoff   for our facility and is consistent with 3rd generation assay   performance.       0.808  Comment: The reference interval for Troponin I represents the 99th percentile   cutoff   for  our facility and is consistent with 3rd generation assay   performance.           WBC       12.39         ZLVIDD               ZLVIDS                                      Significant Imaging: I have reviewed all pertinent imaging results/findings within the past 24 hours.    Assessment/Plan:      * Chest pain  -patient complained of feeling like there is a heavy brick on her chest  -troponins negative  -cardiac monitor  -7/14/2024 CT angiogram of the coronary arteries demonstrated Diffuse atherosclerosis with long segment in mid LAD of 50-70% stenosis.Coronary calcium score 89.   -continue with aspirin, beta-blocker and isosorbide  -cardiology consult placed  -patient is scheduled for LHC today 10/9, patient crashed during the procedure with PEA after 2 epi, and and DC shock, patient achieved rods patient was intubated for to ICU for overnight, patient extubated on 10/10 of drips patient was downgraded telemedicine not sure what could cause the cardiac arrest.  Cardiology planning for Staging iFR of mLAD and PCI of D3 of she is HD stable      Cardiac arrest  10/9 during LHC patient had PEA , 2 cycles of Epi, and DC shock , patient has achieved ROSC was intubated and admitted to ICU overnight on Levophed drip.  On 10/10 patient was successfully extubated of drips and was downgraded to telemedicine    Coronary artery disease involving native coronary artery of native heart with unstable angina pectoris  Patient with known CAD with no intervention based on patient's wishes, which is uncontrolled Will continue ASA, Plavix, and Statin and monitor for S/Sx of angina/ACS. Continue to monitor on telemetry.     [facility] [place] 10/9  Mid LAD 50-60% stenosis   Diagonal 3 with 95-99% stenosis angiographically  Cardiac Arrest unknown etiology- case stopped once patient was HD stable     Plan PER CARDIOLOGY:   - Continue current medication regimen  - EKG when arrives to floor  - Routine post cath protocol  - Intubated/sedated - PCCM  following  - IVF at  100cc/hr  for 3 hours  -planning to state iFR of mLAD and PCI of D3 of she is HD stable    Mixed hyperlipidemia  -continue with home meds      Type 2 diabetes mellitus with diabetic polyneuropathy, with long-term current use of insulin  -continue with sliding scale as patient is NPO  -glucometer checks  -A1c 7.5        VTE Risk Mitigation (From admission, onward)           Ordered     heparin (porcine) injection 5,000 Units  Every 8 hours         10/08/24 2235     IP VTE HIGH RISK PATIENT  Once         10/08/24 1930     Place sequential compression device  Until discontinued         10/08/24 1930     Place MADALYN hose  Until discontinued         10/08/24 1930                    Discharge Planning   ALFREDA: 10/8/2024     Code Status: Full Code   Is the patient medically ready for discharge?:     Reason for patient still in hospital (select all that apply): Patient unstable  Discharge Plan A: Home            Critical care time spent on the evaluation and treatment of severe organ dysfunction, review of pertinent labs and imaging studies, discussions with consulting providers and discussions with patient/family: 45 minutes.      Kristan Wright MD  Department of Hospital Medicine   [facility] - Intensive Care

## 2024-10-10 NOTE — HOSPITAL COURSE
On 10/09 patient was taken to cardiac cath for LHC, patient rapidly decompensated and became shortness of breath EKG changes from SD to PA arrest 2 PT, patient received epi twice with IV amiodarone Ross was achieved patient intubated in the cath lab transferred to ICU started on Levophed drip with resolution of ST changes after Cardiology discussion with her son mentioned that the patient has multiple allergies unusually prone to have bad allergic reaction is any clear cause of the cardiac arrest initial assessment or LHC:  Assessment:   Mid LAD 50-60% stenosis   Diagonal 3 with 95-99% stenosis angiographically  Cardiac Arrest unknown etiology- case stopped once patient was HD stable  Cardiology planning for staging IFR of mLAD of D3 once she is hemodynamically stable

## 2024-10-10 NOTE — PROGRESS NOTES
I saw and evaluated the patient. I have reviewed and agree with the residents findings, including all diagnostic interpretations, and plans as written. This is an attestation of a separate note written by the ICU resident today.  As the teaching physician, I was present for and have confirmed the key portions of the service performed by the resident today.  In addition to the resident's note, I add the following:    Problems:  Cardiac Arrest  Chest Pain     Plan:  Unclear etiology of arrest. Hemodynamically stable. Able to be extubated  If this was truly a reaction to contrast, should have formal allergy testing  DVT ppx: lovenox  GI ppx: n/a  Code status: Full  Dispo: Floor     40 minutes of critical care time was spent in the critical care management of the patient. This included management of organ failures related to critical illness, titration of continuous infusions, management of mechanical ventilation, review of pertinent labs and imaging studies, discussion of the patient with consulting services, and discussion of the patients condition with the patient and family.      Jose Garcia MD  [facility]

## 2024-10-10 NOTE — BRIEF OP NOTE
[facility] - Intensive Care  Surgery Department  Operative Note    SUMMARY   POST CATH NOTE    Date of Procedure: 10/9/2024     Procedure: Procedure(s) (LRB):  Left heart cath (Left)  IVUS, Coronary     Surgeons and Role:     * Jesus Gurrola MD - Primary     * Morris Kevin MD - Assisting        Pre-Operative Diagnosis: Chest pain [R07.9]    Post-Operative Diagnosis: Post-Op Diagnosis Codes:     * Chest pain [R07.9]    s/p catheterization secondary to: Chest Pain    Cath Results:  Access: US guided right radial    Coronary Anatomy:     Left Main Coronary Artery: The left main is a large and short caliber vessel arising normally from the left sinus of valsalva.  It bifurcates into the left anterior descending and left circumflex coronary artery.  It is free of evidence of obstructive coronary artery disease. JUANITA III flow     Left Anterior Descending: The left anterior descending coronary artery is a large caliber vessel arising normally from the left main and extending to the apex.  It gives rise to  caliber diagonal arteries. Mid LAD with 50-60% stenosis, D3 with proximal 99% stenosis.       Left Circumflex: The left circumflex is a large caliber vessel arising normally from the left main coronary artery, it is co-dominant.  It gives rise to moderate caliber obtuse marginal branches. OM1 with 20-30% stenosis.  The circumflex system is free of obstructive coronary artery disease. JUANITA III flow     Right Coronary Artery: The right coronary artery is a large caliber vessel arising normally from the right sinus of valsalva.  It is co-dominant giving rise to a PDA and PLV branch.  Mid RCA with 30-40% stenosis. JUANITA III flow    LVgram: LVEDP 8, 14 mmHg    Intervention:   Runthrough advanced to distal LAD. After we advanced IVUS to LM and LAD without evidence of dissection, plaque rupture or damaged. Decided to stop and cool down the patient given event described below.     Closure device: Vasc band    Patient  "after taking diagnostic angiogram images and while I was trying to engaged into LM, patient rapidly decompensated and became SOB, ECG changes from SARAY to PEA arrest to VT. Received epi x 2, IV amio rosc achieved. Intubated in cath lab. Transferred to ICU on levo gtt is stable condition with resolution of ST changes. After long discussion with her son- he did mentioned she has multiple allergies and usually is prone to have bad allergic reaction. Unclear etiology of cardiac arrest.     Post Cath Exam:  BP (!) 108/59   Pulse 66   Temp 98.4 °F (36.9 °C) (Oral)   Resp 16   Ht 5' 5" (1.651 m)   Wt 74.8 kg (165 lb)   SpO2 97%   BMI 27.46 kg/m²     Anesthesia: RN IV Sedation      Estimated Blood Loss (EBL): * No values recorded between 10/9/2024  3:42 PM and 10/9/2024  5:00 PM *           Specimens:   Specimen (24h ago, onward)      None            Assessment:   Mid LAD 50-60% stenosis   Diagonal 3 with 95-99% stenosis angiographically  Cardiac Arrest unknown etiology- case stopped once patient was HD stable    Plan:   - Continue current medication regimen  - EKG when arrives to floor  - Routine post cath protocol  - Intubated/sedated - PCCM following  - IVF at  100cc/hr  for 3 hours  -Will stage iFR of mLAD and PCI of D3 of she is HD stable        "

## 2024-10-10 NOTE — ASSESSMENT & PLAN NOTE
- seen previously by ABIMAEL RODARTE in [place]  - previous cardiac CTA with mid LAD 50-70%; LHC yesterday with mLAD 50-60%, D3 99%, OM 20%, RCA 30-40% planned for iFR to LAD and D3 but deferred due to acute issues periprocedural  - VSS currently; chest pain related to chest compressions; will plan for continued medical management and will likely plan to defer further CAD evaluation to outpatient setting

## 2024-10-10 NOTE — ASSESSMENT & PLAN NOTE
- presented with chest pain concerning for [place]; [facility] yesterday with details as detailed under CAD; current complaint of chest pain this AM likely related to chest compressions   - troponin negative x 4; initial EKG with no acute changes; repeat EKG with ?anterior infarct   - continue Imdur, BB and ASA

## 2024-10-10 NOTE — PROGRESS NOTES
[facility] Consult Note    Subjective:      History of Present Illness:  Terra Mcpherson is a 69 y.o. woman with a history of T2DM, CAD and HLD who was admitted for unstable angina. Underwent [facility] on 10/9 that was complicated by cardiac arrest. Per report, patient with PEA arrest, received Epi x2, then developed Vtach and was shocked x1. ROSC achieved. Transferred to the ICU post-arrest intubated and sedated. Patient awake and following commands off of sedation, agitated and hypertensive and was extubated evening of 10/10. She did well post-extubation overnight.     Reports some chest soreness after chest compressions. Breathing feels ok, feeling thirsty and wondering when she will be able to eat. Doesn't remember most of the events of yesterday. She denies any prior known reaction to contrast but does have several allergies to medications.     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     ST elevation (STEMI) myocardial infarction of unspecified site      Past Surgical History:  Past Surgical History:   Procedure Laterality Date    DILATION AND CURETTAGE OF UTERUS      ESOPHAGOGASTRODUODENOSCOPY      ESOPHAGOGASTRODUODENOSCOPY N/A 11/22/2023    Procedure: EGD (ESOPHAGOGASTRODUODENOSCOPY);  Surgeon: Brigitte Zeng MD;  Location: [facility];  Service: Endoscopy;  Laterality: N/A;    EYE SURGERY Bilateral     Cataract Surgery    HYSTERECTOMY      VAGINAL DELIVERY      x2     Allergies:  Review of patient's allergies indicates:   Allergen Reactions    Iodinated contrast media Anaphylaxis    Statins-hmg-coa reductase inhibitors Anaphylaxis and Rash     Swelling / Pancreatitis      Adhesive Itching     To Climara brand HRT patch and generic only.     Fenofibrate      Fingers turn purple     Metformin Other (See Comments)     Myalgia /GI UPSET      Omeprazole Other (See  Comments)     Patient claims she experienced hair loss, finger nails falling off, rash, and edema due to medication.     Pamabrom      INTOLERANCE/ SEVERE DEHYDRATION : SIDE EFFECTS    Sulfa (sulfonamide antibiotics) Rash       Medications:   In-Hospital Scheduled Medications:   artificial tears  1 drop Both Eyes QHS    aspirin  81 mg Oral QAM    heparin (porcine)  5,000 Units Subcutaneous Q8H    insulin glargine U-100  15 Units Subcutaneous Daily    isosorbide mononitrate  30 mg Oral Daily    metoprolol tartrate  25 mg Oral BID    multivitamin  1 tablet Oral Daily    polyethylene glycol  17 g Oral Daily      In-Hospital PRN Medications:    Current Facility-Administered Medications:     acetaminophen, 650 mg, Oral, Q4H PRN    aluminum-magnesium hydroxide-simethicone, 30 mL, Oral, QID PRN    artificial tears, 2 drop, Both Eyes, QID PRN    dextrose 10%, 12.5 g, Intravenous, PRN    dextrose 10%, 25 g, Intravenous, PRN    glucagon (human recombinant), 1 mg, Intramuscular, PRN    glucose, 16 g, Oral, PRN    glucose, 24 g, Oral, PRN    insulin aspart U-100, 0-10 Units, Subcutaneous, QID (AC + HS) PRN    melatonin, 6 mg, Oral, Nightly PRN    naloxone, 0.02 mg, Intravenous, PRN    nitroGLYCERIN, 0.4 mg, Sublingual, Q5 Min PRN    ondansetron, 4 mg, Intravenous, Q8H PRN    prochlorperazine, 5 mg, Intravenous, Q6H PRN    senna-docusate 8.6-50 mg, 1 tablet, Oral, BID PRN    simethicone, 1 tablet, Oral, QID PRN    sodium chloride 0.9%, 10 mL, Intravenous, Q12H PRN   In-Hospital IV Infusion Medications:   NORepinephrine bitartrate-D5W  0-3 mcg/kg/min Intravenous Continuous 5.4 mL/hr at 10/09/24 1649 0.02 mcg/kg/min at 10/09/24 1649      Home Medications:  Prior to Admission medications    Medication Sig Start Date End Date Taking? Authorizing Provider   aspirin (ECOTRIN) 81 MG EC tablet Take 81 mg by mouth every morning.   Yes Provider, Historical   BASAGLAR KWIKPEN U-100 INSULIN 100 unit/mL (3 mL) InPn pen Inject 34 Units into  "the skin once daily.  Patient taking differently: Inject 34 Units into the skin every evening. 5/10/24  Yes Chris Moss MD   multivit-min/iron/FA/vit K/lut (CENTRUM SILVER WOMEN ORAL) Take 1 tablet by mouth Daily.   Yes Provider, Historical   RESTASIS 0.05 % ophthalmic emulsion Place 1 drop into both eyes 2 (two) times daily. 23  Yes Provider, Historical   blood-glucose sensor (FREESTYLE ASMITA 3 SENSOR) Vicenta by Misc.(Non-Drug; Combo Route) route.    Provider, Historical   metoprolol tartrate (LOPRESSOR) 25 MG tablet Take 1 tablet (25 mg total) by mouth On call Procedure (Please take 1 tab prior to cardiac CTA and bring the other 2 tabs with you.).  Patient not taking: Reported on 10/7/2024 7/10/24 7/10/25  Mer Murphy NP   resveratroL 250 mg Cap Take by mouth.  Patient not taking: Reported on 2024    Provider, Historical       Family History:  Family History   Problem Relation Name Age of Onset    No Known Problems Mother      No Known Problems Father      Colon cancer Other Niece        Social History:  Social History     Tobacco Use    Smoking status: Never    Smokeless tobacco: Never   Substance Use Topics    Alcohol use: Never    Drug use: Yes     Types: Other-see comments     Comment: 5CBD-gummies for sleep        Objective:   Last 24 Hour Vital Signs:  BP  Min: 66/39  Max: 190/91  Temp  Av.1 °F (36.7 °C)  Min: 97.7 °F (36.5 °C)  Max: 98.6 °F (37 °C)  Pulse  Av.2  Min: 57  Max: 89  Resp  Av.6  Min: 11  Max: 45  SpO2  Av.5 %  Min: 94 %  Max: 100 %  Height  Av' 5" (165.1 cm)  Min: 5' 5" (165.1 cm)  Max: 5' 5" (165.1 cm)  Weight  Av.1 kg (165 lb 9.8 oz)  Min: 74.8 kg (165 lb)  Max: 75.4 kg (166 lb 3.6 oz)  I/O last 3 completed shifts:  In: 164.8 [P.O.:120; I.V.:44.8]  Out: 600 [Urine:600]    Physical Examination:  GEN: older woman, resting in bed comfortably   HEENT: face symmetric, conjunctivae anicteric, MMM  CV: regular rhythm, normal rate, no audible " murmurs  PULM: CTAB, no wheezing, no crackles  Abd: non-distended, soft  Ext: no LE edema  MSK: moving all extremities spontaneously  Skin: no rashes   Neuro: alert, answering questions appropriately     Laboratory:  Trended Lab Data:  Lab Results   Component Value Date    WBC 12.39 10/10/2024    HGB 14.5 10/10/2024    HCT 38.9 10/10/2024    MCV 76 (L) 10/10/2024     10/10/2024     Lab Results   Component Value Date     10/10/2024    K 4.3 10/10/2024     10/10/2024    CO2 18 (L) 10/10/2024    BUN 16 10/10/2024    CREATININE 0.8 10/10/2024    CALCIUM 9.2 10/10/2024    ANIONGAP 11 10/10/2024    EGFRNORACEVR >60 10/10/2024         Cardiac:   Recent Labs   Lab 10/08/24  1218 10/09/24  1746 10/10/24  0228   TROPONINI <0.006 0.140* 0.808*   BNP  --  20  --         Assessment:     Ms. Mcpherson is a 70 yo woman with a history of CAD, HLD, T2DM admitted for unstable angina with cardiac arrest during LHC. Awake and following commands shortly after arrest and was extubated on 10/9.     Neuro  - no acute issues    Cards  # Cardiac arrest  Cardiac arrest during cath procedure. PEA arrest initially to Vtach. ROSC achieved and brought to the ICU. Etiology of arrest remains unclear, concerned for possible medication reaction. Tried to obtain tryptase to access for anaphylaxis but sent morning after arrest.   - recommend outpatient follow-up with allergy/immunology to access risk of contrast allergy    # Distributive shock  Initially hypotensive requiring NE but once sedation weaned patient quickly became hypertensive.     # Elevated troponin  Likely secondary to cardiac arrest. Peaked at 0.8.     # CAD  # Chest pain  LHC on 10/9 for unstable angina, complicated by cardiac arrest. Cardiology following. LHC with 50-60% stenosis of LAD and 99% stenosis of D3. TTE with 50-55% EF.   - continue ASA 81mg daily  - ISMN 30mg daily  - metoprolol tartrate 25mg BID     Pulm  # Acute hypoxemia  Requiring supplemental oxygen  post-arrest. May have some atelectasis/pulm contusions from chest compressions.   - wean O2 as able    GI  # Hepatocellular liver injury  - mild elevation in AST/ALT in the setting of cardiac arrest    Renal  # Non-anion gap metabolic acidosis  No diarrhea, consider work-up for RTA if persistent.     Heme/Onc  - no acute issues    Endocrine  # HLD  - continue home    # T2DM  - continue home glargine 10mg daily   - SSI    Infectious Disease  - no acute issues    Feeding: cardiac diet  Analgesia: tylenol  Sedation: none  DVT ppx: heparin subq  Head of bed: ad azalea  Ulcer ppx: not indicated  Glycemic control: 140-180, glargine + SSI    Beverley Bliss MD  Pulmonary & Critical Care Medicine Fellow

## 2024-10-10 NOTE — SUBJECTIVE & OBJECTIVE
Interval History:  Patient was seen in the morning on 10/10 after extubation on nasal cannula denies any chest pain no nausea or vomiting or diarrhea patient was recovering in the intensive care unit is ready to be downgraded to the telemedicine unit after discontinuation of GTT    Review of Systems   Constitutional:  Negative for activity change, chills and fever.   HENT:  Negative for congestion, rhinorrhea and sore throat.    Eyes:  Negative for discharge and redness.   Respiratory:  Negative for cough, chest tightness, shortness of breath and wheezing.    Cardiovascular:  Negative for chest pain, palpitations and leg swelling.   Gastrointestinal:  Negative for abdominal pain, constipation, diarrhea, nausea and vomiting.   Genitourinary:  Negative for dysuria, flank pain and hematuria.   Musculoskeletal:  Negative for back pain, myalgias and neck pain.        Pain in upper arms, bilateral shoulders, between shoulder blades and armpit   Skin:  Negative for pallor, rash and wound.   Neurological:  Negative for dizziness, tremors, syncope, weakness, numbness and headaches.   Psychiatric/Behavioral:  Negative for agitation, confusion and hallucinations.      Objective:     Vital Signs (Most Recent):  Temp: 98 °F (36.7 °C) (10/10/24 1615)  Pulse: 62 (10/10/24 1445)  Resp: (!) 23 (10/10/24 1445)  BP: (!) 101/54 (10/10/24 1400)  SpO2: 97 % (10/10/24 1445) Vital Signs (24h Range):  Temp:  [97.7 °F (36.5 °C)-98.6 °F (37 °C)] 98 °F (36.7 °C)  Pulse:  [57-89] 62  Resp:  [11-45] 23  SpO2:  [93 %-100 %] 97 %  BP: ()/(39-91) 101/54     Weight: 75.4 kg (166 lb 3.6 oz)  Body mass index is 27.66 kg/m².    Intake/Output Summary (Last 24 hours) at 10/10/2024 1707  Last data filed at 10/10/2024 0001  Gross per 24 hour   Intake 164.75 ml   Output 600 ml   Net -435.25 ml         Physical Exam  Vitals and nursing note reviewed.   Constitutional:       General: She is not in acute distress.  HENT:      Head: Normocephalic and  atraumatic.      Mouth/Throat:      Mouth: Mucous membranes are moist.   Eyes:      General:         Right eye: No discharge.         Left eye: No discharge.      Conjunctiva/sclera: Conjunctivae normal.   Cardiovascular:      Rate and Rhythm: Normal rate and regular rhythm.      Pulses: Normal pulses.   Pulmonary:      Effort: Pulmonary effort is normal.      Breath sounds: Normal breath sounds.   Abdominal:      General: Bowel sounds are normal.      Palpations: Abdomen is soft.   Musculoskeletal:         General: No swelling. Normal range of motion.      Cervical back: Normal range of motion and neck supple.   Skin:     General: Skin is warm and dry.   Neurological:      Mental Status: She is alert and oriented to person, place, and time. Mental status is at baseline.   Psychiatric:         Mood and Affect: Mood normal.             Significant Labs: All pertinent labs within the past 24 hours have been reviewed.  Recent Lab Results  (Last 5 results in the past 24 hours)        10/10/24  1107   10/10/24  1055   10/10/24  0606   10/10/24  0228   10/09/24  2356        A2C EF               A4C EF               Albumin       4.0         ALP       64         ALT       60         Anion Gap       11         Ascending aorta               Ao peak neeraj               Ao VTI               AST       43         AV valve area               HERMELINDO by Velocity Ratio               AV mean gradient               AV index (prosthetic)               AV peak gradient               AV Velocity Ratio               Baso #       0.02         Basophil %       0.2         Bilirubin Direct       0.2         BILIRUBIN TOTAL       0.5  Comment: For infants and newborns, interpretation of results should be based  on gestational age, weight and in agreement with clinical  observations.    Premature Infant recommended reference ranges:  Up to 24 hours.............<8.0 mg/dL  Up to 48 hours............<12.0 mg/dL  3-5 days..................<15.0  mg/dL  6-29 days.................<15.0 mg/dL           BSA               BUN       16         Calcium       9.2         Chloride       107         CO2       18         Creatinine       0.8         Left Ventricle Relative Wall Thickness               Differential Method       Automated         E/A ratio               E/E' ratio               eGFR       >60         Eos #       0.0         Eos %       0.0         E wave deceleration time               FS               Glucose       259         Gran # (ANC)       11.1         Gran %       89.9         Hematocrit       38.9         Hemoglobin       14.5         Immature Grans (Abs)       0.08  Comment: Mild elevation in immature granulocytes is non specific and   can be seen in a variety of conditions including stress response,   acute inflammation, trauma and pregnancy. Correlation with other   laboratory and clinical findings is essential.           Immature Granulocytes       0.6         IVRT               IVSd               LA area A2C               LA area A4C               LA size               LA Vol               JESUS (MOD)               LVOT area               LV LATERAL E/E' RATIO               LV SEPTAL E/E' RATIO               LV EDV BP               LV Diastolic Volume Index               Left Ventricular End Diastolic Volume by Teichholz Method               LV EDV A2C               LV EDV A4C               Left Ventricular End Systolic Volume by Teichholz Method               LV ESV A2C               LV ESV A4C               LVIDd               LVIDs               LV mass               LV Mass Index               Left Ventricular Outflow Tract Mean Gradient               Left Ventricular Outflow Tract Mean Velocity               LVOT diameter               LVOT peak neeraj               LVOT stroke volume               LVOT peak VTI               LV ESV BP               LV Systolic Volume Index               Lymph #       1.1         Lymph %       8.7          Magnesium        2.3         MCH       28.4         MCHC       37.3         MCV       76         Mean e'               Mono #       0.1         Mono %       0.6         MPV       9.1         MV valve area by continuity eq               MV mean gradient               MV peak gradient               MV Peak A Juan Luis               MV Peak E Juan Luis               MV VTI               nRBC       0         Morin's Biplane MOD Ejection Fraction               Phosphorus Level       4.0         Platelet Count       276         POCT Glucose 284     236     248       Potassium       4.3         PROTEIN TOTAL       7.1         PV Peak D Juan Luis               PV Peak S Juan Luis               Pulm vein S/D ratio               PW               RA Vol               RA Area               RA area length vol               RBC       5.10         RDW       13.9         RV S'               RVDD               Sinus               Sodium       136         STJ               TAPSE               TDI SEPTAL               TDI LATERAL               Triscuspid Valve Regurgitation Peak Gradient               TR Max Juan Luis               Troponin I   0.454  Comment: The reference interval for Troponin I represents the 99th percentile   cutoff   for our facility and is consistent with 3rd generation assay   performance.       0.808  Comment: The reference interval for Troponin I represents the 99th percentile   cutoff   for our facility and is consistent with 3rd generation assay   performance.           WBC       12.39         ZLVIDD               ZLVIDS                                      Significant Imaging: I have reviewed all pertinent imaging results/findings within the past 24 hours.

## 2024-10-10 NOTE — ASSESSMENT & PLAN NOTE
- [facility] yesterday for evaluation of [place] complicated by cardiac arrest- PEA then asystole with ROSC after CPR and Epi  - uncertain of etiology of arrest ?spasm vs allergic reaction to iodine; no arrhythmias noted overnight  - echo with normal LVEF post procedure; troponin 0.808 post procedure trending up expectedly given arrest  - continue to monitor on telemetry

## 2024-10-10 NOTE — ASSESSMENT & PLAN NOTE
- HgbA1c 7.5 down from 10.4  - reinforced importance of aggressive BS control  - DM managed by primary team

## 2024-10-10 NOTE — PLAN OF CARE
Pt AAOx4. Afebrile overnight. VSS.  Sats remained >92 on 2L NC. UOP of 600 mL; no bm. Vasc-band removed, no hematoma, and pulses palpable. No chest pain overnight, but reported soreness from area where chest compressions were performed. Mag replacement given for repletion.  Frequent checks for safety done during shift. Will report to oncoming RN.      Problem: Adult Inpatient Plan of Care  Goal: Plan of Care Review  Outcome: Progressing  Goal: Patient-Specific Goal (Individualized)  Outcome: Progressing  Goal: Absence of Hospital-Acquired Illness or Injury  Outcome: Progressing  Goal: Optimal Comfort and Wellbeing  Outcome: Progressing  Goal: Readiness for Transition of Care  Outcome: Progressing     Problem: Chest Pain  Goal: Resolution of Chest Pain Symptoms  Outcome: Progressing     Problem: Diabetes Comorbidity  Goal: Blood Glucose Level Within Targeted Range  Outcome: Progressing     Problem: Fall Injury Risk  Goal: Absence of Fall and Fall-Related Injury  Reactivated

## 2024-10-10 NOTE — ASSESSMENT & PLAN NOTE
-patient complained of feeling like there is a heavy brick on her chest  -troponins negative  -cardiac monitor  -7/14/2024 CT angiogram of the coronary arteries demonstrated Diffuse atherosclerosis with long segment in mid LAD of 50-70% stenosis.Coronary calcium score 89.   -continue with aspirin, beta-blocker and isosorbide  -cardiology consult placed  -patient is scheduled for C today 10/9, patient crashed during the procedure with PEA after 2 epi, and and DC shock, patient achieved rods patient was intubated for to ICU for overnight, patient extubated on 10/10 of drips patient was downgraded telemedicine not sure what could cause the cardiac arrest.  Cardiology planning for Staging iFR of mLAD and PCI of D3 of she is HD stable

## 2024-10-11 ENCOUNTER — ANESTHESIA (OUTPATIENT)
Dept: CARDIOLOGY | Facility: HOSPITAL | Age: 69
End: 2024-10-11
Payer: MEDICARE

## 2024-10-11 LAB
ANION GAP SERPL CALC-SCNC: 12 MMOL/L (ref 8–16)
BASOPHILS # BLD AUTO: 0.06 K/UL (ref 0–0.2)
BASOPHILS NFR BLD: 0.4 % (ref 0–1.9)
BUN SERPL-MCNC: 29 MG/DL (ref 8–23)
CALCIUM SERPL-MCNC: 9.6 MG/DL (ref 8.7–10.5)
CHLORIDE SERPL-SCNC: 104 MMOL/L (ref 95–110)
CO2 SERPL-SCNC: 20 MMOL/L (ref 23–29)
CREAT SERPL-MCNC: 1 MG/DL (ref 0.5–1.4)
DIFFERENTIAL METHOD BLD: ABNORMAL
EOSINOPHIL # BLD AUTO: 0.1 K/UL (ref 0–0.5)
EOSINOPHIL NFR BLD: 0.6 % (ref 0–8)
ERYTHROCYTE [DISTWIDTH] IN BLOOD BY AUTOMATED COUNT: 14.2 % (ref 11.5–14.5)
EST. GFR  (NO RACE VARIABLE): >60 ML/MIN/1.73 M^2
GLUCOSE SERPL-MCNC: 239 MG/DL (ref 70–110)
HCT VFR BLD AUTO: 41.7 % (ref 37–48.5)
HGB BLD-MCNC: 15.2 G/DL (ref 12–16)
IMM GRANULOCYTES # BLD AUTO: 0.07 K/UL (ref 0–0.04)
IMM GRANULOCYTES NFR BLD AUTO: 0.4 % (ref 0–0.5)
LYMPHOCYTES # BLD AUTO: 3.7 K/UL (ref 1–4.8)
LYMPHOCYTES NFR BLD: 23.1 % (ref 18–48)
MAGNESIUM SERPL-MCNC: 1.9 MG/DL (ref 1.6–2.6)
MCH RBC QN AUTO: 28.1 PG (ref 27–31)
MCHC RBC AUTO-ENTMCNC: 36.5 G/DL (ref 32–36)
MCV RBC AUTO: 77 FL (ref 82–98)
MONOCYTES # BLD AUTO: 1.1 K/UL (ref 0.3–1)
MONOCYTES NFR BLD: 7.1 % (ref 4–15)
NEUTROPHILS # BLD AUTO: 10.9 K/UL (ref 1.8–7.7)
NEUTROPHILS NFR BLD: 68.4 % (ref 38–73)
NRBC BLD-RTO: 0 /100 WBC
PHOSPHATE SERPL-MCNC: 2.7 MG/DL (ref 2.7–4.5)
PLATELET # BLD AUTO: 331 K/UL (ref 150–450)
PMV BLD AUTO: 10.1 FL (ref 9.2–12.9)
POC ACTIVATED CLOTTING TIME K: 269 SEC (ref 74–137)
POCT GLUCOSE: 238 MG/DL (ref 70–110)
POCT GLUCOSE: 283 MG/DL (ref 70–110)
POCT GLUCOSE: 334 MG/DL (ref 70–110)
POCT GLUCOSE: 354 MG/DL (ref 70–110)
POCT GLUCOSE: 381 MG/DL (ref 70–110)
POTASSIUM SERPL-SCNC: 4.3 MMOL/L (ref 3.5–5.1)
RBC # BLD AUTO: 5.41 M/UL (ref 4–5.4)
SAMPLE: ABNORMAL
SODIUM SERPL-SCNC: 136 MMOL/L (ref 136–145)
TRYPTASE LEVEL: 5.9 NG/ML
WBC # BLD AUTO: 15.88 K/UL (ref 3.9–12.7)

## 2024-10-11 PROCEDURE — C1874 STENT, COATED/COV W/DEL SYS: HCPCS | Performed by: STUDENT IN AN ORGANIZED HEALTH CARE EDUCATION/TRAINING PROGRAM

## 2024-10-11 PROCEDURE — C1887 CATHETER, GUIDING: HCPCS | Performed by: STUDENT IN AN ORGANIZED HEALTH CARE EDUCATION/TRAINING PROGRAM

## 2024-10-11 PROCEDURE — 63600175 PHARM REV CODE 636 W HCPCS: Performed by: INTERNAL MEDICINE

## 2024-10-11 PROCEDURE — 63600175 PHARM REV CODE 636 W HCPCS: Performed by: STUDENT IN AN ORGANIZED HEALTH CARE EDUCATION/TRAINING PROGRAM

## 2024-10-11 PROCEDURE — 63600175 PHARM REV CODE 636 W HCPCS: Performed by: NURSE PRACTITIONER

## 2024-10-11 PROCEDURE — 63600175 PHARM REV CODE 636 W HCPCS

## 2024-10-11 PROCEDURE — 93010 ELECTROCARDIOGRAM REPORT: CPT | Mod: ,,, | Performed by: INTERNAL MEDICINE

## 2024-10-11 PROCEDURE — C1894 INTRO/SHEATH, NON-LASER: HCPCS | Performed by: STUDENT IN AN ORGANIZED HEALTH CARE EDUCATION/TRAINING PROGRAM

## 2024-10-11 PROCEDURE — C1760 CLOSURE DEV, VASC: HCPCS | Performed by: STUDENT IN AN ORGANIZED HEALTH CARE EDUCATION/TRAINING PROGRAM

## 2024-10-11 PROCEDURE — C1725 CATH, TRANSLUMIN NON-LASER: HCPCS | Performed by: STUDENT IN AN ORGANIZED HEALTH CARE EDUCATION/TRAINING PROGRAM

## 2024-10-11 PROCEDURE — 25000003 PHARM REV CODE 250: Performed by: INTERNAL MEDICINE

## 2024-10-11 PROCEDURE — 94761 N-INVAS EAR/PLS OXIMETRY MLT: CPT

## 2024-10-11 PROCEDURE — 93005 ELECTROCARDIOGRAM TRACING: CPT

## 2024-10-11 PROCEDURE — 93799 UNLISTED CV SVC/PROCEDURE: CPT | Performed by: STUDENT IN AN ORGANIZED HEALTH CARE EDUCATION/TRAINING PROGRAM

## 2024-10-11 PROCEDURE — 25000003 PHARM REV CODE 250

## 2024-10-11 PROCEDURE — 83735 ASSAY OF MAGNESIUM: CPT | Performed by: INTERNAL MEDICINE

## 2024-10-11 PROCEDURE — 4A023N7 MEASUREMENT OF CARDIAC SAMPLING AND PRESSURE, LEFT HEART, PERCUTANEOUS APPROACH: ICD-10-PCS | Performed by: STUDENT IN AN ORGANIZED HEALTH CARE EDUCATION/TRAINING PROGRAM

## 2024-10-11 PROCEDURE — C9600 PERC DRUG-EL COR STENT SING: HCPCS | Mod: LD | Performed by: STUDENT IN AN ORGANIZED HEALTH CARE EDUCATION/TRAINING PROGRAM

## 2024-10-11 PROCEDURE — 27201423 OPTIME MED/SURG SUP & DEVICES STERILE SUPPLY: Performed by: STUDENT IN AN ORGANIZED HEALTH CARE EDUCATION/TRAINING PROGRAM

## 2024-10-11 PROCEDURE — 85025 COMPLETE CBC W/AUTO DIFF WBC: CPT | Performed by: INTERNAL MEDICINE

## 2024-10-11 PROCEDURE — 37000009 HC ANESTHESIA EA ADD 15 MINS: Performed by: STUDENT IN AN ORGANIZED HEALTH CARE EDUCATION/TRAINING PROGRAM

## 2024-10-11 PROCEDURE — 25000003 PHARM REV CODE 250: Performed by: STUDENT IN AN ORGANIZED HEALTH CARE EDUCATION/TRAINING PROGRAM

## 2024-10-11 PROCEDURE — B211YZZ FLUOROSCOPY OF MULTIPLE CORONARY ARTERIES USING OTHER CONTRAST: ICD-10-PCS | Performed by: STUDENT IN AN ORGANIZED HEALTH CARE EDUCATION/TRAINING PROGRAM

## 2024-10-11 PROCEDURE — 027034Z DILATION OF CORONARY ARTERY, ONE ARTERY WITH DRUG-ELUTING INTRALUMINAL DEVICE, PERCUTANEOUS APPROACH: ICD-10-PCS | Performed by: STUDENT IN AN ORGANIZED HEALTH CARE EDUCATION/TRAINING PROGRAM

## 2024-10-11 PROCEDURE — 80048 BASIC METABOLIC PNL TOTAL CA: CPT | Performed by: INTERNAL MEDICINE

## 2024-10-11 PROCEDURE — 4A033BC MEASUREMENT OF ARTERIAL PRESSURE, CORONARY, PERCUTANEOUS APPROACH: ICD-10-PCS | Performed by: STUDENT IN AN ORGANIZED HEALTH CARE EDUCATION/TRAINING PROGRAM

## 2024-10-11 PROCEDURE — 36415 COLL VENOUS BLD VENIPUNCTURE: CPT | Performed by: INTERNAL MEDICINE

## 2024-10-11 PROCEDURE — 85347 COAGULATION TIME ACTIVATED: CPT | Performed by: STUDENT IN AN ORGANIZED HEALTH CARE EDUCATION/TRAINING PROGRAM

## 2024-10-11 PROCEDURE — 93571 IV DOP VEL&/PRESS C FLO 1ST: CPT | Mod: 26,52,, | Performed by: STUDENT IN AN ORGANIZED HEALTH CARE EDUCATION/TRAINING PROGRAM

## 2024-10-11 PROCEDURE — 37000008 HC ANESTHESIA 1ST 15 MINUTES: Performed by: STUDENT IN AN ORGANIZED HEALTH CARE EDUCATION/TRAINING PROGRAM

## 2024-10-11 PROCEDURE — 63600175 PHARM REV CODE 636 W HCPCS: Performed by: NURSE ANESTHETIST, CERTIFIED REGISTERED

## 2024-10-11 PROCEDURE — 84100 ASSAY OF PHOSPHORUS: CPT | Performed by: INTERNAL MEDICINE

## 2024-10-11 PROCEDURE — 92928 PRQ TCAT PLMT NTRAC ST 1 LES: CPT | Mod: LD,,, | Performed by: STUDENT IN AN ORGANIZED HEALTH CARE EDUCATION/TRAINING PROGRAM

## 2024-10-11 PROCEDURE — 11000001 HC ACUTE MED/SURG PRIVATE ROOM

## 2024-10-11 PROCEDURE — C1769 GUIDE WIRE: HCPCS | Performed by: STUDENT IN AN ORGANIZED HEALTH CARE EDUCATION/TRAINING PROGRAM

## 2024-10-11 PROCEDURE — 25500020 PHARM REV CODE 255: Performed by: STUDENT IN AN ORGANIZED HEALTH CARE EDUCATION/TRAINING PROGRAM

## 2024-10-11 DEVICE — EVEROLIMUS-ELUTING PLATINUM CHROMIUM CORONARY STENT SYSTEM
Type: IMPLANTABLE DEVICE | Site: CHEST | Status: FUNCTIONAL
Brand: SYNERGY™ XD

## 2024-10-11 RX ORDER — FENTANYL CITRATE 50 UG/ML
INJECTION, SOLUTION INTRAMUSCULAR; INTRAVENOUS
Status: DISCONTINUED | OUTPATIENT
Start: 2024-10-11 | End: 2024-10-11

## 2024-10-11 RX ORDER — ONDANSETRON 8 MG/1
8 TABLET, ORALLY DISINTEGRATING ORAL EVERY 8 HOURS PRN
Status: DISCONTINUED | OUTPATIENT
Start: 2024-10-11 | End: 2024-10-13 | Stop reason: HOSPADM

## 2024-10-11 RX ORDER — CEFAZOLIN SODIUM 1 G/3ML
INJECTION, POWDER, FOR SOLUTION INTRAMUSCULAR; INTRAVENOUS
Status: DISCONTINUED | OUTPATIENT
Start: 2024-10-11 | End: 2024-10-11 | Stop reason: HOSPADM

## 2024-10-11 RX ORDER — ISOSORBIDE MONONITRATE 30 MG/1
30 TABLET, EXTENDED RELEASE ORAL DAILY
Qty: 30 TABLET | Refills: 11 | Status: SHIPPED | OUTPATIENT
Start: 2024-10-12 | End: 2025-10-12

## 2024-10-11 RX ORDER — LIDOCAINE HYDROCHLORIDE 10 MG/ML
INJECTION, SOLUTION INFILTRATION; PERINEURAL
Status: DISCONTINUED | OUTPATIENT
Start: 2024-10-11 | End: 2024-10-11 | Stop reason: HOSPADM

## 2024-10-11 RX ORDER — SODIUM CHLORIDE 9 MG/ML
INJECTION, SOLUTION INTRAVENOUS CONTINUOUS
Status: ACTIVE | OUTPATIENT
Start: 2024-10-11 | End: 2024-10-11

## 2024-10-11 RX ORDER — HEPARIN SODIUM 200 [USP'U]/100ML
INJECTION, SOLUTION INTRAVENOUS
Status: DISCONTINUED | OUTPATIENT
Start: 2024-10-11 | End: 2024-10-13 | Stop reason: HOSPADM

## 2024-10-11 RX ORDER — ACETAMINOPHEN 325 MG/1
650 TABLET ORAL EVERY 4 HOURS PRN
Status: DISCONTINUED | OUTPATIENT
Start: 2024-10-11 | End: 2024-10-13 | Stop reason: HOSPADM

## 2024-10-11 RX ORDER — METOPROLOL TARTRATE 25 MG/1
25 TABLET, FILM COATED ORAL 2 TIMES DAILY
Qty: 180 TABLET | Refills: 3 | Status: SHIPPED | OUTPATIENT
Start: 2024-10-11 | End: 2025-10-11

## 2024-10-11 RX ORDER — MUPIROCIN 20 MG/G
OINTMENT TOPICAL 2 TIMES DAILY
Status: DISCONTINUED | OUTPATIENT
Start: 2024-10-11 | End: 2024-10-13 | Stop reason: HOSPADM

## 2024-10-11 RX ORDER — CLOPIDOGREL BISULFATE 75 MG/1
75 TABLET ORAL DAILY
Qty: 30 TABLET | Refills: 11 | Status: SHIPPED | OUTPATIENT
Start: 2024-10-12 | End: 2025-10-12

## 2024-10-11 RX ORDER — IODIXANOL 320 MG/ML
INJECTION, SOLUTION INTRAVASCULAR
Status: DISCONTINUED | OUTPATIENT
Start: 2024-10-11 | End: 2024-10-13 | Stop reason: HOSPADM

## 2024-10-11 RX ORDER — PHENYLEPHRINE HCL IN 0.9% NACL 1 MG/10 ML
SYRINGE (ML) INTRAVENOUS
Status: DISCONTINUED | OUTPATIENT
Start: 2024-10-11 | End: 2024-10-11 | Stop reason: HOSPADM

## 2024-10-11 RX ORDER — DIPHENHYDRAMINE HYDROCHLORIDE 50 MG/ML
50 INJECTION INTRAMUSCULAR; INTRAVENOUS ONCE
Status: COMPLETED | OUTPATIENT
Start: 2024-10-11 | End: 2024-10-11

## 2024-10-11 RX ORDER — INSULIN ASPART 100 [IU]/ML
5 INJECTION, SOLUTION INTRAVENOUS; SUBCUTANEOUS
Status: DISCONTINUED | OUTPATIENT
Start: 2024-10-11 | End: 2024-10-12

## 2024-10-11 RX ORDER — HEPARIN SODIUM 1000 [USP'U]/ML
INJECTION, SOLUTION INTRAVENOUS; SUBCUTANEOUS
Status: DISCONTINUED | OUTPATIENT
Start: 2024-10-11 | End: 2024-10-11 | Stop reason: HOSPADM

## 2024-10-11 RX ORDER — MIDAZOLAM HYDROCHLORIDE 1 MG/ML
INJECTION INTRAMUSCULAR; INTRAVENOUS
Status: DISCONTINUED | OUTPATIENT
Start: 2024-10-11 | End: 2024-10-11

## 2024-10-11 RX ORDER — METHYLPREDNISOLONE SOD SUCC 125 MG
125 VIAL (EA) INJECTION ONCE
Status: COMPLETED | OUTPATIENT
Start: 2024-10-11 | End: 2024-10-11

## 2024-10-11 RX ADMIN — INSULIN GLARGINE 15 UNITS: 100 INJECTION, SOLUTION SUBCUTANEOUS at 07:10

## 2024-10-11 RX ADMIN — GLYCOPYRROLATE 0.1 MG: 0.2 INJECTION, SOLUTION INTRAMUSCULAR; INTRAVITREAL at 08:10

## 2024-10-11 RX ADMIN — ASPIRIN 81 MG: 81 TABLET, COATED ORAL at 07:10

## 2024-10-11 RX ADMIN — FENTANYL CITRATE 50 MCG: 50 INJECTION INTRAMUSCULAR; INTRAVENOUS at 09:10

## 2024-10-11 RX ADMIN — MUPIROCIN: 20 OINTMENT TOPICAL at 10:10

## 2024-10-11 RX ADMIN — METOPROLOL TARTRATE 25 MG: 25 TABLET, FILM COATED ORAL at 10:10

## 2024-10-11 RX ADMIN — METOPROLOL TARTRATE 25 MG: 25 TABLET, FILM COATED ORAL at 07:10

## 2024-10-11 RX ADMIN — DIPHENHYDRAMINE HYDROCHLORIDE 50 MG: 50 INJECTION INTRAMUSCULAR; INTRAVENOUS at 07:10

## 2024-10-11 RX ADMIN — INSULIN ASPART 4 UNITS: 100 INJECTION, SOLUTION INTRAVENOUS; SUBCUTANEOUS at 07:10

## 2024-10-11 RX ADMIN — CLOPIDOGREL BISULFATE 75 MG: 75 TABLET ORAL at 07:10

## 2024-10-11 RX ADMIN — INSULIN ASPART 4 UNITS: 100 INJECTION, SOLUTION INTRAVENOUS; SUBCUTANEOUS at 10:10

## 2024-10-11 RX ADMIN — METHYLPREDNISOLONE SODIUM SUCCINATE 125 MG: 125 INJECTION, POWDER, FOR SOLUTION INTRAMUSCULAR; INTRAVENOUS at 07:10

## 2024-10-11 RX ADMIN — HEPARIN SODIUM 5000 UNITS: 5000 INJECTION INTRAVENOUS; SUBCUTANEOUS at 10:10

## 2024-10-11 RX ADMIN — INSULIN ASPART 5 UNITS: 100 INJECTION, SOLUTION INTRAVENOUS; SUBCUTANEOUS at 11:10

## 2024-10-11 RX ADMIN — FENTANYL CITRATE 50 MCG: 50 INJECTION INTRAMUSCULAR; INTRAVENOUS at 08:10

## 2024-10-11 RX ADMIN — THERA TABS 1 TABLET: TAB at 07:10

## 2024-10-11 RX ADMIN — HYPROMELLOSE 2910 1 DROP: 5 SOLUTION OPHTHALMIC at 10:10

## 2024-10-11 RX ADMIN — INSULIN ASPART 5 UNITS: 100 INJECTION, SOLUTION INTRAVENOUS; SUBCUTANEOUS at 04:10

## 2024-10-11 RX ADMIN — SODIUM CHLORIDE: 9 INJECTION, SOLUTION INTRAVENOUS at 09:10

## 2024-10-11 RX ADMIN — ISOSORBIDE MONONITRATE 30 MG: 30 TABLET, EXTENDED RELEASE ORAL at 07:10

## 2024-10-11 RX ADMIN — INSULIN ASPART 6 UNITS: 100 INJECTION, SOLUTION INTRAVENOUS; SUBCUTANEOUS at 11:10

## 2024-10-11 RX ADMIN — MIDAZOLAM HYDROCHLORIDE 2 MG: 1 INJECTION, SOLUTION INTRAMUSCULAR; INTRAVENOUS at 08:10

## 2024-10-11 RX ADMIN — INSULIN ASPART 10 UNITS: 100 INJECTION, SOLUTION INTRAVENOUS; SUBCUTANEOUS at 04:10

## 2024-10-11 RX ADMIN — HEPARIN SODIUM 5000 UNITS: 5000 INJECTION INTRAVENOUS; SUBCUTANEOUS at 02:10

## 2024-10-11 NOTE — PLAN OF CARE
Problem: Adult Inpatient Plan of Care  Goal: Plan of Care Review  Outcome: Progressing  Goal: Patient-Specific Goal (Individualized)  Outcome: Progressing  Goal: Optimal Comfort and Wellbeing  Outcome: Progressing  Goal: Readiness for Transition of Care  Outcome: Progressing     Problem: Diabetes Comorbidity  Goal: Blood Glucose Level Within Targeted Range  Outcome: Progressing     Problem: Chest Pain  Goal: Resolution of Chest Pain Symptoms  Outcome: Progressing     Problem: Fall Injury Risk  Goal: Absence of Fall and Fall-Related Injury  Outcome: Progressing

## 2024-10-11 NOTE — NURSING
Pt transferred from 560 to 480 via wheelchair.. Pt aox4, and VSS. Daughter at bedside during transfer. Pt oriented to room, call light w/in reach, and bed in lowest position. Report given to Anna RAMIREZ.

## 2024-10-11 NOTE — BRIEF OP NOTE
[facility] - Intensive Care  Surgery Department  Operative Note    SUMMARY   POST CATH NOTE    Date of Procedure: 10/11/2024     Procedure: Procedure(s) (LRB):  Left heart cath (Left)  INSERTION, STENT, CORONARY ARTERY (Left)  Instantaneous Wave-Free Ratio (IFR) (Left)  Percutaneous coronary intervention (N/A)  ANGIOGRAM, CORONARY ARTERY (N/A)     Surgeons and Role:     * Jesus Gurrola MD - Primary    Assisting Surgeon: None    Pre-Operative Diagnosis: Coronary artery disease involving native coronary artery of native heart with unstable angina pectoris [I25.110]    Post-Operative Diagnosis: Post-Op Diagnosis Codes:     * Coronary artery disease involving native coronary artery of native heart with unstable angina pectoris [I25.110]    s/p catheterization secondary to:     Cath Results:  Access: US guided RCFA     Coronary Anatomy:     Left Main Coronary Artery: The left main is a large and short caliber vessel arising normally from the left sinus of valsalva.  It bifurcates into the left anterior descending and left circumflex coronary artery.  It is free of evidence of obstructive coronary artery disease. JUANITA III flow     Left Anterior Descending: The left anterior descending coronary artery is a large caliber vessel arising normally from the left main and extending to the apex.  It gives rise to  caliber diagonal arteries. Mid LAD with 50-60% stenosis, D3 with proximal 99% stenosis.       Left Circumflex: The left circumflex is a large caliber vessel arising normally from the left main coronary artery, it is co-dominant.  It gives rise to moderate caliber obtuse marginal branches. OM1 with 20-30% stenosis.  The circumflex system is free of obstructive coronary artery disease. JUANITA III flow      Intervention:   PCI procedure:  Heparin administered. ACT was closely monitored. Using a JL 4 guider, this was used to cannulate the left system. Runthrough PCI wire repshaped outside the body and advanced to D3.  "Predilated at nominal pressure with 2.5 mm SC. Advanced a 2.5 x 12 mm MENG at deployed at nominal pressure with great results. Post dilated with 2.5 x 8 mm NC at nominal pressure. After the balloon was removed.  The wire was left in place.  Repeat angiography showed no signs of dissection.  Subsequently the wire was pulled back. Final angiography showed JUANITA-3 flow.  No signs of dissection, perforation, or thrombus.    iFR  The 0.014" Omniwire was connected to the console, calibrated and equalized. The 0.014"  Omniwire was then advanced into the left main outside of the catheter and flushed with saline  with the introducer removed. The pressure was normalized and then the wire was advanced  across D3 the lesion. 0.92 and 0.93 iFR recordings were obtained. The 0.014" Omniwire was then pulled  back slowly across the lesion to assess for step up and electronic drift. The wire was removed,  and final angiography was performed.      Closure device: Perclose  Patient tolerated procedure well, no complications    Post Cath Exam:  BP (!) 112/57   Pulse (!) 57   Temp 97 °F (36.1 °C) (Skin)   Resp 14   Ht 5' 5" (1.651 m)   Wt 72 kg (158 lb 11.7 oz)   SpO2 (!) 91%   BMI 26.41 kg/m²     Anesthesia: Monitor Anesthesia Care      Estimated Blood Loss (EBL): * No values recorded between 10/11/2024  8:31 AM and 10/11/2024  9:14 AM *           Specimens:   Specimen (24h ago, onward)      None               Assessment:   Successful PCI of D3 with 2.5 x 12 mm MENG  Negative iFr of mLAD (0.92)- will medically manage    Plan:  - Patient tolerated procedure well. No immediate complications  - Continue aspirin and Plavix for 1 year, then aspirin for life  - EKG when arrives to floor  - Routine post cath protocol  - Maximize medical management  - Further care by the primary team  - IVF at  100cc/hr  for 2 hours  - Aggressive Lipid lowering medicine with repatha.  - Follow up with me    "

## 2024-10-11 NOTE — INTERVAL H&P NOTE
The patient has been examined and the H&P has been reviewed:    I concur with the findings and no changes have occurred since H&P was written.  After an eventful first diagnostic coronary angiogram with cardiac arrest unclear reasons we decided to bring anesthesia for sedation. I had a long conversation with her about the procedure and increased- she voiced understanding and wanted to proceed. She was premedicated.     Procedure risks, benefits and alternative options discussed and understood by patient/family.          Active Hospital Problems    Diagnosis  POA    *Chest pain [R07.9]  Yes    Cardiac arrest [I46.9]  No    Coronary artery disease involving native coronary artery of native heart with unstable angina pectoris [I25.110]  Yes    Type 2 diabetes mellitus with diabetic polyneuropathy, with long-term current use of insulin [E11.42, Z79.4]  Not Applicable    Mixed hyperlipidemia [E78.2]  Yes      Resolved Hospital Problems   No resolved problems to display.

## 2024-10-11 NOTE — TRANSFER OF CARE
"Anesthesia Transfer of Care Note    Patient: Terra Mcpherson    Procedure(s) Performed: Procedure(s) (LRB):  Left heart cath (Left)  INSERTION, STENT, CORONARY ARTERY (Left)  Instantaneous Wave-Free Ratio (IFR) (Left)    Patient location: ICU    Anesthesia Type: general    Transport from OR: Transported from OR on room air with adequate spontaneous ventilation. Continuous ECG monitoring in transport. Continuous SpO2 monitoring in transport    Post pain: adequate analgesia    Post assessment: no apparent anesthetic complications and tolerated procedure well    Post vital signs: stable    Level of consciousness: awake and alert    Nausea/Vomiting: no nausea/vomiting    Complications: none    Transfer of care protocol was followedComments: Report given to ASHLEY Bangura. VSS      Last vitals: Visit Vitals  BP (!) 148/79 (BP Location: Right arm, Patient Position: Lying)   Pulse 60   Temp 36.1 °C (97 °F) (Skin)   Resp 12   Ht 5' 5" (1.651 m)   Wt 72 kg (158 lb 11.7 oz)   SpO2 100%   BMI 26.41 kg/m²     "

## 2024-10-11 NOTE — ANESTHESIA PREPROCEDURE EVALUATION
10/11/2024  Terra Mcpherson is a 69 y.o., female.      Pre-op Assessment    I have reviewed the Patient Summary Reports.     I have reviewed the Nursing Notes. I have reviewed the NPO Status.   I have reviewed the Medications.     Review of Systems  Anesthesia Hx:  No problems with previous Anesthesia               Denies Personal Hx of Anesthesia complications.                    Social:  Non-Smoker       Cardiovascular:       Past MI CAD      Angina CHF    hyperlipidemia   ECG has been reviewed. MI with cardiac arrest in cath lab 2 days prior requiring CPR and intubation. Taken to ICU intubated and on pressors, subsequently extubated and now off pressors. Global hypokinesis on echo, occluded diagonal and diffuse disease on cath report                          Pulmonary:  Pulmonary Normal                       Renal/:  Renal/ Normal                 Hepatic/GI:     GERD             Neurological:    Neuromuscular Disease,                                   Endocrine:  Diabetes               Physical Exam  General: Cooperative, Alert and Oriented    Airway:  Mallampati: II   Mouth Opening: Normal  TM Distance: Normal  Tongue: Normal  Neck ROM: Normal ROM    Dental:  Intact        Anesthesia Plan  Type of Anesthesia, risks & benefits discussed:    Anesthesia Type: MAC  Intra-op Monitoring Plan: Standard ASA Monitors  Post Op Pain Control Plan: multimodal analgesia and IV/PO Opioids PRN  Induction:  IV  Informed Consent: Informed consent signed with the Patient and all parties understand the risks and agree with anesthesia plan.  All questions answered.   ASA Score: 4  Day of Surgery Review of History & Physical: H&P Update referred to the surgeon/provider.    Ready For Surgery From Anesthesia Perspective.     .

## 2024-10-11 NOTE — ANESTHESIA POSTPROCEDURE EVALUATION
Anesthesia Post Evaluation    Patient: Terra Mcpherson    Procedure(s) Performed: Procedure(s) (LRB):  Left heart cath (Left)  INSERTION, STENT, CORONARY ARTERY (Left)  Instantaneous Wave-Free Ratio (IFR) (Left)    Final Anesthesia Type: general      Patient location during evaluation: ICU  Patient participation: Yes- Able to Participate  Level of consciousness: awake and alert  Post-procedure vital signs: reviewed and stable  Pain management: adequate  Airway patency: patent    PONV status at discharge: No PONV  Anesthetic complications: no      Cardiovascular status: stable  Respiratory status: room air  Hydration status: euvolemic  Follow-up not needed.              Vitals Value Taken Time   /79 10/11/24 0930   Temp 36.1 °C (97 °F) 10/11/24 0930   Pulse 60 10/11/24 0930   Resp 12 10/11/24 0930   SpO2 100 % 10/11/24 0930         No case tracking events are documented in the log.      Pain/Jourdan Score: Jourdan Score: 10 (10/11/2024  8:22 AM)

## 2024-10-11 NOTE — NURSING
Pt returned from cath lab. VSS. Right groin site: no hematoma/bleeding. Pulses palpable. Neurologically intact. Placed on 2L NC.

## 2024-10-12 LAB
POCT GLUCOSE: 105 MG/DL (ref 70–110)
POCT GLUCOSE: 147 MG/DL (ref 70–110)
POCT GLUCOSE: 245 MG/DL (ref 70–110)
POCT GLUCOSE: 252 MG/DL (ref 70–110)
POCT GLUCOSE: 97 MG/DL (ref 70–110)
TROPONIN I SERPL DL<=0.01 NG/ML-MCNC: 0.12 NG/ML (ref 0–0.03)
TROPONIN I SERPL DL<=0.01 NG/ML-MCNC: 0.12 NG/ML (ref 0–0.03)

## 2024-10-12 PROCEDURE — 94761 N-INVAS EAR/PLS OXIMETRY MLT: CPT

## 2024-10-12 PROCEDURE — 25000003 PHARM REV CODE 250: Performed by: INTERNAL MEDICINE

## 2024-10-12 PROCEDURE — 36415 COLL VENOUS BLD VENIPUNCTURE: CPT

## 2024-10-12 PROCEDURE — 11000001 HC ACUTE MED/SURG PRIVATE ROOM

## 2024-10-12 PROCEDURE — 99233 SBSQ HOSP IP/OBS HIGH 50: CPT | Mod: 95,,, | Performed by: INTERNAL MEDICINE

## 2024-10-12 PROCEDURE — 63600175 PHARM REV CODE 636 W HCPCS: Performed by: INTERNAL MEDICINE

## 2024-10-12 PROCEDURE — 93005 ELECTROCARDIOGRAM TRACING: CPT

## 2024-10-12 PROCEDURE — 93010 ELECTROCARDIOGRAM REPORT: CPT | Mod: ,,, | Performed by: INTERNAL MEDICINE

## 2024-10-12 PROCEDURE — 25000242 PHARM REV CODE 250 ALT 637 W/ HCPCS: Performed by: INTERNAL MEDICINE

## 2024-10-12 PROCEDURE — 25000003 PHARM REV CODE 250

## 2024-10-12 PROCEDURE — 93010 ELECTROCARDIOGRAM REPORT: CPT | Mod: ,,, | Performed by: STUDENT IN AN ORGANIZED HEALTH CARE EDUCATION/TRAINING PROGRAM

## 2024-10-12 PROCEDURE — 25000003 PHARM REV CODE 250: Performed by: STUDENT IN AN ORGANIZED HEALTH CARE EDUCATION/TRAINING PROGRAM

## 2024-10-12 PROCEDURE — 63600175 PHARM REV CODE 636 W HCPCS

## 2024-10-12 PROCEDURE — 84484 ASSAY OF TROPONIN QUANT: CPT

## 2024-10-12 RX ORDER — ENOXAPARIN SODIUM 100 MG/ML
1 INJECTION SUBCUTANEOUS EVERY 12 HOURS
Status: DISCONTINUED | OUTPATIENT
Start: 2024-10-12 | End: 2024-10-13 | Stop reason: HOSPADM

## 2024-10-12 RX ORDER — LISINOPRIL 10 MG/1
10 TABLET ORAL DAILY
Status: DISCONTINUED | OUTPATIENT
Start: 2024-10-12 | End: 2024-10-13 | Stop reason: HOSPADM

## 2024-10-12 RX ORDER — MORPHINE SULFATE 2 MG/ML
1 INJECTION, SOLUTION INTRAMUSCULAR; INTRAVENOUS EVERY 4 HOURS PRN
Status: DISCONTINUED | OUTPATIENT
Start: 2024-10-12 | End: 2024-10-13 | Stop reason: HOSPADM

## 2024-10-12 RX ORDER — INSULIN GLARGINE 100 [IU]/ML
15 INJECTION, SOLUTION SUBCUTANEOUS 2 TIMES DAILY
Status: DISCONTINUED | OUTPATIENT
Start: 2024-10-12 | End: 2024-10-13 | Stop reason: HOSPADM

## 2024-10-12 RX ORDER — EZETIMIBE 10 MG/1
10 TABLET ORAL NIGHTLY
Status: DISCONTINUED | OUTPATIENT
Start: 2024-10-12 | End: 2024-10-13 | Stop reason: HOSPADM

## 2024-10-12 RX ORDER — INSULIN ASPART 100 [IU]/ML
7 INJECTION, SOLUTION INTRAVENOUS; SUBCUTANEOUS
Status: DISCONTINUED | OUTPATIENT
Start: 2024-10-12 | End: 2024-10-13 | Stop reason: HOSPADM

## 2024-10-12 RX ADMIN — THERA TABS 1 TABLET: TAB at 08:10

## 2024-10-12 RX ADMIN — INSULIN ASPART 4 UNITS: 100 INJECTION, SOLUTION INTRAVENOUS; SUBCUTANEOUS at 08:10

## 2024-10-12 RX ADMIN — INSULIN GLARGINE 15 UNITS: 100 INJECTION, SOLUTION SUBCUTANEOUS at 08:10

## 2024-10-12 RX ADMIN — CLOPIDOGREL BISULFATE 75 MG: 75 TABLET ORAL at 08:10

## 2024-10-12 RX ADMIN — LISINOPRIL 10 MG: 10 TABLET ORAL at 08:10

## 2024-10-12 RX ADMIN — ENOXAPARIN SODIUM 70 MG: 80 INJECTION SUBCUTANEOUS at 03:10

## 2024-10-12 RX ADMIN — HEPARIN SODIUM 5000 UNITS: 5000 INJECTION INTRAVENOUS; SUBCUTANEOUS at 06:10

## 2024-10-12 RX ADMIN — HEPARIN SODIUM 5000 UNITS: 5000 INJECTION INTRAVENOUS; SUBCUTANEOUS at 02:10

## 2024-10-12 RX ADMIN — NITROGLYCERIN 0.4 MG: 0.4 TABLET, ORALLY DISINTEGRATING SUBLINGUAL at 02:10

## 2024-10-12 RX ADMIN — ALUMINUM HYDROXIDE, MAGNESIUM HYDROXIDE, AND SIMETHICONE 30 ML: 200; 200; 20 SUSPENSION ORAL at 06:10

## 2024-10-12 RX ADMIN — INSULIN ASPART 7 UNITS: 100 INJECTION, SOLUTION INTRAVENOUS; SUBCUTANEOUS at 12:10

## 2024-10-12 RX ADMIN — ONDANSETRON 8 MG: 8 TABLET, ORALLY DISINTEGRATING ORAL at 03:10

## 2024-10-12 RX ADMIN — EZETIMIBE 10 MG: 10 TABLET ORAL at 09:10

## 2024-10-12 RX ADMIN — ACETAMINOPHEN 650 MG: 325 TABLET ORAL at 04:10

## 2024-10-12 RX ADMIN — INSULIN GLARGINE 15 UNITS: 100 INJECTION, SOLUTION SUBCUTANEOUS at 09:10

## 2024-10-12 RX ADMIN — ACETAMINOPHEN 650 MG: 325 TABLET ORAL at 09:10

## 2024-10-12 RX ADMIN — ISOSORBIDE MONONITRATE 30 MG: 30 TABLET, EXTENDED RELEASE ORAL at 08:10

## 2024-10-12 RX ADMIN — MUPIROCIN: 20 OINTMENT TOPICAL at 09:10

## 2024-10-12 RX ADMIN — INSULIN ASPART 3 UNITS: 100 INJECTION, SOLUTION INTRAVENOUS; SUBCUTANEOUS at 09:10

## 2024-10-12 RX ADMIN — INSULIN ASPART 5 UNITS: 100 INJECTION, SOLUTION INTRAVENOUS; SUBCUTANEOUS at 08:10

## 2024-10-12 RX ADMIN — HYPROMELLOSE 2910 1 DROP: 5 SOLUTION OPHTHALMIC at 09:10

## 2024-10-12 RX ADMIN — ASPIRIN 81 MG: 81 TABLET, COATED ORAL at 06:10

## 2024-10-12 RX ADMIN — METOPROLOL TARTRATE 25 MG: 25 TABLET, FILM COATED ORAL at 09:10

## 2024-10-12 RX ADMIN — MUPIROCIN: 20 OINTMENT TOPICAL at 08:10

## 2024-10-12 RX ADMIN — DIPHENHYDRAMINE HYDROCHLORIDE, ZINC ACETATE: 2; .1 CREAM TOPICAL at 10:10

## 2024-10-12 NOTE — PLAN OF CARE
Problem: Adult Inpatient Plan of Care  Goal: Plan of Care Review  Outcome: Progressing  Chart check complete. Vitals, orders, labs, and progress notes reviewed. Care plan updated. Will monitor.

## 2024-10-12 NOTE — ASSESSMENT & PLAN NOTE
Patient with known CAD with no intervention based on patient's wishes, which is uncontrolled Will continue ASA, Plavix, and Statin and monitor for S/Sx of angina/ACS. Continue to monitor on telemetry.     [facility] 10/9  Mid LAD 50-60% stenosis   Diagonal 3 with 95-99% stenosis angiographically  Cardiac Arrest unknown etiology- case stopped once patient was HD stable     10/11  Assessment:   Successful PCI of D3 with 2.5 x 12 mm MENG  Negative iFr of mLAD (0.92)- will medically manage     Plan:  - Patient tolerated procedure well. No immediate complications  - Continue aspirin and Plavix for 1 year, then aspirin for life  - EKG when arrives to floor  - Routine post cath protocol  - Maximize medical management  - Further care by the primary team  - IVF at  100cc/hr  for 2 hours  - Aggressive Lipid lowering medicine with repatha.  - Follow up with me  Plan PER CARDIOLOGY:   - Continue current medication regimen  - EKG when arrives to floor  - Routine post cath protocol  - Intubated/sedated - PCCM following  - IVF at  100cc/hr  for 3 hours  -planning to state iFR of mLAD and PCI of D3 of she is HD stable

## 2024-10-12 NOTE — PROGRESS NOTES
"[facility]  Progress Note    Patient Name: Terra Mcpherson  MRN: [id number]  Patient Class: IP- Inpatient   Admission Date: 10/8/2024  Length of Stay: 3 days  Attending Physician: Kristan Santana MD  Primary Care Provider: Pola Machado MD        Subjective:     Principal Problem:Chest pain        HPI:  The patient was a 69-year-old female with a past medical history of diabetes, hyperlipidemia, CAD, GERD and cataracts.  The patient was transferred from [place] for cardiac evaluation.  She presented to the hospital due to retrosternal chest pain that had lasted 2 days.  The patient was describes the pain as feeling like "a heavy brick is sitting on my chest".  She states the pain started on Sunday and was between a 3-4/10.  She described it as radiating to her bilateral upper arms, shoulders, armpit, behind her ears and between her shoulder blades.  She stated by today, the pain was 8/10.  She had a similar episode in July and had a CT angiogram done. Results showed: CAD-RADS 2. Diffuse atherosclerosis with long segment in mid LAD of 50-70% stenosis. Coronary calcium score 89.  She was continued on baby aspirin.  At the time she declined medical interventional therapy.  The patient had a left heart catheterization done about 20 years ago which was negative.  Due to her current symptoms she was transferred to [facility] for evaluation and angiogram by cardiology team.    On arrival, the patient was vitals has been stable.  Troponin is within normal limits but lipid panel elevated. EKG showed NSR rate 69, normal EKG, QTc 435 ms.  Patient was to be admitted to telemetry for further evaluation by Cardiology.    Overview/Hospital Course:  On 10/09 patient was taken to cardiac cath for [facility], patient rapidly decompensated and became shortness of breath EKG changes from SD to PA arrest 2 PT, patient received epi twice with IV amiodarone Ross was achieved patient intubated in the " cath lab transferred to ICU started on Levophed drip with resolution of ST changes after Cardiology discussion with her son mentioned that the patient has multiple allergies unusually prone to have bad allergic reaction is any clear cause of the cardiac arrest initial assessment or LHC:  Assessment:   Mid LAD 50-60% stenosis   Diagonal 3 with 95-99% stenosis angiographically  Cardiac Arrest unknown etiology- case stopped once patient was HD stable  Cardiology planning for staging IFR of mLAD of D3 once she is hemodynamically stable         Interval History:  Patient is stable but feeling generally weak and dehydrated patient was encouraged for more ala intake.  Patient would like to stay overnight to get stronger in the morning before being discharged    Review of Systems   Constitutional:  Negative for activity change, chills and fever.   HENT:  Negative for congestion, rhinorrhea and sore throat.    Eyes:  Negative for discharge and redness.   Respiratory:  Negative for cough, chest tightness, shortness of breath and wheezing.    Cardiovascular:  Negative for chest pain, palpitations and leg swelling.   Gastrointestinal:  Negative for abdominal pain, constipation, diarrhea, nausea and vomiting.   Genitourinary:  Negative for dysuria, flank pain and hematuria.   Musculoskeletal:  Negative for back pain, myalgias and neck pain.   Skin:  Negative for pallor, rash and wound.   Neurological:  Negative for dizziness, tremors, syncope, weakness, numbness and headaches.   Psychiatric/Behavioral:  Negative for agitation, confusion and hallucinations.      Objective:     Vital Signs (Most Recent):  Temp: 97.6 °F (36.4 °C) (10/12/24 1131)  Pulse: 68 (10/12/24 1131)  Resp: 18 (10/12/24 1131)  BP: (!) 116/56 (10/12/24 1131)  SpO2: 99 % (10/12/24 1131) Vital Signs (24h Range):  Temp:  [97.6 °F (36.4 °C)-98.8 °F (37.1 °C)] 97.6 °F (36.4 °C)  Pulse:  [55-76] 68  Resp:  [16-48] 18  SpO2:  [92 %-99 %] 99 %  BP: (111-142)/(56-70)  116/56     Weight: 72 kg (158 lb 11.7 oz)  Body mass index is 26.41 kg/m².  No intake or output data in the 24 hours ending 10/12/24 1347      Physical Exam  Vitals and nursing note reviewed.   Constitutional:       General: She is not in acute distress.  HENT:      Head: Normocephalic and atraumatic.      Mouth/Throat:      Mouth: Mucous membranes are moist.   Eyes:      General:         Right eye: No discharge.         Left eye: No discharge.      Conjunctiva/sclera: Conjunctivae normal.   Cardiovascular:      Rate and Rhythm: Normal rate and regular rhythm.      Pulses: Normal pulses.   Pulmonary:      Effort: Pulmonary effort is normal.      Breath sounds: Normal breath sounds.   Abdominal:      General: Bowel sounds are normal.      Palpations: Abdomen is soft.   Musculoskeletal:         General: No swelling. Normal range of motion.      Cervical back: Normal range of motion and neck supple.   Skin:     General: Skin is warm and dry.   Neurological:      Mental Status: She is alert and oriented to person, place, and time. Mental status is at baseline.   Psychiatric:         Mood and Affect: Mood normal.             Significant Labs: All pertinent labs within the past 24 hours have been reviewed.  Recent Lab Results  (Last 5 results in the past 24 hours)        10/12/24  1130   10/12/24  0607   10/11/24  2044   10/11/24  1622   10/11/24  1615        POCT Glucose 147   245   334   354   381                              Significant Imaging: I have reviewed all pertinent imaging results/findings within the past 24 hours.    Assessment/Plan:      * Chest pain  -patient complained of feeling like there is a heavy brick on her chest  -troponins negative  -cardiac monitor  -7/14/2024 CT angiogram of the coronary arteries demonstrated Diffuse atherosclerosis with long segment in mid LAD of 50-70% stenosis.Coronary calcium score 89.   -continue with aspirin, beta-blocker and isosorbide  -cardiology consult  placed  -patient is scheduled for [facility] today 10/9, patient crashed during the procedure with PEA after 2 epi, and and DC shock, patient achieved rods patient was intubated for to ICU for overnight, patient extubated on 10/10 of drips patient was downgraded telemedicine not sure what could cause the cardiac arrest.  Cardiology planning for Staging iFR of mLAD and PCI of D3 of she is HD stable  10/11  Assessment:   Successful PCI of D3 with 2.5 x 12 mm MENG  Negative iFr of mLAD (0.92)- will medically manage     Plan per cardiology:  - Patient tolerated procedure well. No immediate complications  - Continue aspirin and Plavix for 1 year, then aspirin for life  - EKG when arrives to floor  - Routine post cath protocol  - Maximize medical management  - Further care by the primary team  - IVF at  100cc/hr  for 2 hours  - Aggressive Lipid lowering medicine with repatha.  - Follow up with me    Cardiac arrest  10/9 during [facility] patient had PEA , 2 cycles of Epi, and DC shock , patient has achieved ROSC was intubated and admitted to ICU overnight on Levophed drip.  On 10/10 patient was successfully extubated of drips and was downgraded to telemedicine    Coronary artery disease involving native coronary artery of native heart with unstable angina pectoris  Patient with known CAD with no intervention based on patient's wishes, which is uncontrolled Will continue ASA, Plavix, and Statin and monitor for S/Sx of angina/ACS. Continue to monitor on telemetry.     [facility] 10/9  Mid LAD 50-60% stenosis   Diagonal 3 with 95-99% stenosis angiographically  Cardiac Arrest unknown etiology- case stopped once patient was HD stable     10/11  Assessment:   Successful PCI of D3 with 2.5 x 12 mm MENG  Negative iFr of mLAD (0.92)- will medically manage     Plan:  - Patient tolerated procedure well. No immediate complications  - Continue aspirin and Plavix for 1 year, then aspirin for life  - EKG when arrives to floor  - Routine post cath protocol  -  Maximize medical management  - Further care by the primary team  - IVF at  100cc/hr  for 2 hours  - Aggressive Lipid lowering medicine with repatha.  - Follow up with me  Plan PER CARDIOLOGY:   - Continue current medication regimen  - EKG when arrives to floor  - Routine post cath protocol  - Intubated/sedated - PCCM following  - IVF at  100cc/hr  for 3 hours  -planning to state iFR of mLAD and PCI of D3 of she is HD stable    Mixed hyperlipidemia  -continue with home meds  - Aggressive Lipid lowering medicine with repatha.  - Follow up with me    Type 2 diabetes mellitus with diabetic polyneuropathy, with long-term current use of insulin  -continue with sliding scale as patient is NPO  -glucometer checks  -A1c 7.5        VTE Risk Mitigation (From admission, onward)           Ordered     heparin infusion 1,000 units/500 ml in 0.9% NaCl (pressure line flush)  Intra-op continuous PRN         10/11/24 0853     heparin (porcine) injection 5,000 Units  Every 8 hours         10/08/24 2235     IP VTE HIGH RISK PATIENT  Once         10/08/24 1930     Place sequential compression device  Until discontinued         10/08/24 1930     Place MADALYN hose  Until discontinued         10/08/24 1930                    Discharge Planning   ALFREDA: 10/12/2024     Code Status: Full Code   Is the patient medically ready for discharge?:     Reason for patient still in hospital (select all that apply): Patient unstable  Discharge Plan A: Home   Discharge Delays: None known at this time              Kristan Wright MD  Department of Hospital Medicine   [place] - Telemetry

## 2024-10-12 NOTE — SUBJECTIVE & OBJECTIVE
Interval History:  Patient is stable but feeling generally weak and dehydrated patient was encouraged for more ala intake.  Patient would like to stay overnight to get stronger in the morning before being discharged    Review of Systems   Constitutional:  Negative for activity change, chills and fever.   HENT:  Negative for congestion, rhinorrhea and sore throat.    Eyes:  Negative for discharge and redness.   Respiratory:  Negative for cough, chest tightness, shortness of breath and wheezing.    Cardiovascular:  Negative for chest pain, palpitations and leg swelling.   Gastrointestinal:  Negative for abdominal pain, constipation, diarrhea, nausea and vomiting.   Genitourinary:  Negative for dysuria, flank pain and hematuria.   Musculoskeletal:  Negative for back pain, myalgias and neck pain.   Skin:  Negative for pallor, rash and wound.   Neurological:  Negative for dizziness, tremors, syncope, weakness, numbness and headaches.   Psychiatric/Behavioral:  Negative for agitation, confusion and hallucinations.      Objective:     Vital Signs (Most Recent):  Temp: 97.6 °F (36.4 °C) (10/12/24 1131)  Pulse: 68 (10/12/24 1131)  Resp: 18 (10/12/24 1131)  BP: (!) 116/56 (10/12/24 1131)  SpO2: 99 % (10/12/24 1131) Vital Signs (24h Range):  Temp:  [97.6 °F (36.4 °C)-98.8 °F (37.1 °C)] 97.6 °F (36.4 °C)  Pulse:  [55-76] 68  Resp:  [16-48] 18  SpO2:  [92 %-99 %] 99 %  BP: (111-142)/(56-70) 116/56     Weight: 72 kg (158 lb 11.7 oz)  Body mass index is 26.41 kg/m².  No intake or output data in the 24 hours ending 10/12/24 1347      Physical Exam  Vitals and nursing note reviewed.   Constitutional:       General: She is not in acute distress.  HENT:      Head: Normocephalic and atraumatic.      Mouth/Throat:      Mouth: Mucous membranes are moist.   Eyes:      General:         Right eye: No discharge.         Left eye: No discharge.      Conjunctiva/sclera: Conjunctivae normal.   Cardiovascular:      Rate and Rhythm: Normal rate  and regular rhythm.      Pulses: Normal pulses.   Pulmonary:      Effort: Pulmonary effort is normal.      Breath sounds: Normal breath sounds.   Abdominal:      General: Bowel sounds are normal.      Palpations: Abdomen is soft.   Musculoskeletal:         General: No swelling. Normal range of motion.      Cervical back: Normal range of motion and neck supple.   Skin:     General: Skin is warm and dry.   Neurological:      Mental Status: She is alert and oriented to person, place, and time. Mental status is at baseline.   Psychiatric:         Mood and Affect: Mood normal.             Significant Labs: All pertinent labs within the past 24 hours have been reviewed.  Recent Lab Results  (Last 5 results in the past 24 hours)        10/12/24  1130   10/12/24  0607   10/11/24  2044   10/11/24  1622   10/11/24  1615        POCT Glucose 147   245   334   354   381                              Significant Imaging: I have reviewed all pertinent imaging results/findings within the past 24 hours.

## 2024-10-12 NOTE — ASSESSMENT & PLAN NOTE
10/9 during [facility] patient had PEA , 2 cycles of Epi, and DC shock , patient has achieved ROSC was intubated and admitted to ICU overnight on Levophed drip.  On 10/10 patient was successfully extubated of drips and was downgraded to telemedicine

## 2024-10-12 NOTE — ASSESSMENT & PLAN NOTE
-patient complained of feeling like there is a heavy brick on her chest  -troponins negative  -cardiac monitor  -7/14/2024 CT angiogram of the coronary arteries demonstrated Diffuse atherosclerosis with long segment in mid LAD of 50-70% stenosis.Coronary calcium score 89.   -continue with aspirin, beta-blocker and isosorbide  -cardiology consult placed  -patient is scheduled for [facility] today 10/9, patient crashed during the procedure with PEA after 2 epi, and and DC shock, patient achieved rods patient was intubated for to ICU for overnight, patient extubated on 10/10 of drips patient was downgraded telemedicine not sure what could cause the cardiac arrest.  Cardiology planning for Staging iFR of mLAD and PCI of D3 of she is HD stable  10/11  Assessment:   Successful PCI of D3 with 2.5 x 12 mm MENG  Negative iFr of mLAD (0.92)- will medically manage     Plan per cardiology:  - Patient tolerated procedure well. No immediate complications  - Continue aspirin and Plavix for 1 year, then aspirin for life  - EKG when arrives to floor  - Routine post cath protocol  - Maximize medical management  - Further care by the primary team  - IVF at  100cc/hr  for 2 hours  - Aggressive Lipid lowering medicine with repatha.  - Follow up with me

## 2024-10-12 NOTE — PROGRESS NOTES
"[facility] Medicine  Progress Note    Patient Name: Terra Mcpherson  MRN: [id number]  Patient Class: IP- Inpatient   Admission Date: 10/8/2024  Length of Stay: 2 days  Attending Physician: Kristan Santana MD  Primary Care Provider: Pola Machado MD        Subjective:     Principal Problem:Chest pain        HPI:  The patient was a 69-year-old female with a past medical history of diabetes, hyperlipidemia, CAD, GERD and cataracts.  The patient was transferred from [place] for cardiac evaluation.  She presented to the hospital due to retrosternal chest pain that had lasted 2 days.  The patient was describes the pain as feeling like "a heavy brick is sitting on my chest".  She states the pain started on Sunday and was between a 3-4/10.  She described it as radiating to her bilateral upper arms, shoulders, armpit, behind her ears and between her shoulder blades.  She stated by today, the pain was 8/10.  She had a similar episode in July and had a CT angiogram done. Results showed: CAD-RADS 2. Diffuse atherosclerosis with long segment in mid LAD of 50-70% stenosis. Coronary calcium score 89.  She was continued on baby aspirin.  At the time she declined medical interventional therapy.  The patient had a left heart catheterization done about 20 years ago which was negative.  Due to her current symptoms she was transferred to [facility] for evaluation and angiogram by cardiology team.    On arrival, the patient was vitals has been stable.  Troponin is within normal limits but lipid panel elevated. EKG showed NSR rate 69, normal EKG, QTc 435 ms.  Patient was to be admitted to telemetry for further evaluation by Cardiology.    Overview/Hospital Course:  On 10/09 patient was taken to cardiac [facility], patient rapidly decompensated and became shortness of breath EKG changes from SD to PA arrest 2 PT, patient received epi twice with IV amiodarone Ross was achieved patient intubated in the " cath lab transferred to ICU started on Levophed drip with resolution of ST changes after Cardiology discussion with her son mentioned that the patient has multiple allergies unusually prone to have bad allergic reaction is any clear cause of the cardiac arrest initial assessment or LHC:  Assessment:   Mid LAD 50-60% stenosis   Diagonal 3 with 95-99% stenosis angiographically  Cardiac Arrest unknown etiology- case stopped once patient was HD stable  Cardiology planning for staging IFR of mLAD of D3 once she is hemodynamically stable         Interval History:   Patient was seen post cardiac catheterization in the ICU for monitoring denies any chest pain patient had stent placed.  Later during the day patient was downgraded to acute care unit for further monitoring    Review of Systems   Constitutional:  Negative for activity change, chills and fever.   HENT:  Negative for congestion, rhinorrhea and sore throat.    Eyes:  Negative for discharge and redness.   Respiratory:  Negative for cough, chest tightness, shortness of breath and wheezing.    Cardiovascular:  Negative for chest pain, palpitations and leg swelling.   Gastrointestinal:  Negative for abdominal pain, constipation, diarrhea, nausea and vomiting.   Genitourinary:  Negative for dysuria, flank pain and hematuria.   Musculoskeletal:  Negative for back pain, myalgias and neck pain.   Skin:  Negative for pallor, rash and wound.   Neurological:  Negative for dizziness, tremors, syncope, weakness, numbness and headaches.   Psychiatric/Behavioral:  Negative for agitation, confusion and hallucinations.      Objective:     Vital Signs (Most Recent):  Temp: 97.6 °F (36.4 °C) (10/11/24 1811)  Pulse: 64 (10/11/24 1811)  Resp: 18 (10/11/24 1811)  BP: 133/63 (10/11/24 1811)  SpO2: 95 % (10/11/24 1811) Vital Signs (24h Range):  Temp:  [97 °F (36.1 °C)-98.8 °F (37.1 °C)] 97.6 °F (36.4 °C)  Pulse:  [53-78] 64  Resp:  [11-48] 18  SpO2:  [91 %-100 %] 95 %  BP:  ()/(52-79) 133/63     Weight: 72 kg (158 lb 11.7 oz)  Body mass index is 26.41 kg/m².    Intake/Output Summary (Last 24 hours) at 10/11/2024 1917  Last data filed at 10/11/2024 0800  Gross per 24 hour   Intake 17.41 ml   Output --   Net 17.41 ml         Physical Exam  Vitals and nursing note reviewed.   Constitutional:       General: She is not in acute distress.  HENT:      Head: Normocephalic and atraumatic.      Mouth/Throat:      Mouth: Mucous membranes are moist.   Eyes:      General:         Right eye: No discharge.         Left eye: No discharge.      Conjunctiva/sclera: Conjunctivae normal.   Cardiovascular:      Rate and Rhythm: Normal rate and regular rhythm.      Pulses: Normal pulses.   Pulmonary:      Effort: Pulmonary effort is normal.      Breath sounds: Normal breath sounds.   Abdominal:      General: Bowel sounds are normal.      Palpations: Abdomen is soft.   Musculoskeletal:         General: No swelling. Normal range of motion.      Cervical back: Normal range of motion and neck supple.   Skin:     General: Skin is warm and dry.   Neurological:      Mental Status: She is alert and oriented to person, place, and time. Mental status is at baseline.   Psychiatric:         Mood and Affect: Mood normal.             Significant Labs: All pertinent labs within the past 24 hours have been reviewed.  Recent Lab Results  (Last 5 results in the past 24 hours)        10/11/24  1622   10/11/24  1615   10/11/24  1134   10/11/24  0918   10/11/24  0737        POC ACTIVATED CLOTTING TIME K       269         POCT Glucose 354   381   283     238       Sample       ARTERIAL                                Significant Imaging: I have reviewed all pertinent imaging results/findings within the past 24 hours.    Assessment/Plan:      * Chest pain  -patient complained of feeling like there is a heavy brick on her chest  -troponins negative  -cardiac monitor  -7/14/2024 CT angiogram of the coronary arteries  demonstrated Diffuse atherosclerosis with long segment in mid LAD of 50-70% stenosis.Coronary calcium score 89.   -continue with aspirin, beta-blocker and isosorbide  -cardiology consult placed  -patient is scheduled for [facility] today 10/9, patient crashed during the procedure with PEA after 2 epi, and and DC shock, patient achieved rods patient was intubated for to ICU for overnight, patient extubated on 10/10 of drips patient was downgraded telemedicine not sure what could cause the cardiac arrest.  Cardiology planning for Staging iFR of mLAD and PCI of D3 of she is HD stable  10/11  Assessment:   Successful PCI of D3 with 2.5 x 12 mm MENG  Negative iFr of mLAD (0.92)- will medically manage     Plan per cardiology:  - Patient tolerated procedure well. No immediate complications  - Continue aspirin and Plavix for 1 year, then aspirin for life  - EKG when arrives to floor  - Routine post cath protocol  - Maximize medical management  - Further care by the primary team  - IVF at  100cc/hr  for 2 hours  - Aggressive Lipid lowering medicine with repatha.  - Follow up with me    Cardiac arrest  10/9 during [facility] patient had PEA , 2 cycles of Epi, and DC shock , patient has achieved ROSC was intubated and admitted to ICU overnight on Levophed drip.  On 10/10 patient was successfully extubated of drips and was downgraded to telemedicine    Coronary artery disease involving native coronary artery of native heart with unstable angina pectoris  Patient with known CAD with no intervention based on patient's wishes, which is uncontrolled Will continue ASA, Plavix, and Statin and monitor for S/Sx of angina/ACS. Continue to monitor on telemetry.     [facility] 10/9  Mid LAD 50-60% stenosis   Diagonal 3 with 95-99% stenosis angiographically  Cardiac Arrest unknown etiology- case stopped once patient was HD stable     10/11  Assessment:   Successful PCI of D3 with 2.5 x 12 mm MENG  Negative iFr of mLAD (0.92)- will medically manage     Plan:  -  Patient tolerated procedure well. No immediate complications  - Continue aspirin and Plavix for 1 year, then aspirin for life  - EKG when arrives to floor  - Routine post cath protocol  - Maximize medical management  - Further care by the primary team  - IVF at  100cc/hr  for 2 hours  - Aggressive Lipid lowering medicine with repatha.  - Follow up with me  Plan PER CARDIOLOGY:   - Continue current medication regimen  - EKG when arrives to floor  - Routine post cath protocol  - Intubated/sedated - PCCM following  - IVF at  100cc/hr  for 3 hours  -planning to state iFR of mLAD and PCI of D3 of she is HD stable    Mixed hyperlipidemia  -continue with home meds  - Aggressive Lipid lowering medicine with repatha.  - Follow up with me    Type 2 diabetes mellitus with diabetic polyneuropathy, with long-term current use of insulin  -continue with sliding scale as patient is NPO  -glucometer checks  -A1c 7.5        VTE Risk Mitigation (From admission, onward)           Ordered     heparin infusion 1,000 units/500 ml in 0.9% NaCl (pressure line flush)  Intra-op continuous PRN         10/11/24 0853     heparin (porcine) injection 5,000 Units  Every 8 hours         10/08/24 2235     IP VTE HIGH RISK PATIENT  Once         10/08/24 1930     Place sequential compression device  Until discontinued         10/08/24 1930     Place MADALYN hose  Until discontinued         10/08/24 1930                    Discharge Planning   ALFREDA: 10/8/2024     Code Status: Full Code   Is the patient medically ready for discharge?:     Reason for patient still in hospital (select all that apply): Patient unstable  Discharge Plan A: Home                  Kristan Wright MD  Department of Hospital Medicine   [place] [facility]

## 2024-10-12 NOTE — SUBJECTIVE & OBJECTIVE
Interval History:   Patient was seen post cardiac catheterization in the ICU for monitoring denies any chest pain patient had stent placed.  Later during the day patient was downgraded to acute care unit for further monitoring    Review of Systems   Constitutional:  Negative for activity change, chills and fever.   HENT:  Negative for congestion, rhinorrhea and sore throat.    Eyes:  Negative for discharge and redness.   Respiratory:  Negative for cough, chest tightness, shortness of breath and wheezing.    Cardiovascular:  Negative for chest pain, palpitations and leg swelling.   Gastrointestinal:  Negative for abdominal pain, constipation, diarrhea, nausea and vomiting.   Genitourinary:  Negative for dysuria, flank pain and hematuria.   Musculoskeletal:  Negative for back pain, myalgias and neck pain.   Skin:  Negative for pallor, rash and wound.   Neurological:  Negative for dizziness, tremors, syncope, weakness, numbness and headaches.   Psychiatric/Behavioral:  Negative for agitation, confusion and hallucinations.      Objective:     Vital Signs (Most Recent):  Temp: 97.6 °F (36.4 °C) (10/11/24 1811)  Pulse: 64 (10/11/24 1811)  Resp: 18 (10/11/24 1811)  BP: 133/63 (10/11/24 1811)  SpO2: 95 % (10/11/24 1811) Vital Signs (24h Range):  Temp:  [97 °F (36.1 °C)-98.8 °F (37.1 °C)] 97.6 °F (36.4 °C)  Pulse:  [53-78] 64  Resp:  [11-48] 18  SpO2:  [91 %-100 %] 95 %  BP: ()/(52-79) 133/63     Weight: 72 kg (158 lb 11.7 oz)  Body mass index is 26.41 kg/m².    Intake/Output Summary (Last 24 hours) at 10/11/2024 1917  Last data filed at 10/11/2024 0800  Gross per 24 hour   Intake 17.41 ml   Output --   Net 17.41 ml         Physical Exam  Vitals and nursing note reviewed.   Constitutional:       General: She is not in acute distress.  HENT:      Head: Normocephalic and atraumatic.      Mouth/Throat:      Mouth: Mucous membranes are moist.   Eyes:      General:         Right eye: No discharge.         Left eye: No  discharge.      Conjunctiva/sclera: Conjunctivae normal.   Cardiovascular:      Rate and Rhythm: Normal rate and regular rhythm.      Pulses: Normal pulses.   Pulmonary:      Effort: Pulmonary effort is normal.      Breath sounds: Normal breath sounds.   Abdominal:      General: Bowel sounds are normal.      Palpations: Abdomen is soft.   Musculoskeletal:         General: No swelling. Normal range of motion.      Cervical back: Normal range of motion and neck supple.   Skin:     General: Skin is warm and dry.   Neurological:      Mental Status: She is alert and oriented to person, place, and time. Mental status is at baseline.   Psychiatric:         Mood and Affect: Mood normal.             Significant Labs: All pertinent labs within the past 24 hours have been reviewed.  Recent Lab Results  (Last 5 results in the past 24 hours)        10/11/24  1622   10/11/24  1615   10/11/24  1134   10/11/24  0918   10/11/24  0737        POC ACTIVATED CLOTTING TIME K       269         POCT Glucose 354   381   283     238       Sample       ARTERIAL                                Significant Imaging: I have reviewed all pertinent imaging results/findings within the past 24 hours.

## 2024-10-12 NOTE — PLAN OF CARE
10/12/24 1238   Final Note   Assessment Type Final Discharge Note   Anticipated Discharge Disposition Home   What phone number can be called within the next 1-3 days to see how you are doing after discharge? [phone]   Post-Acute Status   Discharge Delays None known at this time      spoke with pt daughter, Lety, regarding discharge plan to home. Daughter was at bedside and informed SW she would transport pt home at D/C. No needs at this time.    Follow up appointment scheduled below.     Future Appointments   Date Time Provider Department Center   10/22/2024 10:00 AM Yolanda Escudero MD [facility] IMPRI [place] [facility]   10/23/2024  1:40 PM Jesus Gurrola MD [facility] CARDIO [place] [facility]   11/27/2024  8:00 AM Chris Moss MD [facility]

## 2024-10-12 NOTE — PROGRESS NOTES
[facility] Progress Note     Attending Physician: Kristan Santana MD  Cardiology Attending Physician: Jorje Mcdermott MD  Date of Admit: 10/8/2024    Interval/Subjective   Doing well today. No events on tele. Mild soreness at RFA access site.     Objective   Objective:   Last 24 Hour Vital Signs:  BP  Min: 101/55  Max: 142/68  Temp  Av °F (36.7 °C)  Min: 97.6 °F (36.4 °C)  Max: 98.8 °F (37.1 °C)  Pulse  Av  Min: 55  Max: 78  Resp  Av.5  Min: 16  Max: 48  SpO2  Av.3 %  Min: 92 %  Max: 99 %  I/O last 3 completed shifts:  In: 17.4 [I.V.:17.4]  Out: -   Physical Examination:  Constitutional: NAD, Atraumatic   HEENT: MMM  Cardiovascular: RRR, no murmurs noted, no chest tenderness to palpation, 2+ radial pulses b/l  Pulmonary: normal respiratory effort, CTAB, no crackles, wheezes  Abdominal: S/NT/ND  Musculoskeletal: No lower extremity edema noted  Neurological: Alert & oriented to self, location, time and situation. No gross neurological deficits  Skin: W/D/I  Psych: Appropriate affect, normal mood    Laboratory:  Personally reviewed    Other Results:  TTE:  Results for orders placed during the hospital encounter of 10/08/24    Echo    Interpretation Summary    Left Ventricle: Mild global hypokinesis present. There is low normal systolic function with a visually estimated ejection fraction of 50 - 55%.    Right Ventricle: Normal right ventricular cavity size. Wall thickness is normal. Systolic function is normal.    IVC/SVC: Patient is ventilated, cannot use IVC diameter to estimate right atrial pressure.    Stress Testing:   Results for orders placed during the hospital encounter of 24    Exercise Stress - EKG    Interpretation Summary    The patient exercised for 6 minutes 5 seconds on a Benedict protocol, corresponding to a functional capacity of 7METS, achieving a peak heart rate of 151 bpm, which is 105% of the age predicted maximum heart rate.    The ECG portion of the study is  abnormal but not diagnostic.    The patient reported no chest pain during the stress test.     Coronary Angiogram:  Results for orders placed during the hospital encounter of 10/08/24    Cardiac catheterization (Needs Review)  This result has not been signed. Information might be incomplete.    Conclusion  Table formatting from the original result was not included.      The estimated blood loss was none.    [facility] Intensive Care  Surgery Department  Operative Note    SUMMARY  POST CATH NOTE    Date of Procedure: 10/11/2024    Procedure: Procedure(s) (LRB):  Left heart cath (Left)  INSERTION, STENT, CORONARY ARTERY (Left)  Instantaneous Wave-Free Ratio (IFR) (Left)  Percutaneous coronary intervention (N/A)  ANGIOGRAM, CORONARY ARTERY (N/A)    Surgeons and Role:  * Jesus Gurrola MD - Primary    Assisting Surgeon: None    Pre-Operative Diagnosis: Coronary artery disease involving native coronary artery of native heart with unstable angina pectoris [I25.110]    Post-Operative Diagnosis: Post-Op Diagnosis Codes:  * Coronary artery disease involving native coronary artery of native heart with unstable angina pectoris [I25.110]    s/p catheterization secondary to:    Cath Results:  Access: US guided RCFA    Coronary Anatomy:    Left Main Coronary Artery: The left main is a large and short caliber vessel arising normally from the left sinus of valsalva.  It bifurcates into the left anterior descending and left circumflex coronary artery.  It is free of evidence of obstructive coronary artery disease. JUANITA III flow    Left Anterior Descending: The left anterior descending coronary artery is a large caliber vessel arising normally from the left main and extending to the apex.  It gives rise to  caliber diagonal arteries. Mid LAD with 50-60% stenosis, D3 with proximal 99% stenosis.    Left Circumflex: The left circumflex is a large caliber vessel arising normally from the left main coronary artery, it is co-dominant.  " It gives rise to moderate caliber obtuse marginal branches. OM1 with 20-30% stenosis.  The circumflex system is free of obstructive coronary artery disease. JUANITA III flow      Intervention:  PCI procedure:  Heparin administered. ACT was closely monitored. Using a JL 4 guider, this was used to cannulate the left system. Runthrough PCI wire repshaped outside the body and advanced to D3. Predilated at nominal pressure with 2.5 mm SC. Advanced a 2.5 x 12 mm MENG at deployed at nominal pressure with great results. Post dilated with 2.5 x 8 mm NC at nominal pressure. After the balloon was removed.  The wire was left in place.  Repeat angiography showed no signs of dissection.  Subsequently the wire was pulled back. Final angiography showed JUANITA-3 flow.  No signs of dissection, perforation, or thrombus.    iFR  The 0.014" Omniwire was connected to the console, calibrated and equalized. The 0.014"  Omniwire was then advanced into the left main outside of the catheter and flushed with saline  with the introducer removed. The pressure was normalized and then the wire was advanced  across D3 the lesion. 0.92 and 0.93 iFR recordings were obtained. The 0.014" Omniwire was then pulled  back slowly across the lesion to assess for step up and electronic drift. The wire was removed,  and final angiography was performed.      Closure device: Perclose  Patient tolerated procedure well, no complications    Post Cath Exam:  BP (!) 112/57   Pulse (!) 57   Temp 97 °F (36.1 °C) (Skin)   Resp 14   Ht 5' 5" (1.651 m)   Wt 72 kg (158 lb 11.7 oz)   SpO2 (!) 91%   BMI 26.41 kg/m²    Anesthesia: Monitor Anesthesia Care      Estimated Blood Loss (EBL): * No values recorded between 10/11/2024  8:31 AM and 10/11/2024  9:14 AM *    Specimens:  Specimen (24h ago, onward)    None            Assessment:  Successful PCI of D3 with 2.5 x 12 mm MENG  Negative iFr of mLAD (0.92)- will medically manage    Plan:  - Patient tolerated procedure well. No " immediate complications  - Continue aspirin and Plavix for 1 year, then aspirin for life  - EKG when arrives to floor  - Routine post cath protocol  - Maximize medical management  - Further care by the primary team  - IVF at  100cc/hr  for 2 hours  - Aggressive Lipid lowering medicine with repatha.  - Follow up with me      51 minutes were spent on this visit including time personally:  -Reviewing Medical records & lab results  -Independently reviewing and interpreting (if not documented by myself) EKGs, echocardiograms, catherizations   -Obtaining a history, performing a relevant exam, counseling/educating the patient/family  -Documenting clinical information in the EMR including ordering of tests  -Care coordination and communicating with other health care providers         Assessment/Plan:     Active Hospital Problems    Diagnosis    *Chest pain    Cardiac arrest    Coronary artery disease involving native coronary artery of native heart with unstable angina pectoris    Type 2 diabetes mellitus with diabetic polyneuropathy, with long-term current use of insulin    Mixed hyperlipidemia       * Chest pain  - presented with chest pain concerning for USA; LHC yesterday with details as detailed under CAD s/p PCI;   - - continue Imdur, BB and ASA      Cardiac arrest  - [facility] for evaluation of USA complicated by cardiac arrest- PEA then asystole with ROSC after CPR and Epi  - uncertain of etiology of arrest ?spasm vs allergic reaction to iodine; no arrhythmias noted overnight  - echo with normal LVEF post procedure; troponin 0.808 post procedure trending up expectedly given arrest     Coronary artery disease involving native coronary artery of native heart with unstable angina pectoris  - seen previously by ABIMAEL RODARTE in [place]  - previous cardiac CTA with mid LAD 50-70%; LHC yesterday with mLAD 50-60%, D3 99%, OM 20%, RCA 30-40% s/p negative iFR to LAD and s/p successful PCI of D3 as per above  - VSS currently; chest  pain related to chest compressions;   -Continue ASA/Plavix  -Continue imdur 30 and lisinopril on discharge        Mixed hyperlipidemia  - FLP with total cholesterol 281  HDL 40   -recommend adding ezetimibe 10mg daily   - reports intolerance to statin therapy; deferred initiation of PCSK9 during office visit with VALENTINA Murphy NP  - discussed importance of  aggressive control of cholesterol needed and  discussed PCSK9 therapy and open to initiation- attempted to order  Repatha but Praluent formulary on insurance therefore anticipate Praluent         Type 2 diabetes mellitus with diabetic polyneuropathy, with long-term current use of insulin  - HgbA1c 7.5 down from 10.4  - reinforced importance of aggressive BS control  - DM managed by primary team     Thank you for the opportunity to care for this patient. Please don't hesitate to reach out with any questions/concerns,    Jorje Mcdermott MD  Interventional Cardiology  [facility]

## 2024-10-13 ENCOUNTER — NURSE TRIAGE (OUTPATIENT)
Dept: ADMINISTRATIVE | Facility: CLINIC | Age: 69
End: 2024-10-13
Payer: MEDICARE

## 2024-10-13 VITALS
SYSTOLIC BLOOD PRESSURE: 116 MMHG | HEIGHT: 65 IN | RESPIRATION RATE: 18 BRPM | OXYGEN SATURATION: 95 % | WEIGHT: 158.75 LBS | DIASTOLIC BLOOD PRESSURE: 56 MMHG | HEART RATE: 71 BPM | TEMPERATURE: 97 F | BODY MASS INDEX: 26.45 KG/M2

## 2024-10-13 LAB
ANION GAP SERPL CALC-SCNC: 10 MMOL/L (ref 8–16)
BUN SERPL-MCNC: 21 MG/DL (ref 8–23)
CALCIUM SERPL-MCNC: 9.1 MG/DL (ref 8.7–10.5)
CHLORIDE SERPL-SCNC: 104 MMOL/L (ref 95–110)
CO2 SERPL-SCNC: 23 MMOL/L (ref 23–29)
CREAT SERPL-MCNC: 0.9 MG/DL (ref 0.5–1.4)
ERYTHROCYTE [DISTWIDTH] IN BLOOD BY AUTOMATED COUNT: 13.9 % (ref 11.5–14.5)
EST. GFR  (NO RACE VARIABLE): >60 ML/MIN/1.73 M^2
GLUCOSE SERPL-MCNC: 126 MG/DL (ref 70–110)
HCT VFR BLD AUTO: 37.4 % (ref 37–48.5)
HGB BLD-MCNC: 13.8 G/DL (ref 12–16)
MAGNESIUM SERPL-MCNC: 1.8 MG/DL (ref 1.6–2.6)
MCH RBC QN AUTO: 28.2 PG (ref 27–31)
MCHC RBC AUTO-ENTMCNC: 36.9 G/DL (ref 32–36)
MCV RBC AUTO: 76 FL (ref 82–98)
PLATELET # BLD AUTO: 313 K/UL (ref 150–450)
PMV BLD AUTO: 9.7 FL (ref 9.2–12.9)
POCT GLUCOSE: 125 MG/DL (ref 70–110)
POCT GLUCOSE: 169 MG/DL (ref 70–110)
POTASSIUM SERPL-SCNC: 3.8 MMOL/L (ref 3.5–5.1)
RBC # BLD AUTO: 4.9 M/UL (ref 4–5.4)
SODIUM SERPL-SCNC: 137 MMOL/L (ref 136–145)
WBC # BLD AUTO: 14.01 K/UL (ref 3.9–12.7)

## 2024-10-13 PROCEDURE — 94761 N-INVAS EAR/PLS OXIMETRY MLT: CPT

## 2024-10-13 PROCEDURE — 25000003 PHARM REV CODE 250

## 2024-10-13 PROCEDURE — 63600175 PHARM REV CODE 636 W HCPCS

## 2024-10-13 PROCEDURE — 85027 COMPLETE CBC AUTOMATED: CPT

## 2024-10-13 PROCEDURE — 83735 ASSAY OF MAGNESIUM: CPT

## 2024-10-13 PROCEDURE — 25000003 PHARM REV CODE 250: Performed by: INTERNAL MEDICINE

## 2024-10-13 PROCEDURE — 36415 COLL VENOUS BLD VENIPUNCTURE: CPT

## 2024-10-13 PROCEDURE — 80048 BASIC METABOLIC PNL TOTAL CA: CPT

## 2024-10-13 RX ADMIN — CLOPIDOGREL BISULFATE 75 MG: 75 TABLET ORAL at 08:10

## 2024-10-13 RX ADMIN — INSULIN GLARGINE 15 UNITS: 100 INJECTION, SOLUTION SUBCUTANEOUS at 08:10

## 2024-10-13 RX ADMIN — ENOXAPARIN SODIUM 70 MG: 80 INJECTION SUBCUTANEOUS at 06:10

## 2024-10-13 RX ADMIN — ASPIRIN 81 MG: 81 TABLET, COATED ORAL at 06:10

## 2024-10-13 RX ADMIN — INSULIN ASPART 2 UNITS: 100 INJECTION, SOLUTION INTRAVENOUS; SUBCUTANEOUS at 11:10

## 2024-10-13 RX ADMIN — INSULIN ASPART 7 UNITS: 100 INJECTION, SOLUTION INTRAVENOUS; SUBCUTANEOUS at 11:10

## 2024-10-13 RX ADMIN — INSULIN ASPART 7 UNITS: 100 INJECTION, SOLUTION INTRAVENOUS; SUBCUTANEOUS at 08:10

## 2024-10-13 RX ADMIN — MUPIROCIN: 20 OINTMENT TOPICAL at 08:10

## 2024-10-13 RX ADMIN — THERA TABS 1 TABLET: TAB at 08:10

## 2024-10-13 NOTE — PROGRESS NOTES
[facility]                    Pharmacy       Discharge Medication Education    Patient ACCEPTED medication education. Pharmacy has provided education on the name, indication, and possible side effects of the medication(s) prescribed, using teach-back method.     The following medications have also been discussed, during this admission.        Medication List        START taking these medications      clopidogreL 75 mg tablet  Commonly known as: PLAVIX  Take 1 tablet (75 mg total) by mouth once daily.     evolocumab 140 mg/mL Pnij  Commonly known as: REPATHA SURECLICK  Inject 1 mL (140 mg total) into the skin every 14 (fourteen) days.     isosorbide mononitrate 30 MG 24 hr tablet  Commonly known as: IMDUR  Take 1 tablet (30 mg total) by mouth once daily.            CHANGE how you take these medications      BASAGLAR KWIKPEN U-100 INSULIN 100 unit/mL (3 mL) Inpn pen  Generic drug: insulin glargine U-100 (Lantus)  Inject 34 Units into the skin once daily.  What changed: when to take this     metoprolol tartrate 25 MG tablet  Commonly known as: LOPRESSOR  Take 1 tablet (25 mg total) by mouth 2 (two) times daily.  What changed:   when to take this  reasons to take this            CONTINUE taking these medications      aspirin 81 MG EC tablet  Commonly known as: ECOTRIN     CENTRUM SILVER WOMEN ORAL     FREESTYLE ASMITA 3 SENSOR Vicenta  Generic drug: blood-glucose sensor     RESTASIS 0.05 % ophthalmic emulsion  Generic drug: cycloSPORINE            STOP taking these medications      resveratroL 250 mg Cap               Where to Get Your Medications        These medications were sent to [facility]  [address], [place] [address]      Hours: Mon-Fri, 8a-5:30p Phone: [phone]   clopidogreL 75 mg tablet  isosorbide mononitrate 30 MG 24 hr tablet  metoprolol tartrate 25 MG tablet       Information about where to get these medications is not yet available    Ask your nurse or  doctor about these medications  evolocumab 140 mg/mL Pnij          Thank you  Roseann aFir, PharmD  [phone]

## 2024-10-13 NOTE — DISCHARGE SUMMARY
"[facility] Medicine  Discharge Summary      Patient Name: Terra Mcpherson  MRN: [id number]  VERONICA: [id number]  Patient Class: IP- Inpatient  Admission Date: 10/8/2024  Hospital Length of Stay: 4 days  Discharge Date and Time:  10/13/2024 9:18 AM  Attending Physician: Kristan Santana MD   Discharging Provider: Kristan Wright MD  Primary Care Provider: Pola Machado MD    Primary Care Team: Networked reference to record PCT     HPI:   The patient was a 69-year-old female with a past medical history of diabetes, hyperlipidemia, CAD, GERD and cataracts.  The patient was transferred from [place] for cardiac evaluation.  She presented to the hospital due to retrosternal chest pain that had lasted 2 days.  The patient was describes the pain as feeling like "a heavy brick is sitting on my chest".  She states the pain started on Sunday and was between a 3-4/10.  She described it as radiating to her bilateral upper arms, shoulders, armpit, behind her ears and between her shoulder blades.  She stated by today, the pain was 8/10.  She had a similar episode in July and had a CT angiogram done. Results showed: CAD-RADS 2. Diffuse atherosclerosis with long segment in mid LAD of 50-70% stenosis. Coronary calcium score 89.  She was continued on baby aspirin.  At the time she declined medical interventional therapy.  The patient had a left heart catheterization done about 20 years ago which was negative.  Due to her current symptoms she was transferred to [facility] for evaluation and angiogram by cardiology team.    On arrival, the patient was vitals has been stable.  Troponin is within normal limits but lipid panel elevated. EKG showed NSR rate 69, normal EKG, QTc 435 ms.  Patient was to be admitted to [facility] for further evaluation by Cardiology.    Procedure(s) (LRB):  Left heart cath (Left)  INSERTION, STENT, CORONARY ARTERY (Left)  Instantaneous Wave-Free Ratio (IFR) (Left)  Percutaneous " coronary intervention (N/A)  ANGIOGRAM, CORONARY ARTERY (N/A)      Hospital Course:   On 10/09 patient was taken to cardiac cath for LHC, patient rapidly decompensated and became shortness of breath EKG changes from SD to PA arrest 2 PT, patient received epi twice with IV amiodarone Ross was achieved patient intubated in the cath lab transferred to ICU started on Levophed drip with resolution of ST changes after Cardiology discussion with her son mentioned that the patient has multiple allergies unusually prone to have bad allergic reaction is any clear cause of the cardiac arrest initial assessment or LHC:  Assessment:   Mid LAD 50-60% stenosis   Diagonal 3 with 95-99% stenosis angiographically  Cardiac Arrest unknown etiology- case stopped once patient was HD stable  Patient had ROSC with a stent placement, patient will continue on aspirin and Plavix per Cardiology recommendation to follow up with Cardiology.          Goals of Care Treatment Preferences:  Code Status: Full Code      SDOH Screening:  The patient was screened for utility difficulties, food insecurity, transport difficulties, housing insecurity, and interpersonal safety and there were no concerns identified this admission.     Consults:   Consults (From admission, onward)          Status Ordering Provider     Inpatient consult to [facility]  Once        Provider:  (Not yet assigned)    TEE Rodriguez            No new Assessment & Plan notes have been filed under this hospital service since the last note was generated.  Service: Hospital Medicine    Final Active Diagnoses:    Diagnosis Date Noted POA    PRINCIPAL PROBLEM:  Chest pain [R07.9] 10/08/2024 Yes    Cardiac arrest [I46.9] 10/10/2024 No    Coronary artery disease involving native coronary artery of native heart with unstable angina pectoris [I25.110] 10/09/2024 Yes    Type 2 diabetes mellitus with diabetic polyneuropathy, with long-term current use of insulin [E11.42,  Z79.4] 03/17/2023 Not Applicable    Mixed hyperlipidemia [E78.2] 03/17/2023 Yes      Problems Resolved During this Admission:       Discharged Condition: fair    Disposition: Short Term Hospital    Follow Up:   Follow-up Information       Yolanda Escudero MD Follow up on 10/22/2024.    Specialty: Internal Medicine  Why: 10:00am [facility] F/U  Contact information:  [address]  [address]  [place] [address]  [phone]               Jesus Gurrola MD Follow up on 10/23/2024.    Specialties: Cardiology, Interventional Cardiology  Why: 1:40pm [facility] F/U  Contact information:  [address]  [address]  [place] [address]  [phone]                           Patient Instructions:   No discharge procedures on file.    Significant Diagnostic Studies: N/A    Pending Diagnostic Studies:       None           Medications:  Reconciled Home Medications:      Medication List        START taking these medications      clopidogreL 75 mg tablet  Commonly known as: PLAVIX  Take 1 tablet (75 mg total) by mouth once daily.     evolocumab 140 mg/mL Pnij  Commonly known as: REPATHA SURECLICK  Inject 1 mL (140 mg total) into the skin every 14 (fourteen) days.     isosorbide mononitrate 30 MG 24 hr tablet  Commonly known as: IMDUR  Take 1 tablet (30 mg total) by mouth once daily.            CHANGE how you take these medications      BASAGLAR KWIKPEN U-100 INSULIN 100 unit/mL (3 mL) Inpn pen  Generic drug: insulin glargine U-100 (Lantus)  Inject 34 Units into the skin once daily.  What changed: when to take this     metoprolol tartrate 25 MG tablet  Commonly known as: LOPRESSOR  Take 1 tablet (25 mg total) by mouth 2 (two) times daily.  What changed:   when to take this  reasons to take this            CONTINUE taking these medications      aspirin 81 MG EC tablet  Commonly known as: ECOTRIN  Take 81 mg by mouth every morning.     CENTRUM SILVER WOMEN ORAL  Take 1 tablet by mouth Daily.     FREESTYLE ASMITA 3 SENSOR  Vicenta  Generic drug: blood-glucose sensor  by Misc.(Non-Drug; Combo Route) route.     RESTASIS 0.05 % ophthalmic emulsion  Generic drug: cycloSPORINE  Place 1 drop into both eyes 2 (two) times daily.            STOP taking these medications      resveratroL 250 mg Cap              Indwelling Lines/Drains at time of discharge:   Lines/Drains/Airways       None                   Time spent on the discharge of patient: 45 minutes         Kristan Wright MD  Department of Hospital Medicine  [facility]

## 2024-10-13 NOTE — TELEPHONE ENCOUNTER
"Pt left a message with on call nurse - "Reason: New prescription question (Repatha injection not called in) "    Attempted to call pt twice with no answer.  Message was left on voicemail.    Reason for Disposition   Second attempt to contact caller AND no contact made. Phone number verified.    Protocols used: No Contact or Duplicate Contact Call-A-AH    "

## 2024-10-13 NOTE — PROGRESS NOTES
AVS prepared from VN standpoint   10/13/24 0935   Nurse Notification   Charge Nurse Notified? Yes   Name of Charge Nurse Cindy WALKER   Bedside Nurse Notified? Yes   Name of Bedside Nurse Marycarmen DE LOS SANTOS   Nurse Notfication Method Secure Chat   Nurse Notified Of Orders    Notification    Notified? Yes   Name of  Elvia    Notification Method Secure Chat    Notified Of Discharge Status

## 2024-10-13 NOTE — PLAN OF CARE
10/13/24 0947   Final Note   Assessment Type Final Discharge Note   Anticipated Discharge Disposition Home   What phone number can be called within the next 1-3 days to see how you are doing after discharge? [phone]   Post-Acute Status   Discharge Delays None known at this time      spoke with pt regarding D/C plan to home. Pts discharge was put on hold per pts request yesterday. Pt reports feeling better and no needs before leaving. Pts daughter was at bedside and will transport pt home. No additional needs at time of D/C.    Follow up appointments scheduled below.     Future Appointments   Date Time Provider Department Center   10/22/2024 10:00 AM Yolanda Escudero MD [facility] IMPRI [facility]   10/23/2024  1:40 PM Jesus Gurrola MD [facility] CARDIO [place] [facility]   11/27/2024  8:00 AM Chris Moss MD [facility]

## 2024-10-13 NOTE — PROGRESS NOTES
Virtual Nurse:Discharge orders noted; additional clinical references attached.  and pharmacy tech notified.  Patient's discharge instruction packet given by bedside RN.    Cued into patient's room.  Permission received per patient to turn camera to view patient.  Introduced as VN that will be instructing on discharge instructions.  Family member at bedside.  Educated patient and her daughter Lety on reason for admission; medications to hold, continue, and start, appointment to follow-up with doctor, and when to return to ED. Teach back method used. Verbalized understanding  Number given for 24/7 Nurse Line. Opportunity given for questions and questions answered.  Bedside nurse updated. Transport to [facility] requested.       10/13/24 1308   Shift   Virtual Nurse - Patient Verbalized Approval Of Camera Use;VN Rounding   Type of Frequent Check   Type Patient Rounds   Safety/Activity   Patient Rounds visualized patient   Positioning   Body Position sitting up in bed

## 2024-10-13 NOTE — PLAN OF CARE
Problem: Adult Inpatient Plan of Care  Goal: Plan of Care Review  Outcome: Met  Goal: Patient-Specific Goal (Individualized)  Outcome: Met  Goal: Absence of Hospital-Acquired Illness or Injury  Outcome: Met  Goal: Optimal Comfort and Wellbeing  Outcome: Met  Goal: Readiness for Transition of Care  Outcome: Met     Problem: Diabetes Comorbidity  Goal: Blood Glucose Level Within Targeted Range  Outcome: Met     Problem: Chest Pain  Goal: Resolution of Chest Pain Symptoms  Outcome: Met     Problem: Wound  Goal: Optimal Coping  Outcome: Met  Goal: Optimal Functional Ability  Outcome: Met  Goal: Absence of Infection Signs and Symptoms  Outcome: Met  Goal: Improved Oral Intake  Outcome: Met  Goal: Optimal Pain Control and Function  Outcome: Met  Goal: Skin Health and Integrity  Outcome: Met  Goal: Optimal Wound Healing  Outcome: Met     Problem: Infection  Goal: Absence of Infection Signs and Symptoms  Outcome: Met     Problem: Fall Injury Risk  Goal: Absence of Fall and Fall-Related Injury  Outcome: Met

## 2024-10-14 LAB
OHS QRS DURATION: 98 MS
OHS QTC CALCULATION: 453 MS

## 2024-10-15 LAB
OHS QRS DURATION: 96 MS
OHS QRS DURATION: 96 MS
OHS QTC CALCULATION: 446 MS
OHS QTC CALCULATION: 492 MS

## 2024-10-22 ENCOUNTER — OFFICE VISIT (OUTPATIENT)
Dept: PRIMARY CARE CLINIC | Facility: CLINIC | Age: 69
End: 2024-10-22
Payer: MEDICARE

## 2024-10-22 VITALS
BODY MASS INDEX: 26.38 KG/M2 | OXYGEN SATURATION: 95 % | WEIGHT: 158.31 LBS | SYSTOLIC BLOOD PRESSURE: 112 MMHG | HEIGHT: 65 IN | HEART RATE: 58 BPM | DIASTOLIC BLOOD PRESSURE: 72 MMHG

## 2024-10-22 DIAGNOSIS — I46.9 CARDIAC ARREST: ICD-10-CM

## 2024-10-22 DIAGNOSIS — I25.119 CORONARY ARTERY DISEASE INVOLVING NATIVE CORONARY ARTERY OF NATIVE HEART WITH ANGINA PECTORIS: Primary | ICD-10-CM

## 2024-10-22 DIAGNOSIS — Z79.4 TYPE 2 DIABETES MELLITUS WITH DIABETIC POLYNEUROPATHY, WITH LONG-TERM CURRENT USE OF INSULIN: ICD-10-CM

## 2024-10-22 DIAGNOSIS — E78.5 HYPERLIPIDEMIA, UNSPECIFIED HYPERLIPIDEMIA TYPE: ICD-10-CM

## 2024-10-22 DIAGNOSIS — E11.42 TYPE 2 DIABETES MELLITUS WITH DIABETIC POLYNEUROPATHY, WITH LONG-TERM CURRENT USE OF INSULIN: ICD-10-CM

## 2024-10-22 PROBLEM — Z91.89 AT HIGH RISK FOR CORONARY ARTERY DISEASE: Status: RESOLVED | Noted: 2024-06-06 | Resolved: 2024-10-22

## 2024-10-22 PROCEDURE — 99999 PR PBB SHADOW E&M-EST. PATIENT-LVL III: CPT | Mod: PBBFAC,,, | Performed by: INTERNAL MEDICINE

## 2024-10-22 PROCEDURE — 99213 OFFICE O/P EST LOW 20 MIN: CPT | Mod: PBBFAC,PO | Performed by: INTERNAL MEDICINE

## 2024-10-22 RX ORDER — EZETIMIBE 10 MG/1
10 TABLET ORAL DAILY
Qty: 90 TABLET | Refills: 3 | Status: SHIPPED | OUTPATIENT
Start: 2024-10-22 | End: 2025-10-22

## 2024-10-22 RX ORDER — ALIROCUMAB 75 MG/ML
75 INJECTION, SOLUTION SUBCUTANEOUS
Qty: 6 ML | Refills: 3 | Status: ACTIVE | OUTPATIENT
Start: 2024-10-22

## 2024-10-22 NOTE — PROGRESS NOTES
[facility]   New Visit Progress Note   Recent Hospital Discharge     PRESENTING HISTORY     Chief Complaint/Reason for Admission:  Follow up Hospital Discharge   PCP: Pola Machado MD    History of Present Illness:  Ms. Terra Mcpherson is a 69 y.o. female who was recently admitted to the hospital.    [facility] Medicine  Discharge Summary        Patient Name: Terra Mcpherson  MRN: [id number]  VERONICA: [id number]  Patient Class: IP- Inpatient  Admission Date: 10/8/2024  Hospital Length of Stay: 4 days  Discharge Date and Time:  10/13/2024 9:18 AM  Attending Physician: Kristan Santana MD   Discharging Provider: Kristan Wright MD  Primary Care Provider: Pola Machado MD  ___________________________________________________________________    Today:  [facility] for [facility] follow up.  Recently hospitalized for evaluation of chest pain.  Admitted to [facility] Medicine service with Cardiology consultation.  [facility] performed 10/9/24-> Mid LAD 50-60% stenosis, Diagonal 3 with 95-99% stenosis angiographically  Procedure complicated by Cardiac Arrest - unknown etiology.  Patient after taking diagnostic angiogram images and while I was trying to engaged into , patient rapidly decompensated and became SOB, ECG changes from SARAY to PEA arrest to VT. Received epi x 2, IV amio rosc achieved. Intubated in cath lab. Transferred to ICU on levo gtt is stable condition with resolution of ST changes. After long discussion with her son- he did mentioned she has multiple allergies and usually is prone to have bad allergic reaction. Unclear etiology of cardiac arrest.   Patient intubated and transferred to ICU on vasopressor support.   Recovered and extubated.   Returned to cath lab 10/11/24-> Successful PCI of D3 with 2.5 x 12 mm MENG; Negative iFr of mLAD (0.92)- will medically manage.  Medication regimen adjusted.  Patient discharged to home.    Patient accompanied today by  her daughter who is visiting from [place].   Does not have PCP- new to this area and has not yet established care.   Ambulatory and independent with ADL's.  Brought medication bottles for review.  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       Review of Systems  General ROS: negative for chills, fever or weight loss  + fatigue  Psychological ROS: negative for hallucination, depression or suicidal ideation  + anxiety   Ophthalmic ROS: negative for blurry vision, photophobia or eye pain  ENT ROS: negative for epistaxis, sore throat or rhinorrhea  Respiratory ROS: no cough, shortness of breath, or wheezing  Cardiovascular ROS: no chest pain or dyspnea on exertion  Gastrointestinal ROS: no abdominal pain, change in bowel habits, or black/ bloody stools  Genito-Urinary ROS: no dysuria, trouble voiding, or hematuria  Musculoskeletal ROS: negative for gait disturbance or muscular weakness  Neurological ROS: no syncope or seizures; no ataxia  Dermatological ROS: negative for pruritis, rash and jaundice      PAST HISTORY:     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     ST elevation (STEMI) myocardial infarction of unspecified site        Past Surgical History:   Procedure Laterality Date    CORONARY ANGIOGRAPHY N/A 10/11/2024    Procedure: ANGIOGRAM, CORONARY ARTERY;  Surgeon: Jesus Gurrola MD;  Location: [facility] CATH LAB/EP;  Service: Cardiology;  Laterality: N/A;    CORONARY STENT PLACEMENT Left 10/11/2024    Procedure: INSERTION, STENT, CORONARY ARTERY;  Surgeon: Jesus Gurrola MD;  Location: [facility] CATH LAB/EP;  Service: Cardiology;  Laterality: Left;    DILATION AND CURETTAGE OF UTERUS      ESOPHAGOGASTRODUODENOSCOPY      ESOPHAGOGASTRODUODENOSCOPY N/A 11/22/2023    Procedure: EGD (ESOPHAGOGASTRODUODENOSCOPY);  Surgeon: Brigitte Zeng MD;  Location: [facility] ENDO;  Service: Endoscopy;  Laterality:  N/A;    EYE SURGERY Bilateral     Cataract Surgery    HYSTERECTOMY      INSTANTANEOUS WAVE-FREE RATIO (IFR) Left 10/11/2024    Procedure: Instantaneous Wave-Free Ratio (IFR);  Surgeon: Jesus Gurrola MD;  Location: [facility] CATH LAB/EP;  Service: Cardiology;  Laterality: Left;    IVUS, CORONARY  10/9/2024    Procedure: IVUS, Coronary;  Surgeon: Jesus Gurrola MD;  Location: [facility] CATH LAB/EP;  Service: Cardiology;;    LEFT HEART CATHETERIZATION Left 10/9/2024    Procedure: Left heart cath;  Surgeon: Jesus Gurrola MD;  Location: [facility] CATH LAB/EP;  Service: Cardiology;  Laterality: Left;    LEFT HEART CATHETERIZATION Left 10/11/2024    Procedure: Left heart cath;  Surgeon: Jesus Gurrola MD;  Location: [facility] CATH LAB/EP;  Service: Cardiology;  Laterality: Left;    PERCUTANEOUS CORONARY INTERVENTION, ARTERY N/A 10/11/2024    Procedure: Percutaneous coronary intervention;  Surgeon: Jesus Gurrola MD;  Location: [facility] CATH LAB/EP;  Service: Cardiology;  Laterality: N/A;    VAGINAL DELIVERY      x2       Family History   Problem Relation Name Age of Onset    No Known Problems Mother      No Known Problems Father      Colon cancer Other Niece          MEDICATIONS & ALLERGIES:     Current Outpatient Medications on File Prior to Visit   Medication Sig Dispense Refill    aspirin (ECOTRIN) 81 MG EC tablet Take 81 mg by mouth every morning.      RAMIREZAGLTRISTIN GRAHAMPEN U-100 INSULIN 100 unit/mL (3 mL) InPn pen Inject 34 Units into the skin once daily. (Patient taking differently: Inject 34 Units into the skin every evening.) 12 mL 11    blood-glucose sensor (FREESTYLE ASMITA 3 SENSOR) Vicenta by Misc.(Non-Drug; Combo Route) route.      clopidogreL (PLAVIX) 75 mg tablet Take 1 tablet (75 mg total) by mouth once daily. 30 tablet 11    evolocumab (REPATHA SURECLICK) 140 mg/mL PnIj Inject 1 mL (140 mg total) into the skin every 14 (fourteen) days. 2 mL 2    isosorbide mononitrate (IMDUR) 30 MG 24 hr tablet Take 1  "tablet (30 mg total) by mouth once daily. 30 tablet 11    metoprolol tartrate (LOPRESSOR) 25 MG tablet Take 1 tablet (25 mg total) by mouth 2 (two) times daily. 180 tablet 3    multivit-min/iron/FA/vit K/lut (CENTRUM SILVER WOMEN ORAL) Take 1 tablet by mouth Daily.      RESTASIS 0.05 % ophthalmic emulsion Place 1 drop into both eyes 2 (two) times daily.       No current facility-administered medications on file prior to visit.        Review of patient's allergies indicates:   Allergen Reactions    Iodinated contrast media Anaphylaxis    Statins-hmg-coa reductase inhibitors Anaphylaxis and Rash     Swelling / Pancreatitis      Adhesive Itching     To Cl[employer]ara brand HRT patch and generic only.     Fenofibrate      Fingers turn purple     Metformin Other (See Comments)     Myalgia /GI UPSET      Omeprazole Other (See Comments)     Patient claims she experienced hair loss, finger nails falling off, rash, and edema due to medication.     Pamabrom      INTOLERANCE/ SEVERE DEHYDRATION : SIDE EFFECTS    Sulfa (sulfonamide antibiotics) Rash       OBJECTIVE:     Vital Signs:  /72 (BP Location: Left arm, Patient Position: Sitting)   Pulse (!) 58   Ht 5' 5" (1.651 m)   Wt 71.8 kg (158 lb 4.6 oz)   SpO2 95%   BMI 26.34 kg/m²   Wt Readings from Last 3 Encounters:   10/22/24 1029 71.8 kg (158 lb 4.6 oz)   10/11/24 0500 72 kg (158 lb 11.7 oz)   10/10/24 0500 75.4 kg (166 lb 3.6 oz)   10/09/24 1759 74.8 kg (165 lb)   10/08/24 2009 72.6 kg (160 lb 0.9 oz)   10/08/24 1848 72.8 kg (160 lb 7.9 oz)   10/08/24 0144 72.6 kg (160 lb 0.9 oz)   10/07/24 1132 71.7 kg (158 lb)     Body mass index is 26.34 kg/m².        Physical Exam:  /72 (BP Location: Left arm, Patient Position: Sitting)   Pulse (!) 58   Ht 5' 5" (1.651 m)   Wt 71.8 kg (158 lb 4.6 oz)   SpO2 95%   BMI 26.34 kg/m²   General appearance: alert, cooperative, no distress  Constitutional:Oriented to person, place, and time  + appears well-developed and " well-nourished.   HEENT: Normocephalic, atraumatic, neck symmetrical, no nasal discharge   Eyes: conjunctivae/corneas clear, PERRL, EOM's intact  Lungs: clear to auscultation bilaterally, no dullness to percussion bilaterally  Heart: regular rate and rhythm without rub; no displacement of the PMI   Abdomen: soft, non-tender; bowel sounds normoactive; no organomegaly  Extremities: extremities symmetric; no clubbing, cyanosis, or edema  Integument: Skin color, texture, turgor normal; no rashes; hair distrubution normal  Neurologic: Alert and oriented X 3, normal strength, normal coordination and gait  Psychiatric: no pressured speech; normal affect; no evidence of impaired cognition     Laboratory  Lab Results   Component Value Date    WBC 14.01 (H) 10/13/2024    HGB 13.8 10/13/2024    HCT 37.4 10/13/2024    MCV 76 (L) 10/13/2024     10/13/2024     BMP  Lab Results   Component Value Date     10/13/2024    K 3.8 10/13/2024     10/13/2024    CO2 23 10/13/2024    BUN 21 10/13/2024    CREATININE 0.9 10/13/2024    CALCIUM 9.1 10/13/2024    ANIONGAP 10 10/13/2024    EGFRNORACEVR >60 10/13/2024     Lab Results   Component Value Date    ALT 60 (H) 10/10/2024    AST 43 (H) 10/10/2024    ALKPHOS 64 10/10/2024    BILITOT 0.5 10/10/2024     Lab Results   Component Value Date    INR 1.1 10/09/2024    INR 0.9 10/07/2024     Lab Results   Component Value Date    HGBA1C 7.5 (H) 10/08/2024       Diagnostic Results:    2 D echo 10/9/24:    Left Ventricle: Mild global hypokinesis present. There is low normal systolic function with a visually estimated ejection fraction of 50 - 55%.    Right Ventricle: Normal right ventricular cavity size. Wall thickness is normal. Systolic function is normal.    IVC/SVC: Patient is ventilated, cannot use IVC diameter to estimate right atrial pressure.    ASSESSMENT & PLAN:       Coronary artery disease involving native coronary artery of native heart with angina pectoris  - recent  hospitalization as above  - stent placed to D3  - continue Plavix/Aspirin/Metoprolol    Cardiac arrest  - had cardiac arrest during first angiogram, procedure aborted  - required intubation, vasopressor support, ICU admission  - recovered and extubated  - etiology of cardiac arrest unclear     Hyperlipidemia, unspecified hyperlipidemia type  - previous statin intolerance  - start Zetia as recommended by hospital team  - Repatha prescribed but not on insurance formulary  - Praluent on formulary-> sent to [facility]  -     ezetimibe (ZETIA) 10 mg tablet; Take 1 tablet (10 mg total) by mouth once daily.  Dispense: 90 tablet; Refill: 3  -     alirocumab (PRALUENT PEN) 75 mg/mL PnIj; Inject 1 mL (75 mg total) into the skin every 14 (fourteen) days.  Dispense: 6 mL; Refill: 3    Type 2 diabetes mellitus with diabetic polyneuropathy, with long-term current use of insulin  - HgBA1C 7.5  - previous intolerance to Metformin , Farixga, Jardiance   - Now maintained on insulin only  - Wears Freestyle-> reviewed glucose log, numbers in favorable range     Patient will be released from hospital follow up clinic.  She will see Dr Lydia Zaldivar 11/5/24 to establish new primary care.    Instructions for the patient:      Scheduled Follow-up :  Future Appointments   Date Time Provider Department Center   10/23/2024  1:40 PM Jesus Gurrola MD [facility] CARD[facility]   11/5/2024 11:20 AM Lydia Lopez MD [facility]   11/27/2024  8:00 AM Chris Moss MD [facility]       Post Visit Medication List:     Medication List            Accurate as of October 22, 2024 12:36 PM. If you have any questions, ask your nurse or doctor.                START taking these medications      ezetimibe 10 mg tablet  Commonly known as: ZETIA  Take 1 tablet (10 mg total) by mouth once daily.  Started by: Yolanda sEcudero MD     PRALUENT PEN 75 mg/mL Pnij  Generic drug: alirocumab  Inject 1 mL  (75 mg total) into the skin every 14 (fourteen) days.  Started by: Yolanda Escudero MD            CONTINUE taking these medications      aspirin 81 MG EC tablet  Commonly known as: ECOTRIN     BASAGLAR KWIKPEN U-100 INSULIN 100 unit/mL (3 mL) Inpn pen  Generic drug: insulin glargine U-100 (Lantus)  Inject 34 Units into the skin once daily.     CENTRUM SILVER WOMEN ORAL     clopidogreL 75 mg tablet  Commonly known as: PLAVIX  Take 1 tablet (75 mg total) by mouth once daily.     FREESTYLE ASMITA 3 SENSOR Vicenta  Generic drug: blood-glucose sensor     isosorbide mononitrate 30 MG 24 hr tablet  Commonly known as: IMDUR  Take 1 tablet (30 mg total) by mouth once daily.     metoprolol tartrate 25 MG tablet  Commonly known as: LOPRESSOR  Take 1 tablet (25 mg total) by mouth 2 (two) times daily.     RESTASIS 0.05 % ophthalmic emulsion  Generic drug: cycloSPORINE            STOP taking these medications      evolocumab 140 mg/mL Pnij  Commonly known as: REPATHA SURECLICK  Stopped by: Yolanda Escudero MD               Where to Get Your Medications        These medications were sent to [facility], [place] - [address]  [address], [place] [address]      Phone: [phone]   ezetimibe 10 mg tablet       Information about where to get these medications is not yet available    Ask your nurse or doctor about these medications  PRALUENT PEN 75 mg/mL Pnij         Signing Physician:  Yolanda Escudero MD

## 2024-10-23 ENCOUNTER — OFFICE VISIT (OUTPATIENT)
Dept: CARDIOLOGY | Facility: CLINIC | Age: 69
End: 2024-10-23
Payer: MEDICARE

## 2024-10-23 VITALS
HEIGHT: 65 IN | WEIGHT: 158 LBS | BODY MASS INDEX: 26.33 KG/M2 | SYSTOLIC BLOOD PRESSURE: 107 MMHG | DIASTOLIC BLOOD PRESSURE: 73 MMHG | HEART RATE: 63 BPM | OXYGEN SATURATION: 97 %

## 2024-10-23 DIAGNOSIS — T46.6X5A ADVERSE EFFECT OF HMG-COA REDUCTASE INHIBITOR: ICD-10-CM

## 2024-10-23 DIAGNOSIS — I87.2 VENOUS INSUFFICIENCY: ICD-10-CM

## 2024-10-23 DIAGNOSIS — Z91.89 AT HIGH RISK FOR CORONARY ARTERY DISEASE: ICD-10-CM

## 2024-10-23 DIAGNOSIS — I46.9 CARDIAC ARREST: Primary | ICD-10-CM

## 2024-10-23 DIAGNOSIS — Z78.9 STATIN INTOLERANCE: ICD-10-CM

## 2024-10-23 DIAGNOSIS — E78.2 MIXED HYPERLIPIDEMIA: ICD-10-CM

## 2024-10-23 DIAGNOSIS — Z79.4 TYPE 2 DIABETES MELLITUS WITH DIABETIC POLYNEUROPATHY, WITH LONG-TERM CURRENT USE OF INSULIN: ICD-10-CM

## 2024-10-23 DIAGNOSIS — I25.110 CORONARY ARTERY DISEASE INVOLVING NATIVE CORONARY ARTERY OF NATIVE HEART WITH UNSTABLE ANGINA PECTORIS: ICD-10-CM

## 2024-10-23 DIAGNOSIS — R01.1 CARDIAC MURMUR: ICD-10-CM

## 2024-10-23 DIAGNOSIS — E11.42 TYPE 2 DIABETES MELLITUS WITH DIABETIC POLYNEUROPATHY, WITH LONG-TERM CURRENT USE OF INSULIN: ICD-10-CM

## 2024-10-23 PROCEDURE — 99999 PR PBB SHADOW E&M-EST. PATIENT-LVL III: CPT | Mod: PBBFAC,,, | Performed by: STUDENT IN AN ORGANIZED HEALTH CARE EDUCATION/TRAINING PROGRAM

## 2024-10-23 PROCEDURE — 99213 OFFICE O/P EST LOW 20 MIN: CPT | Mod: PBBFAC,PN | Performed by: STUDENT IN AN ORGANIZED HEALTH CARE EDUCATION/TRAINING PROGRAM

## 2024-11-13 ENCOUNTER — TELEPHONE (OUTPATIENT)
Dept: CARDIOLOGY | Facility: CLINIC | Age: 69
End: 2024-11-13
Payer: MEDICARE

## 2024-11-13 DIAGNOSIS — E78.2 MIXED HYPERLIPIDEMIA: Primary | ICD-10-CM

## 2024-11-13 NOTE — TELEPHONE ENCOUNTER
----- Message from Pharmacist Areli sent at 11/13/2024  3:10 PM CST -----  Regarding: Praluent  Hello,    Please send in orders for lipid panel, CMP, and hepatic function panel to be completed at Ms. May's 3 month follow up appointment with you so we may monitor for safety and efficacy. Thank you in advance.    Thanks,   Areli Espinoza, PharmD  Clinical Pharmacist  [facility]   Phone: [phone]  Fax: [phone]

## 2024-11-13 NOTE — TELEPHONE ENCOUNTER
Spoke with patient.  Patient is not having any interactions with her meds.  She has an abscess tooth, is on antibiotics and needs a tooth pulled.  She had a recent stent on 10/11/2024.  She is concerned about stopping her blood thinners for the extraction.  Advised patient that she should not stop her blood thinners for one year post stent placement.  Patient verbalized understanding and will advise the dentist of this information.          ----- Message from [facility] sent at 11/13/2024 11:45 AM CST -----  Type:  Needs Medical Advice/drug interaction concerns    Who Called: pt    Would the patient rather a call back or a response via [facility]? Call   Best Call Back Number: [phone]  Additional Information: pt requesting a call back to discuss drug interactions and concerns

## 2024-11-14 DIAGNOSIS — E78.2 MIXED HYPERLIPIDEMIA: Primary | ICD-10-CM

## 2024-11-27 ENCOUNTER — OFFICE VISIT (OUTPATIENT)
Dept: ENDOCRINOLOGY | Facility: CLINIC | Age: 69
End: 2024-11-27
Payer: MEDICARE

## 2024-11-27 VITALS
RESPIRATION RATE: 17 BRPM | HEIGHT: 65 IN | OXYGEN SATURATION: 99 % | HEART RATE: 89 BPM | DIASTOLIC BLOOD PRESSURE: 60 MMHG | WEIGHT: 161.31 LBS | SYSTOLIC BLOOD PRESSURE: 120 MMHG | BODY MASS INDEX: 26.88 KG/M2

## 2024-11-27 DIAGNOSIS — E11.42 TYPE 2 DIABETES MELLITUS WITH DIABETIC POLYNEUROPATHY, WITH LONG-TERM CURRENT USE OF INSULIN: Primary | ICD-10-CM

## 2024-11-27 DIAGNOSIS — Z79.4 TYPE 2 DIABETES MELLITUS WITH DIABETIC POLYNEUROPATHY, WITH LONG-TERM CURRENT USE OF INSULIN: Primary | ICD-10-CM

## 2024-11-27 DIAGNOSIS — E78.2 MIXED HYPERLIPIDEMIA: ICD-10-CM

## 2024-11-27 DIAGNOSIS — E66.3 OVERWEIGHT WITH BODY MASS INDEX (BMI) OF 26 TO 26.9 IN ADULT: ICD-10-CM

## 2024-11-27 DIAGNOSIS — I25.110 CORONARY ARTERY DISEASE INVOLVING NATIVE CORONARY ARTERY OF NATIVE HEART WITH UNSTABLE ANGINA PECTORIS: ICD-10-CM

## 2024-11-27 PROCEDURE — 99214 OFFICE O/P EST MOD 30 MIN: CPT | Mod: PBBFAC | Performed by: STUDENT IN AN ORGANIZED HEALTH CARE EDUCATION/TRAINING PROGRAM

## 2024-11-27 PROCEDURE — 99999 PR PBB SHADOW E&M-EST. PATIENT-LVL IV: CPT | Mod: PBBFAC,,, | Performed by: STUDENT IN AN ORGANIZED HEALTH CARE EDUCATION/TRAINING PROGRAM

## 2024-11-27 PROCEDURE — 99999 PR STA SHADOW: CPT | Mod: PBBFAC,,, | Performed by: STUDENT IN AN ORGANIZED HEALTH CARE EDUCATION/TRAINING PROGRAM

## 2024-11-27 PROCEDURE — 95251 CONT GLUC MNTR ANALYSIS I&R: CPT | Performed by: STUDENT IN AN ORGANIZED HEALTH CARE EDUCATION/TRAINING PROGRAM

## 2024-11-27 PROCEDURE — 99214 OFFICE O/P EST MOD 30 MIN: CPT | Mod: 25,S$PBB | Performed by: STUDENT IN AN ORGANIZED HEALTH CARE EDUCATION/TRAINING PROGRAM

## 2024-11-27 RX ORDER — DAPAGLIFLOZIN 10 MG/1
10 TABLET, FILM COATED ORAL DAILY
Qty: 30 TABLET | Refills: 11 | Status: SHIPPED | OUTPATIENT
Start: 2024-11-27

## 2024-11-27 RX ORDER — FLUCONAZOLE 150 MG/1
TABLET ORAL
Qty: 2 TABLET | Refills: 0 | Status: SHIPPED | OUTPATIENT
Start: 2024-11-27 | End: 2024-11-29

## 2024-11-27 NOTE — PROGRESS NOTES
"Subjective:      Patient ID: Terra Mcpherson is a 69 y.o. female.    Chief Complaint:  Type 2 diabetes mellitus    History of Present Illness  This is a 69 y.o. female. with a past medical history of Type 2 diabetes mellitus, CAD here for follow up    Interval history:  Diagnosed with CAD, now s/p PCI  Started on Praluent, Plavix    Type 2 diabetes mellitus    Current diabetes medications:  - Basaglar 25 U daily      Past diabetes medications:  - Metformin - intolerance  - Farxiga - intolerance      Lab Results   Component Value Date    CREATININE 0.9 10/13/2024    EGFRNORACEVR >60 10/13/2024       Known diabetic complications: peripheral neuropathy    Weight trend:  Wt Readings from Last 8 Encounters:   11/27/24 73.2 kg (161 lb 4.8 oz)   10/23/24 71.7 kg (158 lb)   10/22/24 71.8 kg (158 lb 4.6 oz)   10/11/24 72 kg (158 lb 11.7 oz)   10/08/24 72.8 kg (160 lb 7.9 oz)   07/09/24 72 kg (158 lb 11.7 oz)   06/06/24 72.6 kg (160 lb 0.9 oz)   05/10/24 72.1 kg (158 lb 14.4 oz)       Family history of diabetes:  Yes, mother and everyone in her side of the family  Siblings +  Children have been checked, negative for diabetes    Prior visit with diabetes education: yes    Current diet: 3 meals per day  Current exercise: On her feet all day    Blood glucose monitoring at home:         Diabetes Management Status  Statin: Not taking  ACE/ARB: Not taking    Screening or Prevention Patient's value   HgA1C Testing and Control   Lab Results   Component Value Date    HGBA1C 7.5 (H) 10/08/2024        LDL control Lab Results   Component Value Date    LDLCALC 195.8 (H) 10/08/2024      Nephropathy screening Lab Results   Component Value Date    MICALBCREAT 20.1 05/10/2024        Last eye exam: Most Recent Eye Exam Date: Not Found      Review of Systems  As above    Social and family history reviewed  Current medications and allergies reviewed    Objective:   /60   Pulse 89   Resp 17   Ht 5' 5" (1.651 m)   Wt 73.2 kg (161 lb " 4.8 oz)   SpO2 99%   BMI 26.84 kg/m²   Physical Exam  Alert, oriented    BP Readings from Last 1 Encounters:   11/27/24 120/60      Wt Readings from Last 1 Encounters:   11/27/24 0827 73.2 kg (161 lb 4.8 oz)     Body mass index is 26.84 kg/m².    Lab Review:   Lab Results   Component Value Date    HGBA1C 7.5 (H) 10/08/2024     Lab Results   Component Value Date    CHOL 281 (H) 10/08/2024    HDL 40 10/08/2024    LDLCALC 195.8 (H) 10/08/2024    TRIG 226 (H) 10/08/2024    CHOLHDL 14.2 (L) 10/08/2024     Lab Results   Component Value Date     10/13/2024    K 3.8 10/13/2024     10/13/2024    CO2 23 10/13/2024     (H) 10/13/2024    BUN 21 10/13/2024    CREATININE 0.9 10/13/2024    CALCIUM 9.1 10/13/2024    PROT 7.1 10/10/2024    ALBUMIN 4.0 10/10/2024    BILITOT 0.5 10/10/2024    ALKPHOS 64 10/10/2024    AST 43 (H) 10/10/2024    ALT 60 (H) 10/10/2024    ANIONGAP 10 10/13/2024    TSH 1.641 05/10/2024       All pertinent labs reviewed    Assessment and Plan     Type 2 diabetes mellitus with diabetic polyneuropathy, with long-term current use of insulin  Glucose controlled with TIR > 70% and average glucose 150s on CGM. Hyperglycemia variable depending on intake. No hypoglycemia.    A1c is 7.5% which is closer to goal.    I strongly suggested starting either an SGLT-2 inhibitor or GLP-1 RA with CV benefit given recent diagnosis of CAD. She was agreeable with re-trying Farxiga. Pending response, will lower insulin and eventually consider adding GLP-1 RA with goal of stopping insulin altogether.    Plan  - Start Farxiga 10 mg daily  - Continue Basaglar 25 U daily  - Continue FreeStyle Augustin 3 CGM - review in 2 weeks    F/u 6 months    Overweight with body mass index (BMI) of 26 to 26.9 in adult  Consider GLP-1 RA with CV benefit later on    Coronary artery disease involving native coronary artery of native heart with unstable angina pectoris  Start SGLT-2 inhibitor with CV benefit  Following with  Cardiology    Mixed hyperlipidemia  Starting on PCSK-9i, monitored by Cardiology          Chris Moss MD  Endocrinology

## 2024-11-27 NOTE — ASSESSMENT & PLAN NOTE
Glucose controlled with TIR > 70% and average glucose 150s on CGM. Hyperglycemia variable depending on intake. No hypoglycemia.    A1c is 7.5% which is closer to goal.    I strongly suggested starting either an SGLT-2 inhibitor or GLP-1 RA with CV benefit given recent diagnosis of CAD. She was agreeable with re-trying Farxiga. Pending response, will lower insulin and eventually consider adding GLP-1 RA with goal of stopping insulin altogether.    Plan  - Start Farxiga 10 mg daily  - Continue Basaglar 25 U daily  - Continue FreeStyle Augustin 3 CGM - review in 2 weeks    F/u 6 months

## 2024-11-29 ENCOUNTER — OFFICE VISIT (OUTPATIENT)
Dept: FAMILY MEDICINE | Facility: CLINIC | Age: 69
End: 2024-11-29
Payer: MEDICARE

## 2024-11-29 VITALS
WEIGHT: 160.06 LBS | HEART RATE: 58 BPM | HEIGHT: 65 IN | DIASTOLIC BLOOD PRESSURE: 70 MMHG | BODY MASS INDEX: 26.67 KG/M2 | OXYGEN SATURATION: 96 % | SYSTOLIC BLOOD PRESSURE: 124 MMHG

## 2024-11-29 DIAGNOSIS — Z12.31 ENCOUNTER FOR SCREENING MAMMOGRAM FOR MALIGNANT NEOPLASM OF BREAST: ICD-10-CM

## 2024-11-29 DIAGNOSIS — T46.6X5A STATIN MYOPATHY: ICD-10-CM

## 2024-11-29 DIAGNOSIS — E66.3 OVERWEIGHT WITH BODY MASS INDEX (BMI) OF 26 TO 26.9 IN ADULT: ICD-10-CM

## 2024-11-29 DIAGNOSIS — E78.2 MIXED HYPERLIPIDEMIA: ICD-10-CM

## 2024-11-29 DIAGNOSIS — Z12.11 SCREENING FOR COLON CANCER: ICD-10-CM

## 2024-11-29 DIAGNOSIS — G72.0 STATIN MYOPATHY: ICD-10-CM

## 2024-11-29 DIAGNOSIS — E11.42 TYPE 2 DIABETES MELLITUS WITH DIABETIC POLYNEUROPATHY, WITH LONG-TERM CURRENT USE OF INSULIN: ICD-10-CM

## 2024-11-29 DIAGNOSIS — Z23 NEED FOR VACCINATION: Primary | ICD-10-CM

## 2024-11-29 DIAGNOSIS — I25.119 CORONARY ARTERY DISEASE INVOLVING NATIVE CORONARY ARTERY OF NATIVE HEART WITH ANGINA PECTORIS: ICD-10-CM

## 2024-11-29 DIAGNOSIS — F32.1 MAJOR DEPRESSIVE DISORDER, SINGLE EPISODE, MODERATE: ICD-10-CM

## 2024-11-29 DIAGNOSIS — Z79.4 TYPE 2 DIABETES MELLITUS WITH DIABETIC POLYNEUROPATHY, WITH LONG-TERM CURRENT USE OF INSULIN: ICD-10-CM

## 2024-11-29 DIAGNOSIS — Z78.0 POST-MENOPAUSE: ICD-10-CM

## 2024-11-29 PROCEDURE — 99215 OFFICE O/P EST HI 40 MIN: CPT | Mod: PBBFAC,PN | Performed by: STUDENT IN AN ORGANIZED HEALTH CARE EDUCATION/TRAINING PROGRAM

## 2024-11-29 PROCEDURE — 99999 PR PBB SHADOW E&M-EST. PATIENT-LVL V: CPT | Mod: PBBFAC,,, | Performed by: STUDENT IN AN ORGANIZED HEALTH CARE EDUCATION/TRAINING PROGRAM

## 2024-11-29 RX ORDER — CYCLOSPORINE 0.5 MG/ML
1 EMULSION OPHTHALMIC NIGHTLY
Qty: 60 EACH | Refills: 3 | Status: SHIPPED | OUTPATIENT
Start: 2024-11-29

## 2024-11-29 NOTE — ASSESSMENT & PLAN NOTE
Lifestyle Modifications: Emphasize the importance of a heart-healthy diet rich in fruits, vegetables, whole grains, and lean proteins. Encourage regular physical activity, aiming for at least 150 minutes of moderate-intensity exercise per week. Advise smoking cessation if applicable.  Blood Pressure Control: Target blood pressure below 130/80 mmHg,  Lipid Management: Aim for low-density lipoprotein (LDL) cholesterol levels   Glucose Control: Optimize glycemic control in patients with diabetes mellitus through lifestyle modifications, oral antidiabetic agents, and/or insulin therapy as appropriate.

## 2024-11-29 NOTE — ASSESSMENT & PLAN NOTE
Managed with Repcipriano and Zetia  Statin intolerance  Plan: .  Lifestyle Modifications: Counseled patient on adopting a heart-healthy lifestyle, including dietary changes (eg Mediterranean diet, low saturated fat and cholesterol), regular physical activity, smoking cessation, and weight management.  Monitoring: Scheduled regular follow-up visits for lipid profile monitoring and medication adjustments as needed. Emphasized importance of adherence to medication therapy and lifestyle modifications.  Risk factor management: Addressed and managed other cardiovascular risk factors, including hypertension, diabetes mellitus, and lifestyle habits, to reduce overall cardiovascular risk.  Patient Education: Provided patient with education on the significance of hyperlipidemia in cardiovascular health, medication regimen, potential side effects, and adherence to lifestyle modification.

## 2024-11-29 NOTE — ASSESSMENT & PLAN NOTE
HbA1c -> 7.5  Follows with endocrinologist  Currently on Insulin and Farxiga  Diabetes plan:   Lifestyle Modifications: Counseled patient on the importance of regular physical activity, healthy eating habits, weight management, and smoking cessation to achieve glycemic control and reduce cardiovascular risk.  Medication therapy: Managed by endocrinology  Monitoring:Instructed patient on self-monitoring blood glucose levels and regular follow up for hemoglobin A1c testing and diabetes management review.  Complications Screening: Recommended regular screening for diabetes-related complications including retinopathy, nephropathy, and cardiovascular disease.

## 2024-11-29 NOTE — PROGRESS NOTES
Patient ID: Terra Mcpherson is a 69 y.o. female.    Chief Complaint: Establish Care    History of Present Illness    CHIEF COMPLAINT:  Terra presents today for follow-up after a recent cardiac event.    CARDIOVASCULAR:  She reports a cardiac arrest in October during a stent procedure. A second procedure under anesthesia successfully placed the stent. She is currently taking Plavix, which she understands must be continued for a year without skipping doses, as well as aspirin and Lopressor. She experiences some angina since the event but is unable to tolerate isosorbide due to severe side effects including dizziness, nausea, headache, and difficulty walking.    DIABETES:  Her recent endocrinologist appointment showed an A1C of 7.5, close to goal. She has started Farciga and aims to discontinue insulin. She uses a glucose monitor and follows a keto diet.    MEDICATION SIDE EFFECTS:  She reports general discomfort with medications, describing flu-like symptoms including aches and pains. She specifically mentions muscle pain as a side effect from statins. She often does not know she will have a bad reaction to a medication until after taking it.    MENTAL HEALTH:  She has a history of depression and anxiety. Following the cardiac event, she expresses concerns about developing PTSD-like symptoms and depression. She experiences anxiety attacks about returning to work.    DENTAL:  She reports a dental abscess requiring major dental work, including tooth extraction. She expresses concern about potential bleeding during dental procedures due to Plavix. She denies current pain from the abscess.    OPHTHALMOLOGY:  She had cataract surgery 3-4 years ago and previously used Restasis for dry eyes. She acknowledges the need for a yearly diabetic eye check but has not established care with an eye doctor since moving to the area. She requests a Restasis refill.    PREVENTIVE CARE:  She is due for a mammogram and colonoscopy. She  reports a significant family history of colon cancer, including a brother with malignant polyps, a grandson with colon cancer in his 20s, and a niece who has had colon cancer. She was previously advised to have colonoscopies every five to seven years. She also needs a bone density scan.    SOCIAL HISTORY:  She has not worked since her cardiac event and expresses anxiety about returning. She is considering reducing her work hours but relies on Social Security income and needs to work to pay rent. She is contemplating discussing options with her employer, such as cutting back hours or exploring FMLA to accommodate her health needs.      ROS:  General: -fever, -chills, -fatigue, -weight gain, -weight loss  Eyes: -vision changes, -redness, -discharge  ENT: -ear pain, -nasal congestion, -sore throat  Cardiovascular: -chest pain, -palpitations, -lower extremity edema  Respiratory: -cough, -shortness of breath  Gastrointestinal: -abdominal pain, -nausea, -vomiting, -diarrhea, -constipation, -blood in stool  Genitourinary: -dysuria, -hematuria, -frequency  Musculoskeletal: -joint pain, +muscle pain  Skin: -rash, -lesion  Neurological: +headache, +dizziness, -numbness, -tingling  Psychiatric: +anxiety, +depression, -sleep difficulty         Physical Exam    General: No acute distress. Well-developed. Well-nourished.  Eyes: EOMI. Sclerae anicteric.  HENT: Normocephalic. Atraumatic. Nares patent. Moist oral mucosa.  Ears: Bilateral TMs clear. Bilateral EACs clear.  Cardiovascular: Regular rate. Regular rhythm. No murmurs. No rubs. No gallops. Normal S1, S2.  Respiratory: Normal respiratory effort. Clear to auscultation bilaterally. No rales. No rhonchi. No wheezing.  Abdomen: Soft. Non-tender. Non-distended. Normoactive bowel sounds.  Musculoskeletal: No  obvious deformity.  Extremities: No lower extremity edema.  Neurological: Alert & oriented x3. No slurred speech. Normal gait.  Psychiatric: Normal mood. Normal affect. Good  insight. Good judgment.  Skin: Warm. Dry. No rash.         Assessment & Plan    CORONARY ARTERY DISEASE AND ANGINA MANAGEMENT:  - Assessed recent cardiac event and ongoing management, including stent placement and anticoagulation therapy.  - Addressed inability to tolerate isosorbide, noting need for alternative angina management.  - Explained importance of continuing Plavix for stent patency, emphasizing risk of stent stenosis if medication is skipped.  - Informed about increased risk of complications from flu and pneumonia in patients with heart disease.  - Continued Plavix, aspirin, Lopressor, Repatha injections, and Farxiga as prescribed.  - Flu vaccine administered in office.  - Follow up with cardiologist Dr. Chen in December as scheduled.    TYPE 2 DIABETES MANAGEMENT:  - Evaluated diabetes control, noting improved A1C (7.5) likely due to keto diet adherence.  - Considered transitioning from insulin to GLP-1 agonist in coordination with endocrinologist Dr. Moss.  - Advised on delayed reading of continuous glucose monitors compared to finger stick tests for accurate blood sugar measurement.  - Terra to continue keto diet for blood sugar management.  - Terra to monitor for symptoms of low blood sugar and use finger stick test if symptomatic.  - Referred to optometry for annual diabetic eye exam.    ANXIETY AND DEPRESSION:  - Recognized post-cardiac event depression and anxiety, offering behavioral health referral as an alternative to pharmacological intervention.  - Discussed common post-cardiac event psychological effects, including PTSD-like symptoms and depression.  - Referred to behavioral health for counseling therapy.    EYE CARE:  - Refilled Restasis for dry eyes.    FAMILY HISTORY OF COLON CANCER:  - Reviewed family history of colon cancer, determining need for diagnostic colonoscopy despite anticoagulation therapy.  - Diagnostic colonoscopy ordered.    DIETARY AND EXERCISE COUNSELING:  -  Recommend engaging in resistance training or using resistance bands to maintain muscle mass.    GENERAL FOLLOW-UP:  - Bone density scan ordered.  - Contact office if any questions or concerns arise.       1. Need for vaccination  -     influenza (adjuvanted) (Fluad) 45 mcg/0.5 mL IM vaccine (> or = 64 yo) 0.5 mL    2. Statin myopathy    3. Major depressive disorder, single episode, moderate  Assessment & Plan:  Behavioral therapy consult   No SI/HI    Orders:  -     Ambulatory referral/consult to Behavioral Health; Future; Expected date: 12/06/2024    4. Type 2 diabetes mellitus with diabetic polyneuropathy, with long-term current use of insulin  Assessment & Plan:  HbA1c -> 7.5  Follows with endocrinologist  Currently on Insulin and Farxiga  Diabetes plan:   Lifestyle Modifications: Counseled patient on the importance of regular physical activity, healthy eating habits, weight management, and smoking cessation to achieve glycemic control and reduce cardiovascular risk.  Medication therapy: Managed by endocrinology  Monitoring:Instructed patient on self-monitoring blood glucose levels and regular follow up for hemoglobin A1c testing and diabetes management review.  Complications Screening: Recommended regular screening for diabetes-related complications including retinopathy, nephropathy, and cardiovascular disease.      Orders:  -     RESTASIS 0.05 % ophthalmic emulsion; Place 1 drop into both eyes every evening.  Dispense: 60 each; Refill: 3  -     Ambulatory referral/consult to Optometry; Future; Expected date: 12/06/2024    5. Encounter for screening mammogram for malignant neoplasm of breast  -     Mammo Digital Screening Bilat w/ Phi; Future; Expected date: 11/29/2024    6. Screening for colon cancer  -     Ambulatory referral/consult to Endo Procedure ; Future; Expected date: 11/30/2024    7. Post-menopause  -     DXA Bone Density Axial Skeleton 1 or more sites; Future; Expected date:  11/29/2024    8. Mixed hyperlipidemia  Assessment & Plan:  Managed with Repatha and Zetia  Statin intolerance  Plan: .  Lifestyle Modifications: Counseled patient on adopting a heart-healthy lifestyle, including dietary changes (eg Mediterranean diet, low saturated fat and cholesterol), regular physical activity, smoking cessation, and weight management.  Monitoring: Scheduled regular follow-up visits for lipid profile monitoring and medication adjustments as needed. Emphasized importance of adherence to medication therapy and lifestyle modifications.  Risk factor management: Addressed and managed other cardiovascular risk factors, including hypertension, diabetes mellitus, and lifestyle habits, to reduce overall cardiovascular risk.  Patient Education: Provided patient with education on the significance of hyperlipidemia in cardiovascular health, medication regimen, potential side effects, and adherence to lifestyle modification.         9. Coronary artery disease involving native coronary artery of native heart with angina pectoris  Assessment & Plan:    Lifestyle Modifications: Emphasize the importance of a heart-healthy diet rich in fruits, vegetables, whole grains, and lean proteins. Encourage regular physical activity, aiming for at least 150 minutes of moderate-intensity exercise per week. Advise smoking cessation if applicable.  Blood Pressure Control: Target blood pressure below 130/80 mmHg,  Lipid Management: Aim for low-density lipoprotein (LDL) cholesterol levels   Glucose Control: Optimize glycemic control in patients with diabetes mellitus through lifestyle modifications, oral antidiabetic agents, and/or insulin therapy as appropriate.       10. Overweight with body mass index (BMI) of 26 to 26.9 in adult  Assessment & Plan:  Per endocrinology - under consideration for GLP-1                No follow-ups on file.    This note was generated with the assistance of ambient listening technology. Verbal  consent was obtained by the patient and accompanying visitor(s) for the recording of patient appointment to facilitate this note. I attest to having reviewed and edited the generated note for accuracy, though some syntax or spelling errors may persist. Please contact the author of this note for any clarification.

## 2024-12-04 ENCOUNTER — TELEPHONE (OUTPATIENT)
Dept: ENDOCRINOLOGY | Facility: CLINIC | Age: 69
End: 2024-12-04
Payer: MEDICARE

## 2024-12-04 DIAGNOSIS — E11.9 TYPE 2 DIABETES MELLITUS WITHOUT COMPLICATION, UNSPECIFIED WHETHER LONG TERM INSULIN USE: ICD-10-CM

## 2024-12-04 NOTE — TELEPHONE ENCOUNTER
----- Message from Jaqueline Teixeira PA-C sent at 12/4/2024  9:27 AM CST -----  Regarding: FW: Review Shanice, now on Farxiga  Please tell patient Dr. Moss reviewed her Shanice. He wants her to decrease insulin to 20 units once daily and continue Farxiga. He will review her numbers again in 2 weeks  ----- Message -----  From: Chris Moss MD  Sent: 12/4/2024  12:00 AM CST  To: Chris Moss MD  Subject: Review Shanice, now on Farxiga

## 2024-12-05 ENCOUNTER — TELEPHONE (OUTPATIENT)
Dept: ENDOSCOPY | Facility: HOSPITAL | Age: 69
End: 2024-12-05

## 2024-12-05 ENCOUNTER — CLINICAL SUPPORT (OUTPATIENT)
Dept: ENDOSCOPY | Facility: HOSPITAL | Age: 69
End: 2024-12-05
Attending: STUDENT IN AN ORGANIZED HEALTH CARE EDUCATION/TRAINING PROGRAM
Payer: MEDICARE

## 2024-12-05 VITALS — HEIGHT: 65 IN | BODY MASS INDEX: 26.82 KG/M2 | WEIGHT: 161 LBS

## 2024-12-05 DIAGNOSIS — Z12.11 SCREENING FOR COLON CANCER: ICD-10-CM

## 2024-12-05 RX ORDER — SODIUM, POTASSIUM,MAG SULFATES 17.5-3.13G
1 SOLUTION, RECONSTITUTED, ORAL ORAL DAILY
Qty: 1 KIT | Refills: 0 | Status: SHIPPED | OUTPATIENT
Start: 2024-12-05 | End: 2024-12-07

## 2024-12-05 NOTE — TELEPHONE ENCOUNTER
Referral for procedure from PAT appointment      Spoke to patient to schedule procedure(s) Colonoscopy       Physician to perform procedure(s) Dr. VALENTINA Ordonez  Date of Procedure (s) 1/21/25  Arrival Time 9:30 AM  Time of Procedure(s) 10:30 AM   Location of Procedure(s) [facility] 1st Floor   Type of Rx Prep sent to patient: Suprep  Instructions provided to patient via [facility]    Patient was informed on the following information and verbalized understanding. Screening questionnaire reviewed with patient and complete. If procedure requires anesthesia, a responsible adult needs to be present to accompany the patient home, patient cannot drive after receiving anesthesia. Appointment details are tentative, especially check-in time. Patient will receive a prep-op call 7 days prior to confirm check-in time for procedure. If applicable the patient should contact their pharmacy to verify Rx for procedure prep is ready for pick-up. Patient was advised to call the scheduling department at [phone] if pharmacy states no Rx is available. Patient was advised to call the endoscopy scheduling department if any questions or concerns arise.      [facility] Scheduling Department    The patient is currently under an internal cardiologist, tasneem Qureshi. Is patient okay to hold blood thinner Plavix (clopidogrel) for their upcoming scheduled Colonoscopy on 1/21/25.       Notes: Patient had cardiac cath on 10/11/24. Pt aware she may not get approval to hold Plavix.

## 2024-12-12 ENCOUNTER — TELEPHONE (OUTPATIENT)
Dept: ENDOSCOPY | Facility: HOSPITAL | Age: 69
End: 2024-12-12
Payer: MEDICARE

## 2024-12-12 NOTE — TELEPHONE ENCOUNTER
----- Message from ASHLEY Caba sent at 2024  9:35 AM CST -----  Regardin/21 BT  The patient is currently under an internal cardiologist, tasneem Qureshi. Is patient okay to hold blood thinner Plavix (clopidogrel) for their upcoming scheduled Colonoscopy on 25.       Notes: Patient had cardiac cath on 10/11/24. Pt aware she may not get approval to hold Plavix.

## 2024-12-12 NOTE — TELEPHONE ENCOUNTER
Dear Dr Belcher,    Patient has a scheduled procedure Colonoscopy on 1/21/25 and is currently taking a blood thinner. In order to ensure patient safety, we would like to confirm that the patient can place their blood thinner medication on hold for the procedure. Can he/she discontinue Plavix (clopidogrel) for a minimum of 5 days prior to the procedure?     Thank you for your prompt reply.    [facility] Endoscopy Scheduling

## 2024-12-16 ENCOUNTER — TELEPHONE (OUTPATIENT)
Dept: ENDOSCOPY | Facility: HOSPITAL | Age: 69
End: 2024-12-16
Payer: MEDICARE

## 2024-12-16 NOTE — TELEPHONE ENCOUNTER
Dr Lopez-     You placed an order for a screening colonoscopy.  She is scheduled on 1/21/25 and is on Plavix.  See message below from cardiology regarding holding medication for procedure and advise on how to proceed.  Thank you.     Devi

## 2024-12-18 ENCOUNTER — TELEPHONE (OUTPATIENT)
Dept: ENDOCRINOLOGY | Facility: CLINIC | Age: 69
End: 2024-12-18
Payer: MEDICARE

## 2024-12-18 NOTE — TELEPHONE ENCOUNTER
----- Message from Jaqueline Teixeira PA-C sent at 12/18/2024 10:30 AM CST -----  Regarding: FW: Augustin; lantus 20; farxiga  Please inform patient that we reviewed Augustin and we want her to decrease Lantus to 16 units once daily. We will review numbers in 2 weeks  ----- Message -----  From: Jaqueline Teixeira PA-C  Sent: 12/18/2024  12:00 AM CST  To: Jaqueline Teixeira PA-C  Subject: Augustin; lantus 20; farxiga

## 2024-12-20 DIAGNOSIS — Z12.11 SCREENING FOR COLON CANCER: Primary | ICD-10-CM

## 2024-12-20 NOTE — TELEPHONE ENCOUNTER
Contacted patient regarding the recommendation of providers.  Informed her that the colonoscopy will have to be rescheduled until after 10/2025 because she cannot hold Plavix until then.  Stated understanding.  Main line phone number provided to call back to reschedule.  Future follow up PAT appointment made.

## 2024-12-31 ENCOUNTER — TELEPHONE (OUTPATIENT)
Dept: ENDOCRINOLOGY | Facility: CLINIC | Age: 69
End: 2024-12-31
Payer: MEDICARE

## 2024-12-31 NOTE — TELEPHONE ENCOUNTER
----- Message from Jaqueline Teixeira PA-C sent at 12/31/2024  8:37 AM CST -----  Regarding: FW: Augustin; Lantus 16, farxiga  Augustin reviewed. Numbers look much better. Continue Lantus 16 units and farxiga  ----- Message -----  From: Jaqueline Teixeira PA-C  Sent: 12/31/2024  12:00 AM CST  To: Jaqueline Teixeira PA-C  Subject: Augustin; Juant 16, ovidioxiga

## 2025-01-07 ENCOUNTER — PATIENT MESSAGE (OUTPATIENT)
Dept: FAMILY MEDICINE | Facility: CLINIC | Age: 70
End: 2025-01-07
Payer: MEDICARE

## 2025-01-10 ENCOUNTER — OFFICE VISIT (OUTPATIENT)
Dept: ALLERGY | Facility: CLINIC | Age: 70
End: 2025-01-10
Payer: MEDICARE

## 2025-01-10 ENCOUNTER — TELEPHONE (OUTPATIENT)
Dept: ENDOCRINOLOGY | Facility: CLINIC | Age: 70
End: 2025-01-10
Payer: MEDICARE

## 2025-01-10 VITALS — BODY MASS INDEX: 27.22 KG/M2 | HEIGHT: 65 IN | WEIGHT: 163.38 LBS

## 2025-01-10 DIAGNOSIS — I25.119 CORONARY ARTERY DISEASE INVOLVING NATIVE CORONARY ARTERY OF NATIVE HEART WITH ANGINA PECTORIS: ICD-10-CM

## 2025-01-10 DIAGNOSIS — I46.9 CARDIAC ARREST: Primary | ICD-10-CM

## 2025-01-10 DIAGNOSIS — L30.9 DERMATITIS: ICD-10-CM

## 2025-01-10 PROCEDURE — 99999 PR PBB SHADOW E&M-EST. PATIENT-LVL III: CPT | Mod: PBBFAC,,, | Performed by: ALLERGY & IMMUNOLOGY

## 2025-01-10 PROCEDURE — 99213 OFFICE O/P EST LOW 20 MIN: CPT | Mod: PBBFAC,PO | Performed by: ALLERGY & IMMUNOLOGY

## 2025-01-10 RX ORDER — TRIAMCINOLONE ACETONIDE 1 MG/G
CREAM TOPICAL 2 TIMES DAILY
Qty: 80 G | Refills: 0 | Status: SHIPPED | OUTPATIENT
Start: 2025-01-10

## 2025-01-10 NOTE — PROGRESS NOTES
Subjective:       Patient ID: Terra Mcpherson is a 69 y.o. female.    Chief Complaint:  Allergies      70 yo woman presents fro consult from cardiology for possible contrast media reaction. She states she had chest pain and was admitted and had angiogram. During angiogram they were about to place a stent and she went into cardia arrest. She was given epi x 2 and amiodarone and intubated and taken to ICU. There is no report of urticaria and angioedema. She self extubated in about an hour and was ok after. Next day had repeat ultrasound guided craterization and stent was placed. They were unsure if reaction was allergic reaction to contrast or not. No issues since then. She had angiogram many years before and no issues. Never had issue with contrast before. Not had any since. She has no food, insect or latex allergy. She has sensitive skin, has rash from where the cardiac leads were and it is not clearing, getting worse.        Environmental History: see history section for home environment  Review of Systems     Objective:      Physical Exam  Vitals and nursing note reviewed.   Constitutional:       General: She is not in acute distress.     Appearance: Normal appearance. She is not ill-appearing.   HENT:      Nose: No rhinorrhea.   Eyes:      General:         Right eye: No discharge.         Left eye: No discharge.      Conjunctiva/sclera: Conjunctivae normal.   Pulmonary:      Effort: Pulmonary effort is normal. No respiratory distress.   Abdominal:      General: There is no distension.   Skin:     General: Skin is warm and dry.      Findings: Erythema and rash (erythe,atous raised patch covering chest, c.w cotnact derm) present.   Neurological:      Mental Status: She is alert and oriented to person, place, and time.   Psychiatric:         Mood and Affect: Mood normal.         Behavior: Behavior normal.         Laboratory:   none performed   Assessment:       1. Cardiac arrest    2. Coronary artery disease  involving native coronary artery of native heart with angina pectoris    3. Dermatitis         Plan:       Dermatitis- advised likely contact derm from adhesive. Will treat with TAC BID for a week, if to better needs derm eval  Cardiac arrest- advised pt without urticaria or angioedema was less likely allergic reaction to contrast media and more likely from catheterization. Unfortunately there is no testing for contrast media to be sure if it was a trigger. Can consider pretreats for further contrast with prednisone 50 mg 13 hours, 7 hours and 1 hour prior and benadryl 50 mg 7 and 1 hour prior    I spent a total of 45 minutes on the day of the visit.  This includes face to face time and non-face to face time preparing to see the patient (eg, review of tests), obtaining and/or reviewing separately obtained history, documenting clinical information in the electronic or other health record, independently interpreting results and communicating results to the patient/family/caregiver, or care coordinator.

## 2025-01-10 NOTE — TELEPHONE ENCOUNTER
[facility].  Confirmation received.  Sent to scan.

## 2025-01-22 ENCOUNTER — PATIENT MESSAGE (OUTPATIENT)
Dept: RESPIRATORY THERAPY | Facility: HOSPITAL | Age: 70
End: 2025-01-22
Payer: MEDICARE

## 2025-02-05 ENCOUNTER — PATIENT OUTREACH (OUTPATIENT)
Dept: ADMINISTRATIVE | Facility: HOSPITAL | Age: 70
End: 2025-02-05
Payer: MEDICARE

## 2025-02-05 ENCOUNTER — OFFICE VISIT (OUTPATIENT)
Dept: CARDIOLOGY | Facility: CLINIC | Age: 70
End: 2025-02-05
Payer: MEDICARE

## 2025-02-05 VITALS
HEART RATE: 66 BPM | SYSTOLIC BLOOD PRESSURE: 117 MMHG | OXYGEN SATURATION: 95 % | WEIGHT: 159.5 LBS | BODY MASS INDEX: 26.57 KG/M2 | DIASTOLIC BLOOD PRESSURE: 64 MMHG | HEIGHT: 65 IN

## 2025-02-05 DIAGNOSIS — Z78.9 STATIN INTOLERANCE: ICD-10-CM

## 2025-02-05 DIAGNOSIS — E11.42 TYPE 2 DIABETES MELLITUS WITH DIABETIC POLYNEUROPATHY, WITH LONG-TERM CURRENT USE OF INSULIN: ICD-10-CM

## 2025-02-05 DIAGNOSIS — E78.2 MIXED HYPERLIPIDEMIA: ICD-10-CM

## 2025-02-05 DIAGNOSIS — I70.0 AORTIC ATHEROSCLEROSIS: ICD-10-CM

## 2025-02-05 DIAGNOSIS — I25.110 CORONARY ARTERY DISEASE INVOLVING NATIVE CORONARY ARTERY OF NATIVE HEART WITH UNSTABLE ANGINA PECTORIS: Primary | ICD-10-CM

## 2025-02-05 DIAGNOSIS — Z91.041 CONTRAST MEDIA ALLERGY: ICD-10-CM

## 2025-02-05 DIAGNOSIS — Z79.4 TYPE 2 DIABETES MELLITUS WITH DIABETIC POLYNEUROPATHY, WITH LONG-TERM CURRENT USE OF INSULIN: ICD-10-CM

## 2025-02-05 DIAGNOSIS — I87.2 VENOUS INSUFFICIENCY: ICD-10-CM

## 2025-02-05 PROCEDURE — 99213 OFFICE O/P EST LOW 20 MIN: CPT | Mod: PBBFAC,25,PN | Performed by: STUDENT IN AN ORGANIZED HEALTH CARE EDUCATION/TRAINING PROGRAM

## 2025-02-05 PROCEDURE — 93005 ELECTROCARDIOGRAM TRACING: CPT | Mod: PBBFAC,PN | Performed by: STUDENT IN AN ORGANIZED HEALTH CARE EDUCATION/TRAINING PROGRAM

## 2025-02-05 PROCEDURE — 99999 PR PBB SHADOW E&M-EST. PATIENT-LVL III: CPT | Mod: PBBFAC,,, | Performed by: STUDENT IN AN ORGANIZED HEALTH CARE EDUCATION/TRAINING PROGRAM

## 2025-02-05 PROCEDURE — 99215 OFFICE O/P EST HI 40 MIN: CPT | Mod: S$PBB,,, | Performed by: STUDENT IN AN ORGANIZED HEALTH CARE EDUCATION/TRAINING PROGRAM

## 2025-02-05 PROCEDURE — 93010 ELECTROCARDIOGRAM REPORT: CPT | Mod: S$PBB,,, | Performed by: STUDENT IN AN ORGANIZED HEALTH CARE EDUCATION/TRAINING PROGRAM

## 2025-02-05 NOTE — PROGRESS NOTES
Cardiology Clinic Visit    History of Present Illness:       Terra Mcpherson is a pleasant 69 y.o. female with PMHx of CAD s/p PCI of Diagonal 3 with residual disease in mid LAD, DM2 here post hospital discharge here with her daughter. Patient had a complicated course during first cath she had a cardiac arrest from what is suspected was allergic reaction she was intubated and extubated within less than an hour with full recovery. She reports her symptoms are feeling significant better she PCI to diagonal. Currently asymptomatic, tolerating meds w/o s/e. Started on praluent and zetia since she has allergies to statin. Denies cp, sob, palpitations, presyncope/dizziness, syncope, orthopnea, PND, bendopnea, decrease ET, NVDC, fever, chills.    2/5/24  Reports that she has been feeling of balanced a/w a chest pain described as a zing. Also, she is concern about chronic muscle pain and chronic fatigue- she thinks it could be possible fibromyalgia. Started rehab and she is able to complete her exercise. Symptoms improved when she is laying perfectly flat.       Off note: during the first cath - she was on the table and developed cardiac arrrest from unclear etiology. Given significant D3 we stage intervention with anesthesia, latex free gloves with no issues.     Echo  11/29/24    Left Ventricle: The left ventricle is normal in size. Normal wall thickness. There is concentric remodeling. Normal wall motion. There is normal systolic function with a visually estimated ejection fraction of 60 - 65%. Quantitated ejection fraction is 71%. There is normal diastolic function.    Right Ventricle: Normal right ventricular cavity size. Systolic function is normal.    Left Atrium: Normal left atrial size.    Mitral Valve: There is mild regurgitation.    Pulmonary Artery: The estimated pulmonary artery systolic pressure is 31 mmHg.    IVC/SVC: Normal venous pressure at 3 mmHg.    History obtained by patient interview and  supplemented by nursing documentation and chart review.   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   SurgHx:  has a past surgical history that includes Hysterectomy; Dilation and curettage of uterus; Esophagogastroduodenoscopy; Vaginal delivery; Eye surgery (Bilateral); Esophagogastroduodenoscopy (N/A, 11/22/2023); Left heart catheterization (Left, 10/9/2024); ivus, coronary (10/9/2024); Left heart catheterization (Left, 10/11/2024); Coronary stent placement (Left, 10/11/2024); instantaneous wave-free ratio (ifr) (Left, 10/11/2024); percutaneous coronary intervention, artery (N/A, 10/11/2024); and Coronary angiography (N/A, 10/11/2024).   FamHx: family history includes Colon cancer in an other family member; Diabetes in her brother, brother, maternal grandmother, mother, and sister; No Known Problems in her father.   SocialHx:  reports that she has never smoked. She has never been exposed to tobacco smoke. She has never used smokeless tobacco. She reports current drug use. Drug: Other-see comments. She reports that she does not drink alcohol.  Home Meds:  Current Outpatient Medications   Medication Instructions    aspirin (ECOTRIN) 81 mg, Every morning    BASAGLAR KWIKPEN U-100 INSULIN 34 Units, Subcutaneous, Daily    blood-glucose sensor (FREESTYLE ASMITA 3 SENSOR) Vicenta by Misc.(Non-Drug; Combo Route) route.    clopidogreL (PLAVIX) 75 mg, Oral, Daily    ezetimibe (ZETIA) 10 mg, Oral, Daily    FARXIGA 10 mg, Oral, Daily    metoprolol tartrate (LOPRESSOR) 25 mg, Oral, 2 times daily    multivit-min/iron/FA/vit K/lut (CENTRUM SILVER WOMEN ORAL) 1 tablet, Daily    PRALUENT PEN 75 mg, Subcutaneous, Every 14 days    RESTASIS 0.05 % ophthalmic emulsion 1 drop, Both Eyes, Nightly    triamcinolone acetonide 0.1% (KENALOG) 0.1 % cream Topical (Top), 2 times daily  "      Review of Systems: Comprehensive ROS was performed and is negative unless otherwise noted in HPI.   Objective   Objective/Exam:   /64 (BP Location: Right arm, Patient Position: Sitting)   Pulse 66   Ht 5' 5" (1.651 m)   Wt 72.3 kg (159 lb 8 oz)   SpO2 95%   BMI 26.54 kg/m²    Wt Readings from Last 4 Encounters:   02/05/25 72.3 kg (159 lb 8 oz)   01/10/25 74.1 kg (163 lb 5.8 oz)   12/05/24 73 kg (161 lb)   11/29/24 73 kg (161 lb)      Constitutional: NAD, Atraumatic, Conversant   HEENT: MMM, Sclera anicteric, No JVD   Cardiovascular: RRR, no murmurs noted, no chest tenderness to palpation, 2+ radial pulses b/l  Pulmonary: normal respiratory effort, CTAB, no crackles, wheezes  Abdominal: S/NT/ND  Musculoskeletal: No lower extremity edema noted b/l  Neurological: No gross neurological deficits  Skin: W/D/I  Psych: Appropriate affect, normal mood  Labs/Imaging/Procedures   Personally reviewed  Lab Results   Component Value Date     10/13/2024    K 3.8 10/13/2024     10/13/2024    CO2 23 10/13/2024    BUN 21 10/13/2024    CREATININE 0.9 10/13/2024    ANIONGAP 10 10/13/2024     Lab Results   Component Value Date    HGBA1C 7.5 (H) 10/08/2024     Lab Results   Component Value Date    BNP 20 10/09/2024      Lab Results   Component Value Date    WBC 14.01 (H) 10/13/2024    HGB 13.8 10/13/2024    HCT 37.4 10/13/2024     10/13/2024    GRAN 10.9 (H) 10/11/2024    GRAN 68.4 10/11/2024     Lab Results   Component Value Date    CHOL 281 (H) 10/08/2024    HDL 40 10/08/2024    LDLCALC 195.8 (H) 10/08/2024    LDLCALC Invalid, Trig>400.0 07/16/2024    LDLCALC 195.8 (H) 05/10/2024    TRIG 226 (H) 10/08/2024     Lab Results   Component Value Date    TSH 1.641 05/10/2024     No results found for: "APOLIPOPROTE"  No results found for: "LIPOA"     TTE:  Results for orders placed during the hospital encounter of 10/08/24    Echo    Interpretation Summary    Left Ventricle: Mild global hypokinesis present. " There is low normal systolic function with a visually estimated ejection fraction of 50 - 55%.    Right Ventricle: Normal right ventricular cavity size. Wall thickness is normal. Systolic function is normal.    IVC/SVC: Patient is ventilated, cannot use IVC diameter to estimate right atrial pressure.    Stress Testing:   Results for orders placed during the hospital encounter of 06/17/24    Exercise Stress - EKG    Interpretation Summary    The patient exercised for 6 minutes 5 seconds on a Benedict protocol, corresponding to a functional capacity of 7METS, achieving a peak heart rate of 151 bpm, which is 105% of the age predicted maximum heart rate.    The ECG portion of the study is abnormal but not diagnostic.    The patient reported no chest pain during the stress test.     Coronary Angiogram:  Results for orders placed during the hospital encounter of 10/08/24    Cardiac catheterization    Conclusion  Table formatting from the original result was not included.      The estimated blood loss was none.    [facility] Intensive Care  Surgery Department  Operative Note    SUMMARY  POST CATH NOTE    Date of Procedure: 10/11/2024    Procedure: Procedure(s) (LRB):  Left heart cath (Left)  INSERTION, STENT, CORONARY ARTERY (Left)  Instantaneous Wave-Free Ratio (IFR) (Left)  Percutaneous coronary intervention (N/A)  ANGIOGRAM, CORONARY ARTERY (N/A)    Surgeons and Role:  * Jesus Gurrola MD - Primary    Assisting Surgeon: None    Pre-Operative Diagnosis: Coronary artery disease involving native coronary artery of native heart with unstable angina pectoris [I25.110]    Post-Operative Diagnosis: Post-Op Diagnosis Codes:  * Coronary artery disease involving native coronary artery of native heart with unstable angina pectoris [I25.110]    s/p catheterization secondary to:    Cath Results:  Access: US guided RCFA    Coronary Anatomy:    Left Main Coronary Artery: The left main is a large and short caliber vessel arising  "normally from the left sinus of valsalva.  It bifurcates into the left anterior descending and left circumflex coronary artery.  It is free of evidence of obstructive coronary artery disease. JUANITA III flow    Left Anterior Descending: The left anterior descending coronary artery is a large caliber vessel arising normally from the left main and extending to the apex.  It gives rise to  caliber diagonal arteries. Mid LAD with 50-60% stenosis, D3 with proximal 99% stenosis.    Left Circumflex: The left circumflex is a large caliber vessel arising normally from the left main coronary artery, it is co-dominant.  It gives rise to moderate caliber obtuse marginal branches. OM1 with 20-30% stenosis.  The circumflex system is free of obstructive coronary artery disease. JUANITA III flow      Intervention:  PCI procedure:  Heparin administered. ACT was closely monitored. Using a JL 4 guider, this was used to cannulate the left system. Runthrough PCI wire repshaped outside the body and advanced to D3. Predilated at nominal pressure with 2.5 mm SC. Advanced a 2.5 x 12 mm MENG at deployed at nominal pressure with great results. Post dilated with 2.5 x 8 mm NC at nominal pressure. After the balloon was removed.  The wire was left in place.  Repeat angiography showed no signs of dissection.  Subsequently the wire was pulled back. Final angiography showed JUANITA-3 flow.  No signs of dissection, perforation, or thrombus.    iFR  The 0.014" Omniwire was connected to the console, calibrated and equalized. The 0.014"  Omniwire was then advanced into the left main outside of the catheter and flushed with saline  with the introducer removed. The pressure was normalized and then the wire was advanced  across D3 the lesion. 0.92 and 0.93 iFR recordings were obtained. The 0.014" Omniwire was then pulled  back slowly across the lesion to assess for step up and electronic drift. The wire was removed,  and final angiography was " "performed.      Closure device: Perclose  Patient tolerated procedure well, no complications    Post Cath Exam:  BP (!) 112/57   Pulse (!) 57   Temp 97 °F (36.1 °C) (Skin)   Resp 14   Ht 5' 5" (1.651 m)   Wt 72 kg (158 lb 11.7 oz)   SpO2 (!) 91%   BMI 26.41 kg/m²    Anesthesia: Monitor Anesthesia Care      Estimated Blood Loss (EBL): * No values recorded between 10/11/2024  8:31 AM and 10/11/2024  9:14 AM *    Specimens:  Specimen (24h ago, onward)    None            Assessment:  Successful PCI of D3 with 2.5 x 12 mm MENG  Negative iFr of mLAD (0.92)- will medically manage    Plan:  - Patient tolerated procedure well. No immediate complications  - Continue aspirin and Plavix for 1 year, then aspirin for life  - EKG when arrives to floor  - Routine post cath protocol  - Maximize medical management  - Further care by the primary team  - IVF at  100cc/hr  for 2 hours  - Aggressive Lipid lowering medicine with repatha.  - Follow up with me    -Reviewing Medical records & lab results  -Independently reviewing and interpreting (if not documented by myself) EKGs, echocardiograms, catherizations   -Obtaining a history, performing a relevant exam, counseling/educating the patient/family  -Documenting clinical information in the EMR including ordering of tests  -Care coordination and communicating with other health care providers       Problem List:     1. Coronary artery disease involving native coronary artery of native heart with unstable angina pectoris    2. Mixed hyperlipidemia    3. Aortic atherosclerosis    4. Type 2 diabetes mellitus with diabetic polyneuropathy, with long-term current use of insulin    5. Statin intolerance    6. Contrast media allergy    7. Venous insufficiency        Assessment/Plan:   Terra Mcpherson is a 69 y.o. female with a PMHx of CAD s/p PCI and DM2 presenting to [facility] care post hospital follow up    CAD s/p PCI--dapt, BB, imdur praluent, zetia. Check labs next visit. Repeat " labs. Patient concern about praluent and muscle aches. Labs--> if LDL is wnl she will like to hold praluent and see is muscle aches improves. If LDL goes up will start leqvio.  DM2 A1c- 7.5. Managed by PCP  HLD continue zetia and praluent.  Labs--> if LDL is fine awill hold praluent and see is muscle aches improves. If LDL goes up will start leqvio      Preventative Care:  Lipids:  PVD(V/A)      Patient expressed verbal understanding and agreed with the plan     Return sooner for concerns or questions. If symptoms persist go to the ED.  I have reviewed all pertinent data including patient's medical history in detail and updated the computerized patient record.     Total time spent counseling greater than fifty percent of total visit time.  Counseling included discussion regarding imaging findings, diagnosis, possibilities, treatment options, risks and benefits.      Thank you for the opportunity to care for this patient. Please don't hesitate to reach out with any questions/concerns         Jesus Sotelo MD  Cardiovascular Disease; Interventional Cardiology  [facility]       Statement Selected

## 2025-02-05 NOTE — PROGRESS NOTES
02/05/2025  VB chart audit performed. Care Everywhere updates requested and reviewed  Overdue HM topic chart audit and/or requested. LINKS triggered and reconciled. Media reviewed Lvm/portal sent regarding overdue health topics

## 2025-02-06 PROBLEM — I70.0 AORTIC ATHEROSCLEROSIS: Status: ACTIVE | Noted: 2025-02-06

## 2025-02-06 LAB
OHS QRS DURATION: 90 MS
OHS QTC CALCULATION: 443 MS

## 2025-02-10 ENCOUNTER — OFFICE VISIT (OUTPATIENT)
Dept: FAMILY MEDICINE | Facility: CLINIC | Age: 70
End: 2025-02-10
Payer: MEDICARE

## 2025-02-10 ENCOUNTER — TELEPHONE (OUTPATIENT)
Dept: ENDOCRINOLOGY | Facility: CLINIC | Age: 70
End: 2025-02-10
Payer: MEDICARE

## 2025-02-10 VITALS
SYSTOLIC BLOOD PRESSURE: 120 MMHG | WEIGHT: 160.19 LBS | OXYGEN SATURATION: 97 % | BODY MASS INDEX: 26.69 KG/M2 | DIASTOLIC BLOOD PRESSURE: 80 MMHG | HEART RATE: 80 BPM | HEIGHT: 65 IN

## 2025-02-10 DIAGNOSIS — D53.9 NUTRITIONAL ANEMIA, UNSPECIFIED: ICD-10-CM

## 2025-02-10 DIAGNOSIS — E78.2 MIXED HYPERLIPIDEMIA: ICD-10-CM

## 2025-02-10 DIAGNOSIS — Z23 NEED FOR TDAP VACCINATION: Primary | ICD-10-CM

## 2025-02-10 DIAGNOSIS — G72.0 STATIN MYOPATHY: ICD-10-CM

## 2025-02-10 DIAGNOSIS — E11.42 TYPE 2 DIABETES MELLITUS WITH DIABETIC POLYNEUROPATHY, WITH LONG-TERM CURRENT USE OF INSULIN: ICD-10-CM

## 2025-02-10 DIAGNOSIS — Z79.4 TYPE 2 DIABETES MELLITUS WITH DIABETIC POLYNEUROPATHY, WITH LONG-TERM CURRENT USE OF INSULIN: ICD-10-CM

## 2025-02-10 DIAGNOSIS — T46.6X5A STATIN MYOPATHY: ICD-10-CM

## 2025-02-10 DIAGNOSIS — F41.9 ANXIETY: ICD-10-CM

## 2025-02-10 DIAGNOSIS — G72.9 MYOPATHY: ICD-10-CM

## 2025-02-10 DIAGNOSIS — Z12.11 SCREENING FOR COLON CANCER: ICD-10-CM

## 2025-02-10 PROCEDURE — 99214 OFFICE O/P EST MOD 30 MIN: CPT | Mod: PBBFAC,PN | Performed by: STUDENT IN AN ORGANIZED HEALTH CARE EDUCATION/TRAINING PROGRAM

## 2025-02-10 PROCEDURE — 99999 PR PBB SHADOW E&M-EST. PATIENT-LVL IV: CPT | Mod: PBBFAC,,, | Performed by: STUDENT IN AN ORGANIZED HEALTH CARE EDUCATION/TRAINING PROGRAM

## 2025-02-10 NOTE — TELEPHONE ENCOUNTER
----- Message from Keena sent at 2/10/2025 11:44 AM CST -----  Patient would like to have a call back about her medication. She is having some issues. Please call back at [phone].

## 2025-02-10 NOTE — PROGRESS NOTES
Patient ID: Terra Mcpherson is a 69 y.o. female.    Chief Complaint: Follow-up    History of Present Illness    CHIEF COMPLAINT:  Terra presents today for follow up of multiple symptoms including fatigue, pain, and erratic glucose levels.    HISTORY OF PRESENT ILLNESS:  She reports burning muscle pain throughout her body from head to toe, describing it as constant and more neurological than muscular in nature. She experiences headaches, nausea, stomach upset, bloating, gas, and indigestion. Fatigue significantly impacts her cardiac rehabilitation performance, reporting difficulty completing sessions and feeling exhausted afterwards. These symptoms are not consistent with typical muscle reactions to physical exertion.    DIABETES MANAGEMENT:  She experiences erratic glucose levels, particularly during nighttime hours, with episodes of blood sugar spikes and dips. She recently experienced an episode of severe coldness with decreased body temperature and glucose, requiring external warming measures to normalize. She follows a ketogenic diet for blood sugar management. Recently started Farxiga as prescribed by endocrinologist to reduce insulin usage, but reports worsening symptoms since the change. She continues insulin without issues.    CARDIAC HISTORY:  She has history of cardiac arrest and stent placement in October. Currently participating in cardiac rehabilitation program with monitored exercise sessions.    MENTAL HEALTH:  She reports worsening depression following cardiac events, though anxiety has been gradually improving. She does not have a current therapist but expresses interest in obtaining one for support.    MEDICATIONS:  Current medications include metoprolol 25 mg twice daily with associated fatigue, electrolyte supplements (capsule in morning and liquid several times weekly), and insulin. She is unable to tolerate statins due to significant pain and expresses desire to discontinue all medications  if possible.      ROS:  General: -fever, -chills, +fatigue, -weight gain, -weight loss  Eyes: -vision changes, -redness, -discharge  ENT: -ear pain, -nasal congestion, -sore throat  Cardiovascular: -chest pain, -palpitations, -lower extremity edema  Respiratory: -cough, -shortness of breath  Gastrointestinal: -abdominal pain, +nausea, -vomiting, -diarrhea, -constipation, -blood in stool, +bloating  Genitourinary: -dysuria, -hematuria, -frequency  Musculoskeletal: -joint pain, +muscle pain  Skin: -rash, -lesion  Neurological: +headache, -dizziness, -numbness, -tingling  Psychiatric: -anxiety, +depression, -sleep difficulty  Endocrine: +cold intolerance         Physical Exam    General: No acute distress. Well-developed. Well-nourished.  Eyes: EOMI. Sclerae anicteric.  HENT: Normocephalic. Atraumatic. Nares patent. Moist oral mucosa.  Ears: Bilateral TMs clear. Bilateral EACs clear.  Cardiovascular: Regular rate. Regular rhythm. No murmurs. No rubs. No gallops. Normal S1, S2.  Respiratory: Normal respiratory effort. Clear to auscultation bilaterally. No rales. No rhonchi. No wheezing.  Abdomen: Soft. Non-tender. Non-distended. Normoactive bowel sounds.  Musculoskeletal: No  obvious deformity.  Extremities: No lower extremity edema.  Neurological: Alert & oriented x3. No slurred speech. Normal gait.  Psychiatric: Normal mood. Normal affect. Good insight. Good judgment.  Skin: Warm. Dry. No rash.         Assessment & Plan    MAJOR DEPRESSIVE DISORDER, SINGLE EPISODE, MODERATE:  - Recognized potential link between cardiac arrest and current symptoms, including depression.  - Terra reports feeling depressed, tired, and experiencing constant pain, with worsening depression following the October cardiac event.  - However, anxiety has gradually improved since then.  - Acknowledged that depressive symptoms post-cardiac arrest are common.  - Referred patient to behavioral health for therapy to address post-cardiac arrest  depression and anxiety.  - Planned labs to rule out other potential causes of symptoms.    DIABETES:  - Terra reports erratic glucose levels, including nighttime spikes and dips.  - This may be related to post-cardiac arrest syndrome and medication side effects.  - Terra is on insulin and recently started Farxiga, with endocrinologist reducing insulin dose.  - Experienced a low glucose episode with low body temperature before the snow week.  - Evaluated need for medication adjustments, particularly Farxiga, due to reported side effects.  - Assessed potential benefits of GLP-1 receptor agonists as alternative to current diabetes management.  - Planned to check labs, including A1C, noting previous labs was done around November.  - Coordinated care with Dr. Moss (endocrinologist) for diabetes management and potential medication changes, including discussion on insulin management.  - Advised patient to contact office to schedule an appointment with Dr. Moss sooner than originally planned.    POST-CARDIAC ARREST SYNDROME:  - Terra had a cardiac arrest in October, leading to significant life changes, including a stent placement.  - Explained that post-cardiac arrest syndrome can cause various symptoms including pain, fatigue, anxiety, and depression, which may persist for months.  - Terra reports widespread pain, burning sensation, muscle throbbing from head to toe, severe fatigue, difficulty completing rehab program, exhaustion after physical activity, headaches, nausea, and stomach upset.  - Informed patient that adjusting to new medications after a cardiac event can take time.    HYPERLIPIDEMIA:  - Ordered comprehensive labs including lipid panel.  - Noted patient is on Zetia for lipid management.  - Considered potential medication changes based on upcoming lipid panel results.    HYPERTENSION:  - Noted patient is on metoprolol 25mg twice daily for blood pressure management.  - Evaluated need for  medication adjustments, particularly metoprolol, due to reported side effects.  - Considered alternative to metoprolol if fatigue persists.    LABS:  - Planned to check labs, including vitamin D, vitamin B12, iron panel, magnesium, and thyroid function to rule out other causes of symptoms, particularly fatigue.    GASTROINTESTINAL ISSUES:  - Noted patient reports bloating, gas, and indigestion, possibly related to medication.    CARDIAC REHABILITATION:  - Confirmed patient is attending cardiac rehab with monitoring for confidence in exercising.  - Diann  e to continue attending cardiac rehabilitation program.    OTHER INSTRUCTIONS:  - Terra to maintain current diet and hydration practices.       1. Need for Tdap vaccination  -     Ferritin; Future; Expected date: 02/10/2025  -     Iron and TIBC; Future; Expected date: 02/10/2025  -     Transferrin; Future; Expected date: 02/10/2025  -     TSH; Future; Expected date: 02/10/2025    2. Myopathy  -     Misc Sendout Test, Blood severe fatigue; Future; Expected date: 02/10/2025  -     VITAMIN B12; Future; Expected date: 02/10/2025  -     Comprehensive Metabolic Panel; Future; Expected date: 02/10/2025  -     CBC Auto Differential; Future; Expected date: 02/10/2025  -     Ferritin; Future; Expected date: 02/10/2025  -     Iron and TIBC; Future; Expected date: 02/10/2025  -     Transferrin; Future; Expected date: 02/10/2025  -     TSH; Future; Expected date: 02/10/2025  -     MAGNESIUM; Future; Expected date: 02/10/2025    3. Nutritional anemia, unspecified  -     VITAMIN B12; Future; Expected date: 02/10/2025  -     Ferritin; Future; Expected date: 02/10/2025  -     Iron and TIBC; Future; Expected date: 02/10/2025  -     Transferrin; Future; Expected date: 02/10/2025    4. Anxiety  -     Ambulatory referral/consult to Behavioral Health; Future; Expected date: 02/17/2025    5. Mixed hyperlipidemia  -     Lipid Panel; Future; Expected date: 02/10/2025    6. Screening for  colon cancer  -     Cologuard Screening (Multitarget Stool DNA); Future; Expected date: 02/10/2025    7. Statin myopathy    8. Type 2 diabetes mellitus with diabetic polyneuropathy, with long-term current use of insulin  Assessment & Plan:  Protective Sensation (w/ 10 gram monofilament):  Right: Intact  Left: Intact    Visual Inspection:  Normal -  Bilateral    Pedal Pulses:   Right: Present  Left: Present    Posterior Tibialis Pulses:   Right:Present  Left: Present                 No follow-ups on file.    This note was generated with the assistance of ambient listening technology. Verbal consent was obtained by the patient and accompanying visitor(s) for the recording of patient appointment to facilitate this note. I attest to having reviewed and edited the generated note for accuracy, though some syntax or spelling errors may persist. Please contact the author of this note for any clarification.    Answers submitted by the patient for this visit:  Review of Systems Questionnaire (Submitted on 2/9/2025)  activity change: No  unexpected weight change: No  neck pain: No  hearing loss: No  rhinorrhea: No  trouble swallowing: No  eye discharge: No  visual disturbance: No  chest tightness: No  wheezing: No  chest pain: No  palpitations: No  blood in stool: No  constipation: No  vomiting: No  diarrhea: No  polydipsia: No  polyuria: No  difficulty urinating: No  hematuria: No  menstrual problem: No  dysuria: No  joint swelling: No  arthralgias: Yes  headaches: Yes  weakness: No  confusion: No  dysphoric mood: Yes

## 2025-02-20 ENCOUNTER — OFFICE VISIT (OUTPATIENT)
Dept: PSYCHIATRY | Facility: CLINIC | Age: 70
End: 2025-02-20
Payer: MEDICARE

## 2025-02-20 VITALS
DIASTOLIC BLOOD PRESSURE: 65 MMHG | HEART RATE: 68 BPM | BODY MASS INDEX: 26.8 KG/M2 | SYSTOLIC BLOOD PRESSURE: 105 MMHG | WEIGHT: 161.06 LBS

## 2025-02-20 DIAGNOSIS — F41.9 ANXIETY: ICD-10-CM

## 2025-02-20 DIAGNOSIS — F32.1 MAJOR DEPRESSIVE DISORDER, SINGLE EPISODE, MODERATE: Primary | ICD-10-CM

## 2025-02-20 PROCEDURE — 99999 PR PBB SHADOW E&M-EST. PATIENT-LVL IV: CPT | Mod: PBBFAC,,,

## 2025-02-20 PROCEDURE — 99214 OFFICE O/P EST MOD 30 MIN: CPT | Mod: PBBFAC,PN
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
## 2025-02-21 ENCOUNTER — PATIENT OUTREACH (OUTPATIENT)
Dept: ADMINISTRATIVE | Facility: HOSPITAL | Age: 70
End: 2025-02-21
Payer: MEDICARE

## 2025-02-24 DIAGNOSIS — Z00.00 ENCOUNTER FOR MEDICARE ANNUAL WELLNESS EXAM: ICD-10-CM

## 2025-02-25 ENCOUNTER — E-VISIT (OUTPATIENT)
Dept: CARDIOLOGY | Facility: CLINIC | Age: 70
End: 2025-02-25
Payer: MEDICARE

## 2025-02-25 DIAGNOSIS — Z78.9 STATIN INTOLERANCE: ICD-10-CM

## 2025-02-25 DIAGNOSIS — Z79.4 TYPE 2 DIABETES MELLITUS WITH DIABETIC POLYNEUROPATHY, WITH LONG-TERM CURRENT USE OF INSULIN: ICD-10-CM

## 2025-02-25 DIAGNOSIS — E78.2 MIXED HYPERLIPIDEMIA: Primary | ICD-10-CM

## 2025-02-25 DIAGNOSIS — E11.42 TYPE 2 DIABETES MELLITUS WITH DIABETIC POLYNEUROPATHY, WITH LONG-TERM CURRENT USE OF INSULIN: ICD-10-CM

## 2025-02-25 DIAGNOSIS — I25.110 CORONARY ARTERY DISEASE INVOLVING NATIVE CORONARY ARTERY OF NATIVE HEART WITH UNSTABLE ANGINA PECTORIS: ICD-10-CM

## 2025-02-25 PROCEDURE — 99213 OFFICE O/P EST LOW 20 MIN: CPT | Mod: ,,, | Performed by: STUDENT IN AN ORGANIZED HEALTH CARE EDUCATION/TRAINING PROGRAM

## 2025-02-26 NOTE — PROGRESS NOTES
MsCheryl May,   I am reviewing your labs from Dr. Lopez which are normal. Your cardiovascular meds should not be causing these symptoms. Maybe your PCP can referred your to a rheumatologist for further assessment. Please let me know if you have any questions.

## 2025-02-28 DIAGNOSIS — I46.9 CARDIAC ARREST: ICD-10-CM

## 2025-02-28 DIAGNOSIS — I25.110 CORONARY ARTERY DISEASE INVOLVING NATIVE CORONARY ARTERY OF NATIVE HEART WITH UNSTABLE ANGINA PECTORIS: Primary | ICD-10-CM

## 2025-03-03 ENCOUNTER — HOSPITAL ENCOUNTER (OUTPATIENT)
Dept: CARDIOLOGY | Facility: HOSPITAL | Age: 70
Discharge: HOME | End: 2025-03-03
Attending: STUDENT IN AN ORGANIZED HEALTH CARE EDUCATION/TRAINING PROGRAM
Payer: MEDICARE

## 2025-03-03 VITALS
BODY MASS INDEX: 26.82 KG/M2 | DIASTOLIC BLOOD PRESSURE: 75 MMHG | HEART RATE: 61 BPM | WEIGHT: 161 LBS | SYSTOLIC BLOOD PRESSURE: 143 MMHG | HEIGHT: 65 IN

## 2025-03-03 DIAGNOSIS — I25.110 CORONARY ARTERY DISEASE INVOLVING NATIVE CORONARY ARTERY OF NATIVE HEART WITH UNSTABLE ANGINA PECTORIS: ICD-10-CM

## 2025-03-03 DIAGNOSIS — I46.9 CARDIAC ARREST: ICD-10-CM

## 2025-03-03 LAB
CFR FLOW - ANTERIOR: 3.27
CFR FLOW - INFERIOR: 3.28
CFR FLOW - LATERAL: 3.15
CFR FLOW - MAX: 4.53
CFR FLOW - MIN: 2.42
CFR FLOW - SEPTAL: 3.54
CFR FLOW - WHOLE HEART: 3.31
CV PHARM DOSE: 0.4 MG
CV STRESS BASE HR: 61 BPM
DIASTOLIC BLOOD PRESSURE: 75 MMHG
EJECTION FRACTION- HIGH: 59 %
END DIASTOLIC INDEX-HIGH: 155 ML/M2
END DIASTOLIC INDEX-LOW: 91 ML/M2
END SYSTOLIC INDEX-HIGH: 78 ML/M2
END SYSTOLIC INDEX-LOW: 40 ML/M2
NUC REST DIASTOLIC VOLUME INDEX: 60
NUC REST EJECTION FRACTION: 66
NUC REST SYSTOLIC VOLUME INDEX: 21
NUC STRESS DIASTOLIC VOLUME INDEX: 66
NUC STRESS EJECTION FRACTION: 72 %
NUC STRESS SYSTOLIC VOLUME INDEX: 18
OHS CV CPX 85 PERCENT MAX PREDICTED HEART RATE MALE: 128
OHS CV CPX MAX PREDICTED HEART RATE: 150
OHS CV CPX PATIENT IS FEMALE: 1
OHS CV CPX PATIENT IS MALE: 0
OHS CV CPX PEAK DIASTOLIC BLOOD PRESSURE: 64 MMHG
OHS CV CPX PEAK HEAR RATE: 56 BPM
OHS CV CPX PEAK RATE PRESSURE PRODUCT: 5936
OHS CV CPX PEAK SYSTOLIC BLOOD PRESSURE: 106 MMHG
OHS CV CPX PERCENT MAX PREDICTED HEART RATE ACHIEVED: 39
OHS CV CPX RATE PRESSURE PRODUCT PRESENTING: 8723
OHS CV INITIAL DOSE: 22.9 MCG/KG/MIN
OHS CV MODERATELY REDUCED FLOW CAPACITY: 0 %
OHS CV MYOCARDIAL STEAL: 0 %
OHS CV NO ISCHEMIA MILDLY REDUCED FLOW CAPACTY: 1 %
OHS CV NO ISCHEMIA MINIMALLY REDUCED FLOW CAPACITY: 13 %
OHS CV NORMAL FLOW CAPACITY COMPARABLE TO HEALTHY YOUNG VOLUNTEERS: 86 %
OHS CV PEAK DOSE: 22.8 MCG/KG/MIN
OHS CV PET ID: 8283
OHS CV PRE-DOMINANTLY MYOCARDIAL SCAR: 0 %
OHS CV SEVERELY REDUCED FLOW CAPACITY LARGEST SINGLE CONTINUOUS REGION: 0 %
OHS CV SEVERELY REDUCED FLOW CAPACITY: 0 %
OHS CV TOTAL EXAM DLP: 289.83 MGY-CM
REST FLOW - ANTERIOR: 0.49 CC/MIN/G
REST FLOW - INFERIOR: 0.42 CC/MIN/G
REST FLOW - LATERAL: 0.52 CC/MIN/G
REST FLOW - MAX: 0.72 CC/MIN/G
REST FLOW - MIN: 0.2 CC/MIN/G
REST FLOW - SEPTAL: 0.42 CC/MIN/G
REST FLOW - WHOLE HEART: 0.46 CC/MIN/G
RETIRED EF AND QEF - SEE NOTES: 47 %
STRESS FLOW - ANTERIOR: 1.56 CC/MIN/G
STRESS FLOW - INFERIOR: 1.38 CC/MIN/G
STRESS FLOW - LATERAL: 1.63 CC/MIN/G
STRESS FLOW - MAX: 2 CC/MIN/G
STRESS FLOW - MIN: 0.49 CC/MIN/G
STRESS FLOW - SEPTAL: 1.5 CC/MIN/G
STRESS FLOW - WHOLE HEART: 1.52 CC/MIN/G
SYSTOLIC BLOOD PRESSURE: 143 MMHG

## 2025-03-03 PROCEDURE — 78431 MYOCRD IMG PET RST&STRS CT: CPT

## 2025-03-03 PROCEDURE — 63600175 PHARM REV CODE 636 W HCPCS: Performed by: STUDENT IN AN ORGANIZED HEALTH CARE EDUCATION/TRAINING PROGRAM

## 2025-03-03 PROCEDURE — 78434 AQMBF PET REST & RX STRESS: CPT | Mod: 26,,, | Performed by: INTERNAL MEDICINE

## 2025-03-03 PROCEDURE — 93016 CV STRESS TEST SUPVJ ONLY: CPT | Mod: ,,, | Performed by: INTERNAL MEDICINE

## 2025-03-03 PROCEDURE — 93018 CV STRESS TEST I&R ONLY: CPT | Mod: ,,, | Performed by: INTERNAL MEDICINE

## 2025-03-03 PROCEDURE — 78431 MYOCRD IMG PET RST&STRS CT: CPT | Mod: 26,,, | Performed by: INTERNAL MEDICINE

## 2025-03-03 PROCEDURE — A9555 RB82 RUBIDIUM: HCPCS | Performed by: STUDENT IN AN ORGANIZED HEALTH CARE EDUCATION/TRAINING PROGRAM

## 2025-03-03 RX ORDER — AMINOPHYLLINE 25 MG/ML
75 INJECTION, SOLUTION INTRAVENOUS ONCE
Status: COMPLETED | OUTPATIENT
Start: 2025-03-03 | End: 2025-03-03

## 2025-03-03 RX ORDER — REGADENOSON 0.08 MG/ML
0.4 INJECTION, SOLUTION INTRAVENOUS
Status: COMPLETED | OUTPATIENT
Start: 2025-03-03 | End: 2025-03-03

## 2025-03-03 RX ADMIN — RUBIDIUM CHLORIDE RB-82 22.9 MILLICURIE: 150 INJECTION, SOLUTION INTRAVENOUS at 10:03

## 2025-03-03 RX ADMIN — REGADENOSON 0.4 MG: 0.08 INJECTION, SOLUTION INTRAVENOUS at 10:03

## 2025-03-03 RX ADMIN — RUBIDIUM CHLORIDE RB-82 22.8 MILLICURIE: 150 INJECTION, SOLUTION INTRAVENOUS at 10:03

## 2025-03-03 RX ADMIN — AMINOPHYLLINE 75 MG: 25 INJECTION, SOLUTION INTRAVENOUS at 10:03

## 2025-03-05 ENCOUNTER — RESULTS FOLLOW-UP (OUTPATIENT)
Dept: CARDIOLOGY | Facility: CLINIC | Age: 70
End: 2025-03-05
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
## 2025-03-07 ENCOUNTER — OFFICE VISIT (OUTPATIENT)
Dept: PSYCHIATRY | Facility: CLINIC | Age: 70
End: 2025-03-07
Payer: MEDICARE

## 2025-03-07 DIAGNOSIS — F41.9 ANXIETY DISORDER, UNSPECIFIED TYPE: ICD-10-CM

## 2025-03-07 DIAGNOSIS — F41.9 ANXIETY: ICD-10-CM

## 2025-03-07 DIAGNOSIS — F33.1 MAJOR DEPRESSIVE DISORDER, RECURRENT EPISODE, MODERATE: Primary | ICD-10-CM

## 2025-03-07 PROCEDURE — 99999 PR PBB SHADOW E&M-EST. PATIENT-LVL I: CPT | Mod: PBBFAC,,,

## 2025-03-07 PROCEDURE — 99211 OFF/OP EST MAY X REQ PHY/QHP: CPT | Mod: PBBFAC,PN
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
## 2025-03-21 ENCOUNTER — OFFICE VISIT (OUTPATIENT)
Dept: PSYCHIATRY | Facility: CLINIC | Age: 70
End: 2025-03-21
Payer: MEDICARE

## 2025-03-21 DIAGNOSIS — F41.9 ANXIETY DISORDER, UNSPECIFIED TYPE: ICD-10-CM

## 2025-03-21 DIAGNOSIS — F33.1 MAJOR DEPRESSIVE DISORDER, RECURRENT EPISODE, MODERATE: Primary | ICD-10-CM

## 2025-03-27 ENCOUNTER — RESULTS FOLLOW-UP (OUTPATIENT)
Dept: FAMILY MEDICINE | Facility: CLINIC | Age: 70
End: 2025-03-27

## 2025-03-27 DIAGNOSIS — E78.2 MIXED HYPERLIPIDEMIA: Primary | ICD-10-CM

## 2025-04-11 ENCOUNTER — OFFICE VISIT (OUTPATIENT)
Dept: ENDOCRINOLOGY | Facility: CLINIC | Age: 70
End: 2025-04-11
Payer: MEDICARE

## 2025-04-11 VITALS — WEIGHT: 161.38 LBS | BODY MASS INDEX: 26.89 KG/M2 | HEIGHT: 65 IN

## 2025-04-11 DIAGNOSIS — E66.3 OVERWEIGHT WITH BODY MASS INDEX (BMI) OF 26 TO 26.9 IN ADULT: ICD-10-CM

## 2025-04-11 DIAGNOSIS — E11.42 TYPE 2 DIABETES MELLITUS WITH DIABETIC POLYNEUROPATHY, WITH LONG-TERM CURRENT USE OF INSULIN: Primary | ICD-10-CM

## 2025-04-11 DIAGNOSIS — Z79.4 TYPE 2 DIABETES MELLITUS WITH DIABETIC POLYNEUROPATHY, WITH LONG-TERM CURRENT USE OF INSULIN: Primary | ICD-10-CM

## 2025-04-11 DIAGNOSIS — I25.110 CORONARY ARTERY DISEASE INVOLVING NATIVE CORONARY ARTERY OF NATIVE HEART WITH UNSTABLE ANGINA PECTORIS: ICD-10-CM

## 2025-04-11 PROCEDURE — 99213 OFFICE O/P EST LOW 20 MIN: CPT | Mod: PBBFAC | Performed by: STUDENT IN AN ORGANIZED HEALTH CARE EDUCATION/TRAINING PROGRAM

## 2025-04-11 PROCEDURE — 99999 PR PBB SHADOW E&M-EST. PATIENT-LVL III: CPT | Mod: PBBFAC,,, | Performed by: STUDENT IN AN ORGANIZED HEALTH CARE EDUCATION/TRAINING PROGRAM

## 2025-04-11 NOTE — ASSESSMENT & PLAN NOTE
Glucose controlled with TIR > 80% and average glucose 150 on CGM. Hyperglycemia variable depending on intake. No hypoglycemia.    She reports significant intolerance to SGLT-2 inhibitor so we will discontinue. Also intolerance and metformin. Given she has baseline GI symptoms we are avoiding GLP-1 RA and other PO regimens for now.    Basaglar will need to be changed because of insurance preferred agent. She however, has 10 pens left so will not send new prescription yet.    Plan  - Stop Farxiga  - Continue Basaglar 16 U daily, review CGM in 10 days, adjust insulin as needed  - Continue FreeStyle Augustin 3 CGM    A1c with next labs    F/u 3 months

## 2025-04-11 NOTE — PROGRESS NOTES
"Subjective:      Patient ID: Terra Mcpherson is a 69 y.o. female.    Chief Complaint:  Type 2 diabetes mellitus    History of Present Illness  This is a 69 y.o. female. with a past medical history of Type 2 diabetes mellitus, CAD s/p PCI here for follow up      Type 2 diabetes mellitus  Diagnosed around age 49    Current diabetes medications:  - Basaglar 16 U daily  - Farxiga 10 mg daily - GI side effects, headaches, fatigue, polyuria      Past diabetes medications:  - Metformin - intolerance        Lab Results   Component Value Date    CREATININE 0.8 02/12/2025    EGFRNORACEVR >60.0 02/12/2025       Known diabetic complications: peripheral neuropathy    Weight trend:  Wt Readings from Last 8 Encounters:   04/11/25 73.2 kg (161 lb 6.4 oz)   03/03/25 73 kg (161 lb)   02/20/25 73.1 kg (161 lb 0.7 oz)   02/10/25 72.7 kg (160 lb 2.6 oz)   02/05/25 72.3 kg (159 lb 8 oz)   01/10/25 74.1 kg (163 lb 5.8 oz)   12/05/24 73 kg (161 lb)   11/29/24 73 kg (161 lb)       Family history of diabetes:  Yes, mother and everyone in her side of the family  Siblings +  Children have been checked, negative for diabetes    Prior visit with diabetes education: yes    Current diet: 3 meals per day  Current exercise: On her feet all day    Blood glucose monitoring at home:               Diabetes Management Status  Statin: Not taking  ACE/ARB: Not taking    Screening or Prevention Patient's value   HgA1C Testing and Control   Lab Results   Component Value Date    HGBA1C 7.5 (H) 10/08/2024        LDL control Lab Results   Component Value Date    LDLCALC 137.6 02/12/2025      Nephropathy screening Lab Results   Component Value Date    MICALBCREAT 20.1 05/10/2024        Last eye exam: Most Recent Eye Exam Date: Not Found      Review of Systems  As above    Social and family history reviewed  Current medications and allergies reviewed    Objective:   Ht 5' 5" (1.651 m)   Wt 73.2 kg (161 lb 6.4 oz)   BMI 26.86 kg/m²   Physical Exam  Alert, " oriented    BP Readings from Last 1 Encounters:   03/03/25 (!) 143/75      Wt Readings from Last 1 Encounters:   04/11/25 0933 73.2 kg (161 lb 6.4 oz)     Body mass index is 26.86 kg/m².    Lab Review:   Lab Results   Component Value Date    HGBA1C 7.5 (H) 10/08/2024     Lab Results   Component Value Date    CHOL 229 (H) 02/12/2025    HDL 43 02/12/2025    LDLCALC 137.6 02/12/2025    TRIG 242 (H) 02/12/2025    CHOLHDL 18.8 (L) 02/12/2025     Lab Results   Component Value Date     02/12/2025    K 4.5 02/12/2025     (H) 02/12/2025    CO2 23 02/12/2025     (H) 02/12/2025    BUN 18 02/12/2025    CREATININE 0.8 02/12/2025    CALCIUM 9.3 02/12/2025    PROT 7.2 02/12/2025    ALBUMIN 4.1 02/12/2025    BILITOT 0.6 02/12/2025    ALKPHOS 61 02/12/2025    AST 19 02/12/2025    ALT 26 02/12/2025    ANIONGAP 10 02/12/2025    TSH 2.231 02/12/2025       All pertinent labs reviewed    Assessment and Plan     Type 2 diabetes mellitus with diabetic polyneuropathy, with long-term current use of insulin  Glucose controlled with TIR > 80% and average glucose 150 on CGM. Hyperglycemia variable depending on intake. No hypoglycemia.    She reports significant intolerance to SGLT-2 inhibitor so we will discontinue. Also intolerance and metformin. Given she has baseline GI symptoms we are avoiding GLP-1 RA and other PO regimens for now.    Basaglar will need to be changed because of insurance preferred agent. She however, has 10 pens left so will not send new prescription yet.    Plan  - Stop Farxiga  - Continue Basaglar 16 U daily, review CGM in 10 days, adjust insulin as needed  - Continue FreeStyle Augustin 3 CGM    A1c with next labs    F/u 3 months    Overweight with body mass index (BMI) of 26 to 26.9 in adult  Fears side effects of GLP-1 RA     Coronary artery disease involving native coronary artery of native heart with unstable angina pectoris  Difficulty tolerating SGLT-2 inhibitor  Following with Cardiology      Chris  ABIMAEL Moss MD   Endocrinology

## 2025-04-21 ENCOUNTER — TELEPHONE (OUTPATIENT)
Dept: ENDOCRINOLOGY | Facility: CLINIC | Age: 70
End: 2025-04-21
Payer: MEDICARE

## 2025-04-21 NOTE — TELEPHONE ENCOUNTER
----- Message from Jaqueline Teixeira PA-C sent at 4/21/2025  7:41 AM CDT -----  Regarding: FW: Review Augustin, we stopped Farxiga, should we increase insulin?  Augustin reviewed. Overall numbers are stable since stopping Farxiga. I did see a low this morning, but I am wondering if it was accurate. Did she prick her finger to compare?  ----- Message -----  From: Chris Moss MD  Sent: 4/21/2025  12:00 AM CDT  To: Chris Moss MD  Subject: Review Augustin, we stopped Farxiga, should we #

## 2025-04-22 ENCOUNTER — TELEPHONE (OUTPATIENT)
Dept: ENDOCRINOLOGY | Facility: CLINIC | Age: 70
End: 2025-04-22
Payer: MEDICARE

## 2025-04-23 ENCOUNTER — PATIENT MESSAGE (OUTPATIENT)
Dept: ADMINISTRATIVE | Facility: HOSPITAL | Age: 70
End: 2025-04-23
Payer: MEDICARE

## 2025-04-29 ENCOUNTER — PATIENT OUTREACH (OUTPATIENT)
Dept: ADMINISTRATIVE | Facility: HOSPITAL | Age: 70
End: 2025-04-29
Payer: MEDICARE

## 2025-04-29 NOTE — PROGRESS NOTES
Health Maintenance Topic(s) Outreach Outcomes & Actions Taken:    Eye Exam - Outreach Outcomes & Actions Taken  : Eye Camera Scheduled or Optometry/Ophthalmology Referral Placed/Appt Scheduled

## 2025-05-07 ENCOUNTER — OFFICE VISIT (OUTPATIENT)
Dept: OPTOMETRY | Facility: CLINIC | Age: 70
End: 2025-05-07
Payer: MEDICARE

## 2025-05-07 DIAGNOSIS — H02.886 MEIBOMIAN GLAND DYSFUNCTION (MGD) OF BOTH EYES, UNSPECIFIED EYELID: ICD-10-CM

## 2025-05-07 DIAGNOSIS — Z96.1 PSEUDOPHAKIA OF BOTH EYES: ICD-10-CM

## 2025-05-07 DIAGNOSIS — H53.2 TRANSIENT DIPLOPIA: ICD-10-CM

## 2025-05-07 DIAGNOSIS — Z79.4 TYPE 2 DIABETES MELLITUS WITH DIABETIC POLYNEUROPATHY, WITH LONG-TERM CURRENT USE OF INSULIN: ICD-10-CM

## 2025-05-07 DIAGNOSIS — H16.223 KERATOCONJUNCTIVITIS SICCA, NOT SPECIFIED AS SJOGREN'S, BILATERAL: Primary | ICD-10-CM

## 2025-05-07 DIAGNOSIS — H02.883 MEIBOMIAN GLAND DYSFUNCTION (MGD) OF BOTH EYES, UNSPECIFIED EYELID: ICD-10-CM

## 2025-05-07 DIAGNOSIS — E11.42 TYPE 2 DIABETES MELLITUS WITH DIABETIC POLYNEUROPATHY, WITH LONG-TERM CURRENT USE OF INSULIN: ICD-10-CM

## 2025-05-07 DIAGNOSIS — H52.7 REFRACTIVE ERROR: ICD-10-CM

## 2025-05-07 DIAGNOSIS — E11.9 TYPE 2 DIABETES MELLITUS WITHOUT RETINOPATHY: ICD-10-CM

## 2025-05-07 LAB
LEFT EYE DM RETINOPATHY: NEGATIVE
RIGHT EYE DM RETINOPATHY: NEGATIVE

## 2025-05-07 PROCEDURE — 99213 OFFICE O/P EST LOW 20 MIN: CPT | Mod: PBBFAC,PN | Performed by: OPTOMETRIST

## 2025-05-07 PROCEDURE — 99999 PR PBB SHADOW E&M-EST. PATIENT-LVL III: CPT | Mod: PBBFAC,,, | Performed by: OPTOMETRIST

## 2025-05-07 RX ORDER — PERFLUOROHEXYLOCTANE 1 MG/MG
1 SOLUTION OPHTHALMIC 4 TIMES DAILY
Qty: 3 ML | Refills: 11 | Status: SHIPPED | OUTPATIENT
Start: 2025-05-07 | End: 2026-05-07

## 2025-05-07 NOTE — PROGRESS NOTES
HPI    CC: 69 yo F presents today for diabetic eye exam. Pt reports trouble with   adjusting from reading something close up, to then looking at something at   a distance. Pt also occasional reports side by side double vision, mainly   when driving.    KAYLA: 2+ years    (+) Changes in vision   (-) Pain  (+) Irritation, dry eyes  (-) Itching   (-) Flashes  (+) Floaters, longstanding  (+) Glasses wearer, for reading only  (-) CL wearer  (+) Uses eye gtts, Restasis BID OU    Does patient want a refraction today? If needed    (-) Eye injury  (+) Eye surgery, PC IOL OU   (-)POHx  (-)FOHx    (+)DM  Hemoglobin A1C       Date                     Value               Ref Range             Status                10/08/2024               7.5 (H)             4.0 - 5.6 %           Final                  05/10/2024               10.4 (H)            4.0 - 5.6 %           Final                  03/17/2023               9.2 (H)             4.0 - 5.6 %           Final                   Last edited by Iona Palacio, OD on 5/7/2025 10:00 AM.            Assessment /Plan     For exam results, see Encounter Report.    Keratoconjunctivitis sicca, not specified as Sjogren's, bilateral  -     perfluorohexyloctane, PF, (MIEBO, PF,) 100 % Drop; Place 1 drop into both eyes 4 (four) times daily.  Dispense: 3 mL; Refill: 11    Meibomian gland dysfunction (MGD) of both eyes, unspecified eyelid  -     perfluorohexyloctane, PF, (MIEBO, PF,) 100 % Drop; Place 1 drop into both eyes 4 (four) times daily.  Dispense: 3 mL; Refill: 11    Type 2 diabetes mellitus with diabetic polyneuropathy, with long-term current use of insulin  -     Ambulatory referral/consult to Optometry    Type 2 diabetes mellitus without retinopathy    Pseudophakia of both eyes    Transient diplopia    Refractive error      1-2. Educated pt on findings. Significant dry eye OU. Continue restasis 1 gtt OU BID. Add miebo 1 gtt OU BID-QID. Additional ATs prn. Monitor 1-2 months.      3-4. No retinopathy noted, OU. Continue proper BS control and annual diabetic eye exams. Monitor yearly.      5. PCIOL clear and centered OU. Monitor yearly.      6. Pt reports occasional distance diplopia. Assume decompensated phoria. Will perform binocular testing with refraction at dry eye f/u. Monitor 1-2 months.     7. Will complete refraction at dry eye f/u. Monitor 1-2 months.     Visit today included increased complexity associated with the care of the pt's ocular surface disease and managing the longitudinal care of the patient due to the serious and/or complex managed problem.        RTC in 1-2 months for dry eye f/u + refraction and binocular testing or sooner if needed.

## 2025-05-08 ENCOUNTER — PATIENT OUTREACH (OUTPATIENT)
Dept: ADMINISTRATIVE | Facility: HOSPITAL | Age: 70
End: 2025-05-08
Payer: MEDICARE

## 2025-05-14 DIAGNOSIS — E11.9 TYPE 2 DIABETES MELLITUS WITHOUT COMPLICATION: ICD-10-CM

## 2025-05-22 ENCOUNTER — PATIENT MESSAGE (OUTPATIENT)
Dept: ENDOCRINOLOGY | Facility: CLINIC | Age: 70
End: 2025-05-22
Payer: MEDICARE

## 2025-05-22 ENCOUNTER — RESULTS FOLLOW-UP (OUTPATIENT)
Dept: FAMILY MEDICINE | Facility: CLINIC | Age: 70
End: 2025-05-22

## 2025-05-27 ENCOUNTER — TELEPHONE (OUTPATIENT)
Dept: ENDOCRINOLOGY | Facility: CLINIC | Age: 70
End: 2025-05-27
Payer: MEDICARE

## 2025-05-27 NOTE — TELEPHONE ENCOUNTER
Patient does state that what she is eating does make a difference she claims even eating with protein. She also mentioned that she will start to portion out her meals on how much she eats.     Medication Basaglar she takes 18 U every night. She feels better taking the amount of insulin and not taking Farxiga.

## 2025-05-29 ENCOUNTER — OFFICE VISIT (OUTPATIENT)
Dept: FAMILY MEDICINE | Facility: CLINIC | Age: 70
End: 2025-05-29
Payer: MEDICARE

## 2025-05-29 VITALS
WEIGHT: 163 LBS | OXYGEN SATURATION: 97 % | DIASTOLIC BLOOD PRESSURE: 76 MMHG | HEIGHT: 65 IN | HEART RATE: 67 BPM | SYSTOLIC BLOOD PRESSURE: 112 MMHG | BODY MASS INDEX: 27.16 KG/M2

## 2025-05-29 DIAGNOSIS — Z79.4 TYPE 2 DIABETES MELLITUS WITH HYPERGLYCEMIA, WITH LONG-TERM CURRENT USE OF INSULIN: ICD-10-CM

## 2025-05-29 DIAGNOSIS — E11.42 TYPE 2 DIABETES MELLITUS WITH DIABETIC POLYNEUROPATHY, WITH LONG-TERM CURRENT USE OF INSULIN: Primary | ICD-10-CM

## 2025-05-29 DIAGNOSIS — E11.65 TYPE 2 DIABETES MELLITUS WITH HYPERGLYCEMIA, WITH LONG-TERM CURRENT USE OF INSULIN: ICD-10-CM

## 2025-05-29 DIAGNOSIS — Z79.4 TYPE 2 DIABETES MELLITUS WITH DIABETIC POLYNEUROPATHY, WITH LONG-TERM CURRENT USE OF INSULIN: Primary | ICD-10-CM

## 2025-05-29 PROCEDURE — 99214 OFFICE O/P EST MOD 30 MIN: CPT | Mod: S$PBB,,, | Performed by: STUDENT IN AN ORGANIZED HEALTH CARE EDUCATION/TRAINING PROGRAM

## 2025-05-29 PROCEDURE — G2211 COMPLEX E/M VISIT ADD ON: HCPCS | Mod: S$PBB,,, | Performed by: STUDENT IN AN ORGANIZED HEALTH CARE EDUCATION/TRAINING PROGRAM

## 2025-05-29 PROCEDURE — 99999 PR PBB SHADOW E&M-EST. PATIENT-LVL III: CPT | Mod: PBBFAC,,, | Performed by: STUDENT IN AN ORGANIZED HEALTH CARE EDUCATION/TRAINING PROGRAM

## 2025-05-29 PROCEDURE — 99213 OFFICE O/P EST LOW 20 MIN: CPT | Mod: PBBFAC,PN | Performed by: STUDENT IN AN ORGANIZED HEALTH CARE EDUCATION/TRAINING PROGRAM

## 2025-05-29 RX ORDER — GLUCOSAMINE/CHONDRO SU A 500-400 MG
1 TABLET ORAL DAILY
COMMUNITY

## 2025-05-29 RX ORDER — MELATONIN 10 MG
1 CAPSULE ORAL NIGHTLY
COMMUNITY

## 2025-05-29 RX ORDER — ACETAMINOPHEN 500 MG
500 TABLET ORAL
COMMUNITY

## 2025-05-29 RX ORDER — GUAIFENESIN AND PHENYLEPHRINE HCL 400; 10 MG/1; MG/1
1 TABLET ORAL DAILY
COMMUNITY

## 2025-05-29 RX ORDER — VALERIAN ROOT 500 MG
2 CAPSULE ORAL NIGHTLY
COMMUNITY

## 2025-05-29 RX ORDER — UBIDECARENONE 100 MG
100 CAPSULE ORAL DAILY
COMMUNITY

## 2025-06-02 ENCOUNTER — PATIENT MESSAGE (OUTPATIENT)
Dept: CARDIOLOGY | Facility: CLINIC | Age: 70
End: 2025-06-02
Payer: MEDICARE

## 2025-06-02 ENCOUNTER — OFFICE VISIT (OUTPATIENT)
Dept: PSYCHIATRY | Facility: CLINIC | Age: 70
End: 2025-06-02
Payer: MEDICARE

## 2025-06-02 DIAGNOSIS — F33.1 MAJOR DEPRESSIVE DISORDER, RECURRENT EPISODE, MODERATE: Primary | ICD-10-CM

## 2025-06-02 DIAGNOSIS — F41.9 ANXIETY DISORDER, UNSPECIFIED TYPE: ICD-10-CM

## 2025-06-07 ENCOUNTER — OFFICE VISIT (OUTPATIENT)
Dept: URGENT CARE | Facility: CLINIC | Age: 70
End: 2025-06-07
Payer: MEDICARE

## 2025-06-07 VITALS
SYSTOLIC BLOOD PRESSURE: 150 MMHG | TEMPERATURE: 99 F | RESPIRATION RATE: 18 BRPM | WEIGHT: 163 LBS | BODY MASS INDEX: 27.16 KG/M2 | DIASTOLIC BLOOD PRESSURE: 88 MMHG | HEART RATE: 69 BPM | HEIGHT: 65 IN | OXYGEN SATURATION: 98 %

## 2025-06-07 DIAGNOSIS — B02.8 HERPES ZOSTER WITH OTHER COMPLICATION: Primary | ICD-10-CM

## 2025-06-07 DIAGNOSIS — M79.2 NEURALGIA: ICD-10-CM

## 2025-06-07 PROCEDURE — 99213 OFFICE O/P EST LOW 20 MIN: CPT | Mod: S$GLB,,, | Performed by: PHYSICIAN ASSISTANT

## 2025-06-07 RX ORDER — GABAPENTIN 300 MG/1
300 CAPSULE ORAL 3 TIMES DAILY
Qty: 90 CAPSULE | Refills: 0 | Status: SHIPPED | OUTPATIENT
Start: 2025-06-07 | End: 2025-07-07

## 2025-06-07 RX ORDER — VALACYCLOVIR HYDROCHLORIDE 1 G/1
1000 TABLET, FILM COATED ORAL 3 TIMES DAILY
Qty: 30 TABLET | Refills: 0 | Status: SHIPPED | OUTPATIENT
Start: 2025-06-07 | End: 2025-06-17

## 2025-06-07 RX ORDER — AMITRIPTYLINE HYDROCHLORIDE 10 MG/1
10 TABLET, FILM COATED ORAL NIGHTLY PRN
Qty: 30 TABLET | Refills: 0 | Status: SHIPPED | OUTPATIENT
Start: 2025-06-07 | End: 2025-07-07

## 2025-06-07 NOTE — PROGRESS NOTES
"Subjective:      Patient ID: Terra Mcpherson is a 70 y.o. female.    Vitals:  height is 5' 5" (1.651 m) and weight is 73.9 kg (163 lb). Her oral temperature is 98.7 °F (37.1 °C). Her blood pressure is 150/88 (abnormal) and her pulse is 69. Her respiration is 18 and oxygen saturation is 98%.     Chief Complaint: Rash    Patient start with rash last night inner thigh,achy, and muscle pain near buttocks. Patient tried hydrocortisone cream.Patient has history of HSV and had chicken pox as a child thinks it maybe shingles.  Patient complains of left upper buttock pain with pain going down the lower buttock for the last 2 days.  She states it started earlier in the week with body aches and fatigue.  She noticed a rash yesterday.  No fever chills nausea vomiting diarrhea.    Rash  This is a new problem. The current episode started yesterday. The problem has been gradually worsening since onset. The affected locations include the groin. The rash is characterized by redness and itchiness. She was exposed to nothing. Associated symptoms include fatigue and joint pain. Pertinent negatives include no anorexia, congestion, cough, diarrhea, eye pain, facial edema, fever, nail changes, rhinorrhea, shortness of breath, sore throat or vomiting. Past treatments include antibiotic cream. The treatment provided mild relief. There is no history of allergies, asthma, eczema or varicella.       Constitution: Positive for activity change and fatigue. Negative for fever.   HENT:  Negative for congestion and sore throat.    Eyes:  Negative for eye pain.   Respiratory:  Negative for cough and shortness of breath.    Gastrointestinal:  Negative for vomiting and diarrhea.   Musculoskeletal:  Positive for muscle ache.   Skin:  Positive for rash and erythema.      Objective:     Physical Exam   Constitutional: She is oriented to person, place, and time. She appears well-developed.  Non-toxic appearance. She does not appear ill. No distress. "   HENT:   Head: Normocephalic and atraumatic.   Ears:   Right Ear: External ear normal.   Left Ear: External ear normal.   Nose: No rhinorrhea or congestion.   Mouth/Throat: Oropharynx is clear and moist.   Eyes: Conjunctivae, EOM and lids are normal. Pupils are equal, round, and reactive to light. Right eye exhibits no discharge. Left eye exhibits no discharge. Extraocular movement intact   Neck: Trachea normal and phonation normal. Neck supple.   Abdominal: Normal appearance. There is no left CVA tenderness and no right CVA tenderness.   Musculoskeletal: Normal range of motion.         General: Normal range of motion.   Neurological: She is alert and oriented to person, place, and time.   Skin: Skin is warm, dry, intact, not diaphoretic and rash. erythema No bruising         Comments: Sabine our  rad tech chaperone thru entire exam no jaundice  Psychiatric: Her speech is normal and behavior is normal. Judgment and thought content normal.   Nursing note and vitals reviewed.      Assessment:     1. Herpes zoster with other complication    2. Neuralgia        Plan:       Herpes zoster with other complication  -     valACYclovir (VALTREX) 1000 MG tablet; Take 1 tablet (1,000 mg total) by mouth 3 (three) times daily. for 10 days  Dispense: 30 tablet; Refill: 0  -     amitriptyline (ELAVIL) 10 MG tablet; Take 1 tablet (10 mg total) by mouth nightly as needed for Insomnia or Pain.  Dispense: 30 tablet; Refill: 0  -     gabapentin (NEURONTIN) 300 MG capsule; Take 1 capsule (300 mg total) by mouth 3 (three) times daily.  Dispense: 90 capsule; Refill: 0    Neuralgia  -     amitriptyline (ELAVIL) 10 MG tablet; Take 1 tablet (10 mg total) by mouth nightly as needed for Insomnia or Pain.  Dispense: 30 tablet; Refill: 0  -     gabapentin (NEURONTIN) 300 MG capsule; Take 1 capsule (300 mg total) by mouth 3 (three) times daily.  Dispense: 90 capsule; Refill: 0      Follow up if symptoms worsen or fail to improve, for F/U with  PCP or ED. There are no Patient Instructions on file for this visit.

## 2025-06-13 DIAGNOSIS — E11.65 TYPE 2 DIABETES MELLITUS WITH HYPERGLYCEMIA, WITH LONG-TERM CURRENT USE OF INSULIN: ICD-10-CM

## 2025-06-13 DIAGNOSIS — Z79.4 TYPE 2 DIABETES MELLITUS WITH HYPERGLYCEMIA, WITH LONG-TERM CURRENT USE OF INSULIN: ICD-10-CM

## 2025-06-14 RX ORDER — INSULIN GLARGINE 100 [IU]/ML
34 INJECTION, SOLUTION SUBCUTANEOUS DAILY
Qty: 12 ML | Refills: 5 | Status: SHIPPED | OUTPATIENT
Start: 2025-06-14

## 2025-06-19 ENCOUNTER — OFFICE VISIT (OUTPATIENT)
Dept: PSYCHIATRY | Facility: CLINIC | Age: 70
End: 2025-06-19
Payer: MEDICARE

## 2025-06-19 DIAGNOSIS — F33.1 MAJOR DEPRESSIVE DISORDER, RECURRENT EPISODE, MODERATE: Primary | ICD-10-CM

## 2025-06-19 DIAGNOSIS — F41.9 ANXIETY DISORDER, UNSPECIFIED TYPE: ICD-10-CM

## 2025-06-19 PROCEDURE — 90837 PSYTX W PT 60 MINUTES: CPT | Mod: ,,,

## 2025-06-26 NOTE — PROGRESS NOTES
"Individual Psychotherapy (PhD/LCSW)    6/19/2025    Site:  [place]        Therapeutic Intervention: Met with patient.  Outpatient - Supportive psychotherapy 60 min - CPT Code 55130    Chief complaint/reason for encounter: depression and anxiety     Interval history and content of current session: Patient was last seen for a session approximately 3 weeks ago. Began this session with a brief check in. She was diagnosed with Shingles a couple of weeks ago and had to take time off from work, but her condition has improved recently. She is focused on saving money at this time as her ultimate goal is to return home to [place], where she feels most comfortable. She discussed the pros and cons of the decision to return to [place], stating that her older age and health issues are her primary concerns as she would be alone without family nearby. Right now, she has her son and his family near her, but she still feels isolated as they do not spend much time together. She reports feeling like she is "walking on eggshells" around her son, their relationship is somewhat distant. We returned to the topic of her upbringing/children and processed her feelings of guilt as a mother. She states that she was "not a good mother" and believes that is connected to her experiences with her own mother. She reports that she wanted to do differently with her children, but she did only what she knew based on what was modeled for her growing up. We identified "should" statements she often tells herself and utilized thought challenging strategies to discuss alternative perspectives to those statements.     Treatment plan:  Target symptoms: depression, anxiety , adjustment, interpersonal stress/conflict  Why chosen therapy is appropriate versus another modality: relevant to diagnosis, patient responds to this modality, evidence based practice  Outcome monitoring methods: self-report, observation  Therapeutic intervention type: supportive " psychotherapy    Risk parameters:  Patient reports no suicidal ideation  Patient reports no homicidal ideation  Patient reports no self-injurious behavior  Patient reports no violent behavior    Verbal deficits: None    Patient's response to intervention:  The patient's response to intervention is accepting, motivated.    Progress toward goals and other mental status changes:  The patient's progress toward goals is fair .    Diagnosis:     ICD-10-CM ICD-9-CM   1. Major depressive disorder, recurrent episode, moderate  F33.1 296.32   2. Anxiety disorder, unspecified type  F41.9 300.00       Plan:  individual psychotherapy and medication management by physician    Return to clinic: 3 weeks    Length of Service (minutes): 60

## 2025-07-08 ENCOUNTER — OFFICE VISIT (OUTPATIENT)
Dept: PSYCHIATRY | Facility: CLINIC | Age: 70
End: 2025-07-08
Payer: MEDICARE

## 2025-07-08 DIAGNOSIS — F33.1 MAJOR DEPRESSIVE DISORDER, RECURRENT EPISODE, MODERATE: Primary | ICD-10-CM

## 2025-07-08 DIAGNOSIS — F41.9 ANXIETY DISORDER, UNSPECIFIED TYPE: ICD-10-CM

## 2025-07-09 ENCOUNTER — OFFICE VISIT (OUTPATIENT)
Dept: ENDOCRINOLOGY | Facility: CLINIC | Age: 70
End: 2025-07-09
Payer: MEDICARE

## 2025-07-09 VITALS
HEIGHT: 65 IN | DIASTOLIC BLOOD PRESSURE: 64 MMHG | WEIGHT: 162.38 LBS | BODY MASS INDEX: 27.05 KG/M2 | SYSTOLIC BLOOD PRESSURE: 110 MMHG

## 2025-07-09 DIAGNOSIS — Z78.9 STATIN INTOLERANCE: ICD-10-CM

## 2025-07-09 DIAGNOSIS — I25.110 CORONARY ARTERY DISEASE INVOLVING NATIVE CORONARY ARTERY OF NATIVE HEART WITH UNSTABLE ANGINA PECTORIS: ICD-10-CM

## 2025-07-09 DIAGNOSIS — Z79.4 TYPE 2 DIABETES MELLITUS WITH DIABETIC POLYNEUROPATHY, WITH LONG-TERM CURRENT USE OF INSULIN: Primary | ICD-10-CM

## 2025-07-09 DIAGNOSIS — E11.42 TYPE 2 DIABETES MELLITUS WITH DIABETIC POLYNEUROPATHY, WITH LONG-TERM CURRENT USE OF INSULIN: Primary | ICD-10-CM

## 2025-07-09 PROCEDURE — 95251 CONT GLUC MNTR ANALYSIS I&R: CPT | Performed by: STUDENT IN AN ORGANIZED HEALTH CARE EDUCATION/TRAINING PROGRAM

## 2025-07-09 PROCEDURE — 99213 OFFICE O/P EST LOW 20 MIN: CPT | Mod: PBBFAC | Performed by: STUDENT IN AN ORGANIZED HEALTH CARE EDUCATION/TRAINING PROGRAM

## 2025-07-09 PROCEDURE — 99214 OFFICE O/P EST MOD 30 MIN: CPT | Mod: S$PBB | Performed by: STUDENT IN AN ORGANIZED HEALTH CARE EDUCATION/TRAINING PROGRAM

## 2025-07-09 PROCEDURE — 99999 PR STA SHADOW: CPT | Mod: PBBFAC,,,

## 2025-07-09 PROCEDURE — 99999 PR PBB SHADOW E&M-EST. PATIENT-LVL III: CPT | Mod: PBBFAC,,, | Performed by: STUDENT IN AN ORGANIZED HEALTH CARE EDUCATION/TRAINING PROGRAM

## 2025-07-09 PROCEDURE — G2211 COMPLEX E/M VISIT ADD ON: HCPCS | Performed by: STUDENT IN AN ORGANIZED HEALTH CARE EDUCATION/TRAINING PROGRAM

## 2025-07-09 NOTE — ASSESSMENT & PLAN NOTE
Glucose above goal with A1c 7.8%, TIR only 30% and global hyperglycemia. I discussed trying an AID which she would be open to, however recently got supplies for Basaglar and Augustin to last her a few months. Will increase basal to 20% for now.    I gave her some information on Omnipod 5. Advised to notify me if she wants to do a trial toward the end of her current supplies.    She reports significant intolerance to SGLT-2 inhibitor.. Given she has baseline GI symptoms we are avoiding GLP-1 RA and other PO regimens for now.    Will also consider applying to assistance but will determine if we will stay on Basaglar or switch to insulin via Omnipod.     Plan  - Increase Basaglar to 28 U daily  - Continue FreeStyle Augustin 3 CGM      F/u 3 months

## 2025-07-09 NOTE — PROGRESS NOTES
Subjective:      Patient ID: Terra Mcpherson is a 70 y.o. female.    Chief Complaint:  Type 2 diabetes mellitus    History of Present Illness  This is a 70 y.o. female. with a past medical history of Type 2 diabetes mellitus, CAD s/p PCI here for follow up    Interval history:  Recent shingles episode  Life stressors (work, deaths in the family, living situations)    Type 2 diabetes mellitus  Diagnosed around age 49    Current diabetes medications:  - Basaglar 24 U daily, increased past month      Past diabetes medications:  - Metformin - intolerance  - Farxiga - intolerance        Lab Results   Component Value Date    CREATININE 0.8 02/12/2025    EGFRNORACEVR >60.0 02/12/2025       Known diabetic complications: peripheral neuropathy    Weight trend:  Wt Readings from Last 8 Encounters:   07/09/25 73.7 kg (162 lb 6.4 oz)   06/07/25 73.9 kg (163 lb)   05/29/25 73.9 kg (163 lb 0.5 oz)   04/11/25 73.2 kg (161 lb 6.4 oz)   03/03/25 73 kg (161 lb)   02/20/25 73.1 kg (161 lb 0.7 oz)   02/10/25 72.7 kg (160 lb 2.6 oz)   02/05/25 72.3 kg (159 lb 8 oz)       Family history of diabetes:  Yes, mother and everyone in her side of the family  Siblings +  Children have been checked, negative for diabetes    Prior visit with diabetes education: yes    Current diet: 3 meals per day  Current exercise: On her feet all day    Blood glucose monitoring at home:               Diabetes Management Status  Statin: Not taking  ACE/ARB: Not taking    Screening or Prevention Patient's value   HgA1C Testing and Control   Lab Results   Component Value Date    HGBA1C 7.8 (H) 05/22/2025        LDL control Lab Results   Component Value Date    LDLCALC 174.2 (H) 05/22/2025      Nephropathy screening Lab Results   Component Value Date    MICALBCREAT 6.8 05/29/2025        Last eye exam: : 05/07/2025      Review of Systems  As above    Social and family history reviewed  Current medications and allergies reviewed    Objective:   /64 (BP  "Location: Right arm, Patient Position: Sitting)   Ht 5' 5" (1.651 m)   Wt 73.7 kg (162 lb 6.4 oz)   BMI 27.02 kg/m²   Physical Exam  Alert, oriented    BP Readings from Last 1 Encounters:   07/09/25 110/64      Wt Readings from Last 1 Encounters:   07/09/25 0906 73.7 kg (162 lb 6.4 oz)     Body mass index is 27.02 kg/m².    Lab Review:   Lab Results   Component Value Date    HGBA1C 7.8 (H) 05/22/2025     Lab Results   Component Value Date    CHOL 275 (H) 05/22/2025    HDL 40 05/22/2025    LDLCALC 174.2 (H) 05/22/2025    TRIG 304 (H) 05/22/2025    CHOLHDL 14.5 (L) 05/22/2025     Lab Results   Component Value Date     02/12/2025    K 4.5 02/12/2025     (H) 02/12/2025    CO2 23 02/12/2025     (H) 02/12/2025    BUN 18 02/12/2025    CREATININE 0.8 02/12/2025    CALCIUM 9.3 02/12/2025    PROT 7.2 02/12/2025    ALBUMIN 4.1 02/12/2025    BILITOT 0.6 02/12/2025    ALKPHOS 61 02/12/2025    AST 19 02/12/2025    ALT 26 02/12/2025    ANIONGAP 10 02/12/2025    TSH 2.231 02/12/2025       All pertinent labs reviewed    Assessment and Plan     Type 2 diabetes mellitus with diabetic polyneuropathy, with long-term current use of insulin  Glucose above goal with A1c 7.8%, TIR only 30% and global hyperglycemia. I discussed trying an AID which she would be open to, however recently got supplies for Basaglar and Augustin to last her a few months. Will increase basal to 20% for now.    I gave her some information on Omnipod 5. Advised to notify me if she wants to do a trial toward the end of her current supplies.    She reports significant intolerance to SGLT-2 inhibitor.. Given she has baseline GI symptoms we are avoiding GLP-1 RA and other PO regimens for now.    Will also consider applying to assistance but will determine if we will stay on Basaglar or switch to insulin via Omnipod.     Plan  - Increase Basaglar to 28 U daily  - Continue FreeStyle Augustin 3 CGM      F/u 3 months    Coronary artery disease involving " native coronary artery of native heart with unstable angina pectoris  Difficulty tolerating SGLT-2 inhibitor  Following with Cardiology    Statin intolerance  Unable tolerate  Monitored by Cardiology for CAD        Chris Moss MD   Endocrinology

## 2025-07-11 NOTE — PROGRESS NOTES
Individual Psychotherapy (PhD/LCSW)    7/8/2025    Site:  [place]        Therapeutic Intervention: Met with patient.  Outpatient - Supportive psychotherapy 60 min - CPT Code 75546    Chief complaint/reason for encounter: depression and anxiety     Interval history and content of current session: Patient was last seen for a session approximately 3 weeks ago. She reports that she is continuing to feel better physically since making changes to her medications, though she is concerned about her A1c level, which has risen recently. She has an appointment with her endocrinologist to discuss. Her great niece passed away last week, and she was unable to make it to [place] to visit her prior to due to vehicle issues. She processed feelings of sadness related to this loss. She reports feeling increasingly overwhelmed by stressors within the last week, primarily the family loss, managing chronic health conditions, and her living situation. Recognizing this, she chose to spend time alone at home this weekend, giving herself what she needs to recharge. Her daughter encouraged her to get out and socialize for the holiday weekend, but she felt that would have exacerbated how she was feeling. We discussed the importance of listening to our bodies and emotions and giving ourselves what we need when they're signaling something to us. She indicates that thinking about societal issues contributes to her depression/anxiety as well, such as community violence, drug addiction, and homelessness. We discussed this in the context of what she can/cannot control and identified a way to find balance in having compassion for others and remaining informed on current issues. Patient declined to schedule a follow up session with me as she is unable to get off of work next week, and she plans to spend the following week out-of-town with her daughter who encouraged her to visit in order to have a break from current stressors. I encouraged her  to reach out if anything changes. She reported understanding and agreed.     Treatment plan:  Target symptoms: depression, anxiety , adjustment, grief  Why chosen therapy is appropriate versus another modality: relevant to diagnosis, patient responds to this modality, evidence based practice  Outcome monitoring methods: self-report, observation  Therapeutic intervention type: supportive psychotherapy    Risk parameters:  Patient reports no suicidal ideation  Patient reports no homicidal ideation  Patient reports no self-injurious behavior  Patient reports no violent behavior    Verbal deficits: None    Patient's response to intervention:  The patient's response to intervention is accepting, motivated.    Progress toward goals and other mental status changes:  The patient's progress toward goals is fair .    Diagnosis:     ICD-10-CM ICD-9-CM   1. Major depressive disorder, recurrent episode, moderate  F33.1 296.32   2. Anxiety disorder, unspecified type  F41.9 300.00       Plan:  individual psychotherapy and medication management by physician    Return to clinic: as needed    Length of Service (minutes): 60

## 2025-07-22 ENCOUNTER — TELEPHONE (OUTPATIENT)
Dept: FAMILY MEDICINE | Facility: CLINIC | Age: 70
End: 2025-07-22
Payer: MEDICARE

## 2025-07-22 NOTE — TELEPHONE ENCOUNTER
I have spoken to the patients pharmacy in regards to the following medication RESTASIS 0.05 % ophthalmic emulsion . The pharmacy states that insurance will no longer cover this drug for the patient. Pharmacy staff would like to know if a generic brand of the medication can be sent to the pharmacy or if doctor would like for her staff to complete a prior authorization for the drug instead.

## 2025-07-22 NOTE — TELEPHONE ENCOUNTER
Copied from CRM #[id number]. Topic: Medications - Pharmacy  >> Jul 22, 2025 11:15 AM Kristina wrote:  Type:  Pharmacy Calling to Clarify an RX    Name of Caller:Mary   Pharmacy Name:[id number] - [place], [place] - [address]  [address] [place] [address]  Phone: [phone] Fax: [phone]  Prescription Name:RESTASIS 0.05 % ophthalmic emulsion  What do they need to clarify?:switch to generic ( due to insurance)    Best Call Back Number:[phone]   Additional Information:

## 2025-07-25 NOTE — TELEPHONE ENCOUNTER
I have spoken with the patients pharmacy [facility]  , and stated that the patients optometrist  states that the generic brand of RESTASIS 0.05 % ophthalmic emulsion is fine for the patient to receive in regards to insurance.

## 2025-08-22 ENCOUNTER — TELEPHONE (OUTPATIENT)
Dept: ENDOCRINOLOGY | Facility: CLINIC | Age: 70
End: 2025-08-22
Payer: MEDICARE

## 2025-08-26 ENCOUNTER — OFFICE VISIT (OUTPATIENT)
Dept: CARDIOLOGY | Facility: CLINIC | Age: 70
End: 2025-08-26
Payer: MEDICARE

## 2025-08-26 VITALS
HEIGHT: 65 IN | SYSTOLIC BLOOD PRESSURE: 115 MMHG | OXYGEN SATURATION: 95 % | BODY MASS INDEX: 27.71 KG/M2 | WEIGHT: 166.31 LBS | DIASTOLIC BLOOD PRESSURE: 72 MMHG | HEART RATE: 70 BPM

## 2025-08-26 DIAGNOSIS — I87.2 VENOUS INSUFFICIENCY: ICD-10-CM

## 2025-08-26 DIAGNOSIS — I70.0 AORTIC ATHEROSCLEROSIS: ICD-10-CM

## 2025-08-26 DIAGNOSIS — Z79.4 TYPE 2 DIABETES MELLITUS WITH DIABETIC POLYNEUROPATHY, WITH LONG-TERM CURRENT USE OF INSULIN: ICD-10-CM

## 2025-08-26 DIAGNOSIS — E11.42 TYPE 2 DIABETES MELLITUS WITH DIABETIC POLYNEUROPATHY, WITH LONG-TERM CURRENT USE OF INSULIN: ICD-10-CM

## 2025-08-26 DIAGNOSIS — E78.2 MIXED HYPERLIPIDEMIA: ICD-10-CM

## 2025-08-26 DIAGNOSIS — I25.110 CORONARY ARTERY DISEASE INVOLVING NATIVE CORONARY ARTERY OF NATIVE HEART WITH UNSTABLE ANGINA PECTORIS: Primary | ICD-10-CM

## 2025-08-26 DIAGNOSIS — Z78.9 STATIN INTOLERANCE: ICD-10-CM

## 2025-08-26 PROCEDURE — 99214 OFFICE O/P EST MOD 30 MIN: CPT | Mod: S$PBB,,, | Performed by: STUDENT IN AN ORGANIZED HEALTH CARE EDUCATION/TRAINING PROGRAM

## 2025-08-26 PROCEDURE — 99213 OFFICE O/P EST LOW 20 MIN: CPT | Mod: PBBFAC,PN | Performed by: STUDENT IN AN ORGANIZED HEALTH CARE EDUCATION/TRAINING PROGRAM

## 2025-08-26 PROCEDURE — 99999 PR PBB SHADOW E&M-EST. PATIENT-LVL III: CPT | Mod: PBBFAC,,, | Performed by: STUDENT IN AN ORGANIZED HEALTH CARE EDUCATION/TRAINING PROGRAM

## 2025-08-29 ENCOUNTER — OFFICE VISIT (OUTPATIENT)
Dept: FAMILY MEDICINE | Facility: CLINIC | Age: 70
End: 2025-08-29
Payer: MEDICARE

## 2025-08-29 VITALS
HEIGHT: 65 IN | DIASTOLIC BLOOD PRESSURE: 80 MMHG | SYSTOLIC BLOOD PRESSURE: 130 MMHG | WEIGHT: 164.25 LBS | TEMPERATURE: 98 F | HEART RATE: 69 BPM | BODY MASS INDEX: 27.36 KG/M2 | OXYGEN SATURATION: 98 %

## 2025-08-29 DIAGNOSIS — Z79.4 TYPE 2 DIABETES MELLITUS WITH DIABETIC POLYNEUROPATHY, WITH LONG-TERM CURRENT USE OF INSULIN: ICD-10-CM

## 2025-08-29 DIAGNOSIS — F41.9 ANXIETY DISORDER, UNSPECIFIED TYPE: ICD-10-CM

## 2025-08-29 DIAGNOSIS — Z00.00 ENCOUNTER FOR MEDICARE ANNUAL WELLNESS EXAM: Primary | ICD-10-CM

## 2025-08-29 DIAGNOSIS — G72.0 STATIN MYOPATHY: ICD-10-CM

## 2025-08-29 DIAGNOSIS — Z78.9 STATIN INTOLERANCE: ICD-10-CM

## 2025-08-29 DIAGNOSIS — I25.119 CORONARY ARTERY DISEASE INVOLVING NATIVE CORONARY ARTERY OF NATIVE HEART WITH ANGINA PECTORIS: ICD-10-CM

## 2025-08-29 DIAGNOSIS — Z95.5 S/P CORONARY ARTERY STENT PLACEMENT: ICD-10-CM

## 2025-08-29 DIAGNOSIS — E78.2 MIXED HYPERLIPIDEMIA: ICD-10-CM

## 2025-08-29 DIAGNOSIS — F33.1 MAJOR DEPRESSIVE DISORDER, RECURRENT EPISODE, MODERATE: ICD-10-CM

## 2025-08-29 DIAGNOSIS — R01.1 CARDIAC MURMUR: ICD-10-CM

## 2025-08-29 DIAGNOSIS — J98.4 CALCIFIED GRANULOMA OF LUNG: ICD-10-CM

## 2025-08-29 DIAGNOSIS — E11.42 TYPE 2 DIABETES MELLITUS WITH DIABETIC POLYNEUROPATHY, WITH LONG-TERM CURRENT USE OF INSULIN: ICD-10-CM

## 2025-08-29 DIAGNOSIS — T46.6X5A STATIN MYOPATHY: ICD-10-CM

## 2025-08-29 DIAGNOSIS — Z86.74 PERSONAL HISTORY OF SUDDEN CARDIAC ARREST: ICD-10-CM

## 2025-08-29 DIAGNOSIS — I87.2 VENOUS INSUFFICIENCY: ICD-10-CM

## 2025-08-29 DIAGNOSIS — I70.0 AORTIC ATHEROSCLEROSIS: ICD-10-CM

## 2025-08-29 PROBLEM — K21.9 GASTROESOPHAGEAL REFLUX DISEASE: Status: RESOLVED | Noted: 2023-10-30 | Resolved: 2025-08-29

## 2025-08-29 PROBLEM — R11.2 NAUSEA AND VOMITING: Status: RESOLVED | Noted: 2023-10-30 | Resolved: 2025-08-29

## 2025-08-29 PROBLEM — R10.13 EPIGASTRIC PAIN: Status: RESOLVED | Noted: 2023-10-02 | Resolved: 2025-08-29

## 2025-08-29 PROBLEM — F32.1 MAJOR DEPRESSIVE DISORDER, SINGLE EPISODE, MODERATE: Status: RESOLVED | Noted: 2024-11-29 | Resolved: 2025-08-29

## 2025-08-29 PROCEDURE — 99214 OFFICE O/P EST MOD 30 MIN: CPT | Mod: PBBFAC,PN

## 2025-08-29 PROCEDURE — 99999 PR PBB SHADOW E&M-EST. PATIENT-LVL IV: CPT | Mod: PBBFAC,,,

## (undated) DEVICE — GUIDEWIRE OMNI STR TIP 185CM

## (undated) DEVICE — GUIDEWIRE EMERALD .035IN 260CM

## (undated) DEVICE — KIT GLIDESHEATH SLEND 6FR 10CM

## (undated) DEVICE — CATH OPTITORQUE TIGER 5F 100CM

## (undated) DEVICE — KIT LEFT HEART MANIFOLD CUSTOM

## (undated) DEVICE — DEVICE PERCLOSE SUT CLSR 6FR

## (undated) DEVICE — HEMOSTAT VASC BAND REG 24CM

## (undated) DEVICE — STOPCOCK IV 4 WAY LG BOR ROT M

## (undated) DEVICE — GUIDE LAUNCHER 6FR JR 4.0

## (undated) DEVICE — TUBING HPCIL ROT M/F ADPT 48IN

## (undated) DEVICE — CATH EMERGE MR 8 X 2.50

## (undated) DEVICE — VALVE GUARDIAN II HEMOSTASIS

## (undated) DEVICE — SPIKE SHORT LG BORE 1-WAY 2IN

## (undated) DEVICE — SET MICRO PUNCT 4FR/MPIS-401

## (undated) DEVICE — GUIDE LAUNCHER 6FR EBU 3.0

## (undated) DEVICE — SHEATH INTRODUCER 6FR 11CM

## (undated) DEVICE — CATH EAGLE EYE ST .014X20X150

## (undated) DEVICE — Device

## (undated) DEVICE — GUIDE LAUNCHER 6FR EBU 3.5

## (undated) DEVICE — GUIDEWIRE RUNTHROUGH EF 180CM

## (undated) DEVICE — DRAPE ANGIO BRACH 38X44IN

## (undated) DEVICE — VISIPAQUE 320 200ML +PK

## (undated) DEVICE — INFLATOR ENCORE 26 BLLN INFL

## (undated) DEVICE — PAD DEFIB CADENCE ADULT R2

## (undated) DEVICE — COVER PROBE US 5.5X58L NON LTX

## (undated) DEVICE — CATH ANG PIGTAIL 4FR INFINITY

## (undated) DEVICE — GUIDEWIRE WHOLEY STD STR 260CM

## (undated) DEVICE — GUIDEWIRE SION BLUE STR 190CM

## (undated) DEVICE — GUIDE LAUNCHER 6FR JL 3.5

## (undated) DEVICE — CATH NC EMERGE MR 2.5X8X143

## (undated) DEVICE — GUIDEWIRE OMNI J TIP 185CM